# Patient Record
Sex: FEMALE | Race: WHITE | Employment: PART TIME | URBAN - METROPOLITAN AREA
[De-identification: names, ages, dates, MRNs, and addresses within clinical notes are randomized per-mention and may not be internally consistent; named-entity substitution may affect disease eponyms.]

---

## 2019-12-28 ENCOUNTER — APPOINTMENT (OUTPATIENT)
Dept: GENERAL RADIOLOGY | Age: 64
DRG: 197 | End: 2019-12-28
Payer: MEDICARE

## 2019-12-28 ENCOUNTER — HOSPITAL ENCOUNTER (INPATIENT)
Age: 64
LOS: 3 days | Discharge: HOME | DRG: 197 | End: 2020-01-01
Attending: EMERGENCY MEDICINE | Admitting: INTERNAL MEDICINE
Payer: MEDICARE

## 2019-12-28 ENCOUNTER — APPOINTMENT (OUTPATIENT)
Dept: CT IMAGING | Age: 64
DRG: 197 | End: 2019-12-28
Payer: MEDICARE

## 2019-12-28 PROBLEM — J44.9 COPD (CHRONIC OBSTRUCTIVE PULMONARY DISEASE) (HCC): Status: ACTIVE | Noted: 2019-12-28

## 2019-12-28 PROBLEM — J47.9 BRONCHIECTASIS (HCC): Status: ACTIVE | Noted: 2019-12-28

## 2019-12-28 PROBLEM — J84.89 ORGANIZING PNEUMONIA (HCC): Status: ACTIVE | Noted: 2019-12-28

## 2019-12-28 PROBLEM — A31.0 MYCOBACTERIUM AVIUM COMPLEX (HCC): Status: ACTIVE | Noted: 2019-12-28

## 2019-12-28 LAB
ANION GAP SERPL CALCULATED.3IONS-SCNC: 11 MMOL/L (ref 7–16)
APTT: 34.5 SEC (ref 24.5–35.1)
BASOPHILS ABSOLUTE: 0.06 E9/L (ref 0–0.2)
BASOPHILS RELATIVE PERCENT: 0.7 % (ref 0–2)
BUN BLDV-MCNC: 20 MG/DL (ref 8–23)
CALCIUM SERPL-MCNC: 9 MG/DL (ref 8.6–10.2)
CHLORIDE BLD-SCNC: 100 MMOL/L (ref 98–107)
CO2: 27 MMOL/L (ref 22–29)
CREAT SERPL-MCNC: 0.9 MG/DL (ref 0.5–1)
EOSINOPHILS ABSOLUTE: 0.27 E9/L (ref 0.05–0.5)
EOSINOPHILS RELATIVE PERCENT: 3.3 % (ref 0–6)
GFR AFRICAN AMERICAN: >60
GFR NON-AFRICAN AMERICAN: >60 ML/MIN/1.73
GLUCOSE BLD-MCNC: 122 MG/DL (ref 74–99)
HCT VFR BLD CALC: 38.3 % (ref 34–48)
HEMOGLOBIN: 11.9 G/DL (ref 11.5–15.5)
IMMATURE GRANULOCYTES #: 0.02 E9/L
IMMATURE GRANULOCYTES %: 0.2 % (ref 0–5)
INFLUENZA A BY PCR: NOT DETECTED
INFLUENZA B BY PCR: NOT DETECTED
INR BLD: 1.1
LACTIC ACID: 1.1 MMOL/L (ref 0.5–2.2)
LYMPHOCYTES ABSOLUTE: 0.99 E9/L (ref 1.5–4)
LYMPHOCYTES RELATIVE PERCENT: 12.2 % (ref 20–42)
MCH RBC QN AUTO: 29.6 PG (ref 26–35)
MCHC RBC AUTO-ENTMCNC: 31.1 % (ref 32–34.5)
MCV RBC AUTO: 95.3 FL (ref 80–99.9)
MONOCYTES ABSOLUTE: 0.85 E9/L (ref 0.1–0.95)
MONOCYTES RELATIVE PERCENT: 10.5 % (ref 2–12)
NEUTROPHILS ABSOLUTE: 5.94 E9/L (ref 1.8–7.3)
NEUTROPHILS RELATIVE PERCENT: 73.1 % (ref 43–80)
PDW BLD-RTO: 12.8 FL (ref 11.5–15)
PLATELET # BLD: 281 E9/L (ref 130–450)
PMV BLD AUTO: 9.2 FL (ref 7–12)
POTASSIUM REFLEX MAGNESIUM: 3.8 MMOL/L (ref 3.5–5)
PRO-BNP: 465 PG/ML (ref 0–125)
PROTHROMBIN TIME: 12.6 SEC (ref 9.3–12.4)
RBC # BLD: 4.02 E12/L (ref 3.5–5.5)
SEDIMENTATION RATE, ERYTHROCYTE: 66 MM/HR (ref 0–20)
SODIUM BLD-SCNC: 138 MMOL/L (ref 132–146)
TROPONIN: <0.01 NG/ML (ref 0–0.03)
WBC # BLD: 8.1 E9/L (ref 4.5–11.5)

## 2019-12-28 PROCEDURE — 87502 INFLUENZA DNA AMP PROBE: CPT

## 2019-12-28 PROCEDURE — 85025 COMPLETE CBC W/AUTO DIFF WBC: CPT

## 2019-12-28 PROCEDURE — 85730 THROMBOPLASTIN TIME PARTIAL: CPT

## 2019-12-28 PROCEDURE — 83605 ASSAY OF LACTIC ACID: CPT

## 2019-12-28 PROCEDURE — 6360000004 HC RX CONTRAST MEDICATION: Performed by: RADIOLOGY

## 2019-12-28 PROCEDURE — 83880 ASSAY OF NATRIURETIC PEPTIDE: CPT

## 2019-12-28 PROCEDURE — 84484 ASSAY OF TROPONIN QUANT: CPT

## 2019-12-28 PROCEDURE — 71046 X-RAY EXAM CHEST 2 VIEWS: CPT

## 2019-12-28 PROCEDURE — 87040 BLOOD CULTURE FOR BACTERIA: CPT

## 2019-12-28 PROCEDURE — 85610 PROTHROMBIN TIME: CPT

## 2019-12-28 PROCEDURE — 85651 RBC SED RATE NONAUTOMATED: CPT

## 2019-12-28 PROCEDURE — 86140 C-REACTIVE PROTEIN: CPT

## 2019-12-28 PROCEDURE — 6370000000 HC RX 637 (ALT 250 FOR IP): Performed by: EMERGENCY MEDICINE

## 2019-12-28 PROCEDURE — G0378 HOSPITAL OBSERVATION PER HR: HCPCS

## 2019-12-28 PROCEDURE — 84145 PROCALCITONIN (PCT): CPT

## 2019-12-28 PROCEDURE — 36415 COLL VENOUS BLD VENIPUNCTURE: CPT

## 2019-12-28 PROCEDURE — 6360000002 HC RX W HCPCS: Performed by: EMERGENCY MEDICINE

## 2019-12-28 PROCEDURE — 99285 EMERGENCY DEPT VISIT HI MDM: CPT

## 2019-12-28 PROCEDURE — 87186 SC STD MICRODIL/AGAR DIL: CPT

## 2019-12-28 PROCEDURE — 99222 1ST HOSP IP/OBS MODERATE 55: CPT | Performed by: INTERNAL MEDICINE

## 2019-12-28 PROCEDURE — 2580000003 HC RX 258: Performed by: EMERGENCY MEDICINE

## 2019-12-28 PROCEDURE — 80048 BASIC METABOLIC PNL TOTAL CA: CPT

## 2019-12-28 PROCEDURE — 71260 CT THORAX DX C+: CPT

## 2019-12-28 PROCEDURE — 93005 ELECTROCARDIOGRAM TRACING: CPT | Performed by: EMERGENCY MEDICINE

## 2019-12-28 PROCEDURE — 96365 THER/PROPH/DIAG IV INF INIT: CPT

## 2019-12-28 RX ORDER — ALBUTEROL SULFATE 90 UG/1
2 AEROSOL, METERED RESPIRATORY (INHALATION)
COMMUNITY
Start: 2019-05-22 | End: 2020-06-10 | Stop reason: SDUPTHER

## 2019-12-28 RX ORDER — FORMOTEROL FUMARATE 20 UG/2ML
20 SOLUTION RESPIRATORY (INHALATION) EVERY 12 HOURS
Status: DISCONTINUED | OUTPATIENT
Start: 2019-12-28 | End: 2020-01-01 | Stop reason: HOSPADM

## 2019-12-28 RX ORDER — IPRATROPIUM BROMIDE AND ALBUTEROL SULFATE 2.5; .5 MG/3ML; MG/3ML
1 SOLUTION RESPIRATORY (INHALATION)
Status: DISCONTINUED | OUTPATIENT
Start: 2019-12-29 | End: 2020-01-01 | Stop reason: HOSPADM

## 2019-12-28 RX ORDER — SODIUM CHLORIDE 0.9 % (FLUSH) 0.9 %
10 SYRINGE (ML) INJECTION PRN
Status: DISCONTINUED | OUTPATIENT
Start: 2019-12-28 | End: 2020-01-01 | Stop reason: HOSPADM

## 2019-12-28 RX ORDER — SODIUM CHLORIDE 0.9 % (FLUSH) 0.9 %
10 SYRINGE (ML) INJECTION EVERY 12 HOURS SCHEDULED
Status: DISCONTINUED | OUTPATIENT
Start: 2019-12-28 | End: 2020-01-01 | Stop reason: HOSPADM

## 2019-12-28 RX ORDER — VILAZODONE HYDROCHLORIDE 40 MG/1
40 TABLET ORAL DAILY
Status: ON HOLD | COMMUNITY
End: 2020-05-20 | Stop reason: ALTCHOICE

## 2019-12-28 RX ORDER — ONDANSETRON 2 MG/ML
4 INJECTION INTRAMUSCULAR; INTRAVENOUS EVERY 6 HOURS PRN
Status: DISCONTINUED | OUTPATIENT
Start: 2019-12-28 | End: 2020-01-01 | Stop reason: HOSPADM

## 2019-12-28 RX ORDER — METHYLPREDNISOLONE SODIUM SUCCINATE 40 MG/ML
40 INJECTION, POWDER, LYOPHILIZED, FOR SOLUTION INTRAMUSCULAR; INTRAVENOUS DAILY
Status: DISCONTINUED | OUTPATIENT
Start: 2019-12-28 | End: 2019-12-29

## 2019-12-28 RX ORDER — BUDESONIDE 0.5 MG/2ML
500 INHALANT ORAL 2 TIMES DAILY
Status: DISCONTINUED | OUTPATIENT
Start: 2019-12-28 | End: 2020-01-01 | Stop reason: HOSPADM

## 2019-12-28 RX ORDER — ACETAMINOPHEN 325 MG/1
650 TABLET ORAL EVERY 4 HOURS PRN
Status: DISCONTINUED | OUTPATIENT
Start: 2019-12-28 | End: 2020-01-01 | Stop reason: HOSPADM

## 2019-12-28 RX ORDER — IPRATROPIUM BROMIDE AND ALBUTEROL SULFATE 2.5; .5 MG/3ML; MG/3ML
3 SOLUTION RESPIRATORY (INHALATION) ONCE
Status: COMPLETED | OUTPATIENT
Start: 2019-12-28 | End: 2019-12-28

## 2019-12-28 RX ORDER — PIPERACILLIN SODIUM, TAZOBACTAM SODIUM 3; .375 G/15ML; G/15ML
3.38 INJECTION, POWDER, LYOPHILIZED, FOR SOLUTION INTRAVENOUS EVERY 6 HOURS
Status: DISCONTINUED | OUTPATIENT
Start: 2019-12-28 | End: 2019-12-28 | Stop reason: CLARIF

## 2019-12-28 RX ADMIN — CEFTRIAXONE 2 G: 2 INJECTION, POWDER, FOR SOLUTION INTRAMUSCULAR; INTRAVENOUS at 18:49

## 2019-12-28 RX ADMIN — IOPAMIDOL 80 ML: 755 INJECTION, SOLUTION INTRAVENOUS at 20:10

## 2019-12-28 RX ADMIN — IPRATROPIUM BROMIDE AND ALBUTEROL SULFATE 3 AMPULE: .5; 3 SOLUTION RESPIRATORY (INHALATION) at 17:29

## 2019-12-28 ASSESSMENT — ENCOUNTER SYMPTOMS
ABDOMINAL PAIN: 0
COUGH: 1
WHEEZING: 1
VOMITING: 0
BACK PAIN: 0
NAUSEA: 0
BLOOD IN STOOL: 0
CHEST TIGHTNESS: 0
DIARRHEA: 0
COLOR CHANGE: 0
SHORTNESS OF BREATH: 1

## 2019-12-28 ASSESSMENT — PAIN SCALES - GENERAL: PAINLEVEL_OUTOF10: 0

## 2019-12-28 NOTE — ED PROVIDER NOTES
Patient is a 82-year-old female with a history of MAC, bronchiectasis, COPD, frequent pneumonia, multiple lung nodules presenting for reported shortness of breath. She is been having URI symptoms since Georgi noting a productive cough with a clear sputum without hemoptysis, congestion, shortness of breath. Did receive her flu and pneumonia vaccines this year. Recently relocated to the area as she was previously a resident at [facility]. Follows with pulmonology there but has not established local pulmonology at this time. Called her physician, she was started on doxycycline several days previously, continues to have worsening symptoms prompting her evaluation here today. Symptoms reportedly moderate in severity, worse with exertion, improved with rest.  Denies chest pain, nausea, vomiting, diaphoresis, abdominal pain, syncope, back pain, dysuria, constipation, diarrhea. Review of Systems   Constitutional: Positive for fatigue. Negative for chills and fever. Respiratory: Positive for cough, shortness of breath and wheezing. Negative for chest tightness. Cardiovascular: Negative for chest pain, palpitations and leg swelling. Gastrointestinal: Negative for abdominal pain, blood in stool, diarrhea, nausea and vomiting. Genitourinary: Negative for dysuria, flank pain, frequency, menstrual problem, vaginal bleeding and vaginal discharge. Musculoskeletal: Negative for back pain and neck pain. Skin: Negative for color change, rash and wound. Neurological: Negative for dizziness, syncope, weakness and light-headedness. Physical Exam  Vitals signs and nursing note reviewed. Constitutional:       General: She is not in acute distress. Appearance: Normal appearance. She is well-developed. She is not diaphoretic. HENT:      Head: Normocephalic and atraumatic. Eyes:      General: No scleral icterus. Pupils: Pupils are equal, round, and reactive to light.    Neck: Musculoskeletal: Normal range of motion and neck supple. Vascular: No JVD. Cardiovascular:      Rate and Rhythm: Normal rate and regular rhythm. Heart sounds: Normal heart sounds, S1 normal and S2 normal. No murmur. Pulmonary:      Effort: No accessory muscle usage or respiratory distress. Breath sounds: Wheezing and rales (Right lung diffusely) present. No rhonchi. Comments: Minimally increased work of breathing. Abdominal:      General: Bowel sounds are normal. There is no distension. Palpations: Abdomen is soft. Tenderness: There is no tenderness. Musculoskeletal: Normal range of motion. Lymphadenopathy:      Cervical: No cervical adenopathy. Skin:     General: Skin is warm and dry. Coloration: Skin is not pale. Findings: No rash. Neurological:      Mental Status: She is alert and oriented to person, place, and time. Procedures     ED Course as of Dec 28 2124   Sat Dec 28, 2019   2042 Spoke with Dr. Nannette Matamoros, accepted for admission. [RU]      ED Course User Index  [RU] Aaliyah Laureano, DO      --------------------------------------------- PAST HISTORY ---------------------------------------------  Past Medical History:  has no past medical history on file. Past Surgical History:  has no past surgical history on file. Social History:  reports that she has never smoked. She has never used smokeless tobacco. She reports that she does not drink alcohol or use drugs. Family History: family history is not on file. The patients home medications have been reviewed. Allergies: Patient has no known allergies.     -------------------------------------------------- RESULTS -------------------------------------------------    Lab  Results for orders placed or performed during the hospital encounter of 12/28/19   Rapid influenza A/B antigens   Result Value Ref Range    Influenza A by PCR Not Detected Not Detected    Influenza B by PCR Not Detected Not Detected   CBC Auto Differential   Result Value Ref Range    WBC 8.1 4.5 - 11.5 E9/L    RBC 4.02 3.50 - 5.50 E12/L    Hemoglobin 11.9 11.5 - 15.5 g/dL    Hematocrit 38.3 34.0 - 48.0 %    MCV 95.3 80.0 - 99.9 fL    MCH 29.6 26.0 - 35.0 pg    MCHC 31.1 (L) 32.0 - 34.5 %    RDW 12.8 11.5 - 15.0 fL    Platelets 708 469 - 669 E9/L    MPV 9.2 7.0 - 12.0 fL    Neutrophils % 73.1 43.0 - 80.0 %    Immature Granulocytes % 0.2 0.0 - 5.0 %    Lymphocytes % 12.2 (L) 20.0 - 42.0 %    Monocytes % 10.5 2.0 - 12.0 %    Eosinophils % 3.3 0.0 - 6.0 %    Basophils % 0.7 0.0 - 2.0 %    Neutrophils Absolute 5.94 1.80 - 7.30 E9/L    Immature Granulocytes # 0.02 E9/L    Lymphocytes Absolute 0.99 (L) 1.50 - 4.00 E9/L    Monocytes Absolute 0.85 0.10 - 0.95 E9/L    Eosinophils Absolute 0.27 0.05 - 0.50 E9/L    Basophils Absolute 0.06 0.00 - 0.20 F2/O   Basic Metabolic Panel w/ Reflex to MG   Result Value Ref Range    Sodium 138 132 - 146 mmol/L    Potassium reflex Magnesium 3.8 3.5 - 5.0 mmol/L    Chloride 100 98 - 107 mmol/L    CO2 27 22 - 29 mmol/L    Anion Gap 11 7 - 16 mmol/L    Glucose 122 (H) 74 - 99 mg/dL    BUN 20 8 - 23 mg/dL    CREATININE 0.9 0.5 - 1.0 mg/dL    GFR Non-African American >60 >=60 mL/min/1.73    GFR African American >60     Calcium 9.0 8.6 - 10.2 mg/dL   Troponin   Result Value Ref Range    Troponin <0.01 0.00 - 0.03 ng/mL   Brain Natriuretic Peptide   Result Value Ref Range    Pro- (H) 0 - 125 pg/mL   Protime-INR   Result Value Ref Range    Protime 12.6 (H) 9.3 - 12.4 sec    INR 1.1    APTT   Result Value Ref Range    aPTT 34.5 24.5 - 35.1 sec   Lactic Acid, Plasma   Result Value Ref Range    Lactic Acid 1.1 0.5 - 2.2 mmol/L   EKG 12 Lead   Result Value Ref Range    Ventricular Rate 91 BPM    Atrial Rate 91 BPM    P-R Interval 130 ms    QRS Duration 88 ms    Q-T Interval 384 ms    QTc Calculation (Bazett) 472 ms    P Axis 61 degrees    R Axis 32 degrees    T Axis 8 degrees       Radiology  CT CHEST with potential for parapneumonic effusion, CT scan of the chest performed, notable for possible pulmonary abscesses. Afebrile here in the department without leukocytosis. Given initial dose of Rocephin followed by vancomycin. Influenza negative. Due to significant findings on CT scan of the chest in conjunction with abnormal EKG without prior for comparison, decision made to admit for further work-up and evaluation management as she will likely require echocardiogram.      --------------------------------- ADDITIONAL PROVIDER NOTES ---------------------------------  Counseling:  I have spoken with the patient and discussed todays results, in addition to providing specific details for the plan of care and counseling regarding the diagnosis and prognosis. Their questions are answered at this time and they are agreeable with the plan of admission. This patient has remained hemodynamically stable during their ED course. Diagnosis:  1. Pneumonia due to organism    2. Septic embolism (HCC)        Disposition:  Patient's disposition: Admit to telemetry  Patient's condition is stable.        Jesika Espinosa DO  Resident  12/28/19 3430

## 2019-12-29 LAB
ADENOVIRUS BY PCR: NOT DETECTED
ANION GAP SERPL CALCULATED.3IONS-SCNC: 14 MMOL/L (ref 7–16)
BASOPHILS ABSOLUTE: 0.04 E9/L (ref 0–0.2)
BASOPHILS RELATIVE PERCENT: 0.6 % (ref 0–2)
BORDETELLA PARAPERTUSSIS BY PCR: NOT DETECTED
BORDETELLA PERTUSSIS BY PCR: NOT DETECTED
BUN BLDV-MCNC: 14 MG/DL (ref 8–23)
BURR CELLS: ABNORMAL
C-REACTIVE PROTEIN: 13 MG/DL (ref 0–0.4)
CALCIUM SERPL-MCNC: 8.5 MG/DL (ref 8.6–10.2)
CHLAMYDOPHILIA PNEUMONIAE BY PCR: NOT DETECTED
CHLORIDE BLD-SCNC: 100 MMOL/L (ref 98–107)
CO2: 25 MMOL/L (ref 22–29)
CORONAVIRUS 229E BY PCR: NOT DETECTED
CORONAVIRUS HKU1 BY PCR: NOT DETECTED
CORONAVIRUS NL63 BY PCR: NOT DETECTED
CORONAVIRUS OC43 BY PCR: NOT DETECTED
CREAT SERPL-MCNC: 0.9 MG/DL (ref 0.5–1)
EKG ATRIAL RATE: 91 BPM
EKG P AXIS: 61 DEGREES
EKG P-R INTERVAL: 130 MS
EKG Q-T INTERVAL: 384 MS
EKG QRS DURATION: 88 MS
EKG QTC CALCULATION (BAZETT): 472 MS
EKG R AXIS: 32 DEGREES
EKG T AXIS: 8 DEGREES
EKG VENTRICULAR RATE: 91 BPM
EOSINOPHILS ABSOLUTE: 0.03 E9/L (ref 0.05–0.5)
EOSINOPHILS RELATIVE PERCENT: 0.4 % (ref 0–6)
GFR AFRICAN AMERICAN: >60
GFR NON-AFRICAN AMERICAN: >60 ML/MIN/1.73
GLUCOSE BLD-MCNC: 177 MG/DL (ref 74–99)
HCT VFR BLD CALC: 35.1 % (ref 34–48)
HEMOGLOBIN: 10.9 G/DL (ref 11.5–15.5)
HUMAN METAPNEUMOVIRUS BY PCR: NOT DETECTED
HUMAN RHINOVIRUS/ENTEROVIRUS BY PCR: NOT DETECTED
IMMATURE GRANULOCYTES #: 0.04 E9/L
IMMATURE GRANULOCYTES %: 0.6 % (ref 0–5)
INFLUENZA A BY PCR: NOT DETECTED
INFLUENZA B BY PCR: NOT DETECTED
LV EF: 60 %
LVEF MODALITY: NORMAL
LYMPHOCYTES ABSOLUTE: 0.49 E9/L (ref 1.5–4)
LYMPHOCYTES RELATIVE PERCENT: 6.7 % (ref 20–42)
MCH RBC QN AUTO: 29.5 PG (ref 26–35)
MCHC RBC AUTO-ENTMCNC: 31.1 % (ref 32–34.5)
MCV RBC AUTO: 94.9 FL (ref 80–99.9)
MONOCYTES ABSOLUTE: 0.14 E9/L (ref 0.1–0.95)
MONOCYTES RELATIVE PERCENT: 1.9 % (ref 2–12)
MYCOPLASMA PNEUMONIAE BY PCR: NOT DETECTED
NEUTROPHILS ABSOLUTE: 6.53 E9/L (ref 1.8–7.3)
NEUTROPHILS RELATIVE PERCENT: 89.8 % (ref 43–80)
PARAINFLUENZA VIRUS 1 BY PCR: NOT DETECTED
PARAINFLUENZA VIRUS 2 BY PCR: NOT DETECTED
PARAINFLUENZA VIRUS 3 BY PCR: NOT DETECTED
PARAINFLUENZA VIRUS 4 BY PCR: NOT DETECTED
PDW BLD-RTO: 12.9 FL (ref 11.5–15)
PLATELET # BLD: 273 E9/L (ref 130–450)
PMV BLD AUTO: 9.4 FL (ref 7–12)
POIKILOCYTES: ABNORMAL
POTASSIUM REFLEX MAGNESIUM: 4.2 MMOL/L (ref 3.5–5)
PROCALCITONIN: 0.06 NG/ML (ref 0–0.08)
RBC # BLD: 3.7 E12/L (ref 3.5–5.5)
RESPIRATORY SYNCYTIAL VIRUS BY PCR: DETECTED
SODIUM BLD-SCNC: 139 MMOL/L (ref 132–146)
WBC # BLD: 7.3 E9/L (ref 4.5–11.5)

## 2019-12-29 PROCEDURE — 96366 THER/PROPH/DIAG IV INF ADDON: CPT

## 2019-12-29 PROCEDURE — 6360000002 HC RX W HCPCS: Performed by: INTERNAL MEDICINE

## 2019-12-29 PROCEDURE — 94640 AIRWAY INHALATION TREATMENT: CPT

## 2019-12-29 PROCEDURE — 87450 HC DIRECT STREP B ANTIGEN: CPT

## 2019-12-29 PROCEDURE — 86703 HIV-1/HIV-2 1 RESULT ANTBDY: CPT

## 2019-12-29 PROCEDURE — 85025 COMPLETE CBC W/AUTO DIFF WBC: CPT

## 2019-12-29 PROCEDURE — G0378 HOSPITAL OBSERVATION PER HR: HCPCS

## 2019-12-29 PROCEDURE — 87081 CULTURE SCREEN ONLY: CPT

## 2019-12-29 PROCEDURE — 36415 COLL VENOUS BLD VENIPUNCTURE: CPT

## 2019-12-29 PROCEDURE — 96375 TX/PRO/DX INJ NEW DRUG ADDON: CPT

## 2019-12-29 PROCEDURE — 93010 ELECTROCARDIOGRAM REPORT: CPT | Performed by: INTERNAL MEDICINE

## 2019-12-29 PROCEDURE — 6360000002 HC RX W HCPCS: Performed by: STUDENT IN AN ORGANIZED HEALTH CARE EDUCATION/TRAINING PROGRAM

## 2019-12-29 PROCEDURE — 99232 SBSQ HOSP IP/OBS MODERATE 35: CPT | Performed by: INTERNAL MEDICINE

## 2019-12-29 PROCEDURE — 93306 TTE W/DOPPLER COMPLETE: CPT

## 2019-12-29 PROCEDURE — 96367 TX/PROPH/DG ADDL SEQ IV INF: CPT

## 2019-12-29 PROCEDURE — 6370000000 HC RX 637 (ALT 250 FOR IP): Performed by: STUDENT IN AN ORGANIZED HEALTH CARE EDUCATION/TRAINING PROGRAM

## 2019-12-29 PROCEDURE — 80048 BASIC METABOLIC PNL TOTAL CA: CPT

## 2019-12-29 PROCEDURE — 94664 DEMO&/EVAL PT USE INHALER: CPT

## 2019-12-29 PROCEDURE — 1200000000 HC SEMI PRIVATE

## 2019-12-29 PROCEDURE — 2580000003 HC RX 258: Performed by: STUDENT IN AN ORGANIZED HEALTH CARE EDUCATION/TRAINING PROGRAM

## 2019-12-29 PROCEDURE — 6370000000 HC RX 637 (ALT 250 FOR IP): Performed by: INTERNAL MEDICINE

## 2019-12-29 PROCEDURE — APPSS30 APP SPLIT SHARED TIME 16-30 MINUTES: Performed by: PHYSICIAN ASSISTANT

## 2019-12-29 PROCEDURE — 0100U HC RESPIRPTHGN MULT REV TRANS & AMP PRB TECH 21 TRGT: CPT

## 2019-12-29 RX ORDER — GUAIFENESIN/DEXTROMETHORPHAN 100-10MG/5
5 SYRUP ORAL EVERY 4 HOURS PRN
Status: DISCONTINUED | OUTPATIENT
Start: 2019-12-29 | End: 2020-01-01 | Stop reason: HOSPADM

## 2019-12-29 RX ORDER — VILAZODONE HYDROCHLORIDE 40 MG/1
40 TABLET ORAL DAILY
Status: DISCONTINUED | OUTPATIENT
Start: 2019-12-29 | End: 2020-01-01 | Stop reason: HOSPADM

## 2019-12-29 RX ADMIN — VANCOMYCIN HYDROCHLORIDE 1000 MG: 1 INJECTION, POWDER, LYOPHILIZED, FOR SOLUTION INTRAVENOUS at 00:41

## 2019-12-29 RX ADMIN — ENOXAPARIN SODIUM 40 MG: 40 INJECTION SUBCUTANEOUS at 08:41

## 2019-12-29 RX ADMIN — IPRATROPIUM BROMIDE AND ALBUTEROL SULFATE 1 AMPULE: .5; 3 SOLUTION RESPIRATORY (INHALATION) at 07:58

## 2019-12-29 RX ADMIN — ACETAMINOPHEN 650 MG: 325 TABLET ORAL at 02:12

## 2019-12-29 RX ADMIN — FORMOTEROL FUMARATE DIHYDRATE 20 MCG: 20 SOLUTION RESPIRATORY (INHALATION) at 20:18

## 2019-12-29 RX ADMIN — BUDESONIDE 500 MCG: 0.5 SUSPENSION RESPIRATORY (INHALATION) at 20:18

## 2019-12-29 RX ADMIN — SODIUM CHLORIDE, PRESERVATIVE FREE 10 ML: 5 INJECTION INTRAVENOUS at 00:42

## 2019-12-29 RX ADMIN — PIPERACILLIN AND TAZOBACTAM 3.38 G: 3; .375 INJECTION, POWDER, FOR SOLUTION INTRAVENOUS at 02:14

## 2019-12-29 RX ADMIN — PIPERACILLIN AND TAZOBACTAM 3.38 G: 3; .375 INJECTION, POWDER, FOR SOLUTION INTRAVENOUS at 10:11

## 2019-12-29 RX ADMIN — SODIUM CHLORIDE, PRESERVATIVE FREE 10 ML: 5 INJECTION INTRAVENOUS at 21:36

## 2019-12-29 RX ADMIN — IPRATROPIUM BROMIDE AND ALBUTEROL SULFATE 1 AMPULE: .5; 3 SOLUTION RESPIRATORY (INHALATION) at 15:29

## 2019-12-29 RX ADMIN — BUDESONIDE 500 MCG: 0.5 SUSPENSION RESPIRATORY (INHALATION) at 07:58

## 2019-12-29 RX ADMIN — SODIUM CHLORIDE, PRESERVATIVE FREE 10 ML: 5 INJECTION INTRAVENOUS at 08:42

## 2019-12-29 RX ADMIN — IPRATROPIUM BROMIDE AND ALBUTEROL SULFATE 1 AMPULE: .5; 3 SOLUTION RESPIRATORY (INHALATION) at 11:18

## 2019-12-29 RX ADMIN — IPRATROPIUM BROMIDE AND ALBUTEROL SULFATE 1 AMPULE: .5; 3 SOLUTION RESPIRATORY (INHALATION) at 20:18

## 2019-12-29 RX ADMIN — METHYLPREDNISOLONE SODIUM SUCCINATE 40 MG: 40 INJECTION, POWDER, LYOPHILIZED, FOR SOLUTION INTRAMUSCULAR; INTRAVENOUS at 00:42

## 2019-12-29 RX ADMIN — ACETAMINOPHEN 650 MG: 325 TABLET ORAL at 13:56

## 2019-12-29 RX ADMIN — VILAZODONE HYDROCHLORIDE 40 MG: 40 TABLET ORAL at 13:56

## 2019-12-29 RX ADMIN — GUAIFENESIN AND DEXTROMETHORPHAN 5 ML: 100; 10 SYRUP ORAL at 17:37

## 2019-12-29 RX ADMIN — FORMOTEROL FUMARATE DIHYDRATE 20 MCG: 20 SOLUTION RESPIRATORY (INHALATION) at 07:58

## 2019-12-29 RX ADMIN — ACETAMINOPHEN 650 MG: 325 TABLET ORAL at 08:41

## 2019-12-29 ASSESSMENT — PAIN DESCRIPTION - ONSET
ONSET: ON-GOING

## 2019-12-29 ASSESSMENT — PAIN DESCRIPTION - DESCRIPTORS
DESCRIPTORS: POUNDING
DESCRIPTORS: POUNDING
DESCRIPTORS: HEADACHE

## 2019-12-29 ASSESSMENT — PAIN DESCRIPTION - PROGRESSION
CLINICAL_PROGRESSION: NOT CHANGED
CLINICAL_PROGRESSION: GRADUALLY IMPROVING
CLINICAL_PROGRESSION: NOT CHANGED
CLINICAL_PROGRESSION: NOT CHANGED

## 2019-12-29 ASSESSMENT — PAIN DESCRIPTION - FREQUENCY
FREQUENCY: CONTINUOUS

## 2019-12-29 ASSESSMENT — PAIN DESCRIPTION - PAIN TYPE
TYPE: ACUTE PAIN

## 2019-12-29 ASSESSMENT — PAIN DESCRIPTION - LOCATION
LOCATION: HEAD

## 2019-12-29 ASSESSMENT — PAIN SCALES - GENERAL
PAINLEVEL_OUTOF10: 8
PAINLEVEL_OUTOF10: 4
PAINLEVEL_OUTOF10: 2

## 2019-12-29 ASSESSMENT — PAIN - FUNCTIONAL ASSESSMENT
PAIN_FUNCTIONAL_ASSESSMENT: ACTIVITIES ARE NOT PREVENTED

## 2019-12-29 NOTE — PROGRESS NOTES
Labs     12/28/19  1715 12/29/19  0400    139   K 3.8 4.2    100   CO2 27 25   BUN 20 14   CREATININE 0.9 0.9   GLUCOSE 122* 177*   CALCIUM 9.0 8.5*       Recent Labs     12/28/19  1715 12/29/19  0400   WBC 8.1 7.3   RBC 4.02 3.70   HGB 11.9 10.9*   HCT 38.3 35.1   MCV 95.3 94.9   MCH 29.6 29.5   MCHC 31.1* 31.1*   RDW 12.8 12.9    273   MPV 9.2 9.4        Radiology:   CT CHEST W CONTRAST   Final Result   Organizing pneumonia in the right middle lobe and lingular regions   with crowded ectatic airway suggesting bronchiectasis in the right   middle lobe. In the right lower lobe, peripheral nodular appearing densities with   some central cavitation suggest pulmonary abscesses, such as with   septic emboli. There is no evidence of pleural effusion or cardiac decompensation. ALERT:  THIS IS AN ABNORMAL REPORT-bilateral organizing pneumonia with   pulmonary abscesses on the right suggesting septic emboli. XR CHEST STANDARD (2 VW)   Final Result   : Patchy infiltrate in the regions of the right midlobe and   in the right lower lobe. Can represent developing acute pneumonia. Please correlate clinically. Assessment:    Principal Problem:    Organizing pneumonia ([address]  Active Problems:    Mycobacterium avium complex ([address]    Bronchiectasis ([address]    COPD (chronic obstructive pulmonary disease) ([address]    Pneumonia due to organism  Resolved Problems:    * No resolved hospital problems. *      Plan:  1. Pneumonia with possible abscess with possible septic emboli seen on CT chest: Patient does have a history of MAC. Was started on vancomycin and Zosyn. ID has been consulted regarding antibiotics- plan to stop antibiotics and proceed with bronchoscopy likely. Pulmonary has also been consulted, appreciate recommendations. Patient is currently on room air, continues to have intermittent shortness of breath and nonproductive cough. Procalcitonin negative.   2. History of MAC:

## 2019-12-29 NOTE — PROGRESS NOTES
(chronic obstructive pulmonary disease) (HCC)     Mycobacterium avium complex ([facility]      Past Surgical History:    History reviewed. No pertinent surgical history. Current Medications:   Scheduled Meds:   vilazodone HCl  40 mg Oral Daily    ipratropium-albuterol  1 ampule Inhalation Q4H WA    sodium chloride flush  10 mL Intravenous 2 times per day    enoxaparin  40 mg Subcutaneous Daily    budesonide  500 mcg Nebulization BID    formoterol  20 mcg Nebulization Q12H    piperacillin-tazobactam  3.375 g Intravenous Q8H     Continuous Infusions:  PRN Meds:sodium chloride flush, sodium chloride flush, magnesium hydroxide, ondansetron, acetaminophen    Allergies:  Patient has no known allergies.     Social History:   Social History     Socioeconomic History    Marital status:      Spouse name: None    Number of children: None    Years of education: None    Highest education level: None   Occupational History    None   Social Needs    Financial resource strain: None    Food insecurity:     Worry: None     Inability: None    Transportation needs:     Medical: None     Non-medical: None   Tobacco Use    Smoking status: Former Smoker     Packs/day: 1.00     Years: 20.00     Pack years: 20.00     Types: Cigarettes     Start date: 1976     Last attempt to quit: 1997     Years since quittin.0    Smokeless tobacco: Never Used   Substance and Sexual Activity    Alcohol use: No    Drug use: No    Sexual activity: None   Lifestyle    Physical activity:     Days per week: None     Minutes per session: None    Stress: None   Relationships    Social connections:     Talks on phone: None     Gets together: None     Attends Sikhism service: None     Active member of club or organization: None     Attends meetings of clubs or organizations: None     Relationship status: None    Intimate partner violence:     Fear of current or ex partner: None     Emotionally abused: None     Physically abused: None     Forced sexual activity: None   Other Topics Concern    None   Social History Narrative    None      Pets: No  Travel: No    Family History:       Problem Relation Age of Onset    No Known Problems Mother     No Known Problems Father    . Otherwise non-pertinent to the chief complaint. REVIEW OF SYSTEMS:    Constitutional: No fevers but does report some occasional night sweats  Neurologic: Negative   Psychiatric: Negative  Rheumatologic: Negative   Endocrine: Negative  Hematologic: Negative  Immunologic: Negative. Vaccinations up-to-date  ENT: Negative  Respiratory: As in the HPI  Cardiovascular: Negative  GI: Negative  : Negative  Musculoskeletal: Negative  Skin: No rashes. PHYSICAL EXAM:    Vitals:   BP (!) 103/53   Pulse 77   Temp 97.5 °F (36.4 °C) (Oral)   Resp 16   Ht 5' 2\" (1.575 m)   Wt 107 lb 6.4 oz (48.7 kg)   SpO2 93%   BMI 19.64 kg/m²   Constitutional: The patient is awake, alert, and oriented. Visitor present. She has a thin complexion. Skin: Warm and dry. No rashes were noted. HEENT: Eyes show round, and reactive pupils. No jaundice. Moist mucous membranes, no ulcerations, no thrush. Neck: Supple to movements. No lymphadenopathy. Chest: No use of accessory muscles to breathe. Symmetrical expansion. Scattered crackles, especially on the right base posteriorly. Cardiovascular: S1 and S2 are rhythmic and regular. No murmurs appreciated. Abdomen: Positive bowel sounds to auscultation. Benign to palpation. No masses felt. No hepatosplenomegaly. Extremities: No clubbing, no cyanosis, no edema.   Lines: peripheral      CBC+dif:  Recent Labs     12/28/19  1715 12/29/19  0400   WBC 8.1 7.3   HGB 11.9 10.9*   HCT 38.3 35.1   MCV 95.3 94.9    273   NEUTROABS 5.94 6.53     Lab Results   Component Value Date    CRP 13.0 (H) 12/28/2019      No results found for: CRPHS  Lab Results   Component Value Date    SEDRATE 66 (H) 12/28/2019     No results found for: ALT, AST, GGT, ALKPHOS, BILITOT  Lab Results   Component Value Date     12/29/2019    K 4.2 12/29/2019     12/29/2019    CO2 25 12/29/2019    BUN 14 12/29/2019    CREATININE 0.9 12/29/2019    GFRAA >60 12/29/2019    LABGLOM >60 12/29/2019    GLUCOSE 177 12/29/2019    CALCIUM 8.5 12/29/2019       Lab Results   Component Value Date    PROTIME 12.6 12/28/2019    INR 1.1 12/28/2019       No results found for: TSH    No results found for: NITRITE, COLORU, PHUR, LABCAST, WBCUA, RBCUA, MUCUS, TRICHOMONAS, YEAST, BACTERIA, CLARITYU, SPECGRAV, LEUKOCYTESUR, UROBILINOGEN, BILIRUBINUR, BLOODU, GLUCOSEU, AMORPHOUS    No results found for: HCO3, BE, O2SAT, PH, THGB, PCO2, PO2, TCO2  Radiology:  CT of the chest reviewed. Nothing to compare with    Microbiology:  Pending  No results for input(s): BC in the last 72 hours. No results for input(s): ORG in the last 72 hours. No results for input(s): Berdie Sandra in the last 72 hours. No results for input(s): STREPNEUMAGU in the last 72 hours. No results for input(s): LP1UAG in the last 72 hours. No results for input(s): ASO in the last 72 hours. No results for input(s): CULTRESP in the last 72 hours. Recent Labs     12/28/19  1735   PROCAL 0.06       Assessment:  · Organizing pneumonia. No evidence of consolidation to suggest bacterial pneumonia. Cannot rule out the possibility of a viral infection  · History of MAC, treated with Rifampin, Ethambutol and Clarithromycin for 2 years dating back to 2016. She may have had a recurrence  · Bronchiectasis  · Small lung cavitary lesions associated to the above    Plan:    · I think is best to stop antibiotics and go ahead and proceed with the bronchoscopy  · Urine antigens  · Respiratory panel  · Check cultures, baseline ESR, CRP  · Nares screen for MRSA  · Will follow with you    Thank you for having us see this patient in consultation.   The case was discussed with Dr. Johan Morris  2:06 PM  12/29/2019

## 2019-12-29 NOTE — H&P
97.5 °F (36.4 °C) (Axillary)   Resp 18   Ht 5' 2\" (1.575 m)   Wt 104 lb (47.2 kg)   SpO2 95%   BMI 19.02 kg/m²     General Appearance: alert and oriented to person, place and time and in no acute distress  Skin: warm and dry  Head: normocephalic and atraumatic  Eyes: pupils equal, round, and reactive to light, extraocular eye movements intact, conjunctivae normal  Neck: neck supple and non tender without mass   Pulmonary/Chest: clear to auscultation bilaterally- no wheezes, rales or rhonchi, normal air movement, no respiratory distress  Cardiovascular: normal rate, normal S1 and S2 and no carotid bruits  Abdomen: soft, non-tender, non-distended, normal bowel sounds, no masses or organomegaly  Extremities: no cyanosis, no clubbing and no edema  Neurologic: no cranial nerve deficit and speech normal    LABS:  Recent Labs     12/28/19  1715      K 3.8      CO2 27   BUN 20   CREATININE 0.9   GLUCOSE 122*   CALCIUM 9.0       Recent Labs     12/28/19  1715   WBC 8.1   RBC 4.02   HGB 11.9   HCT 38.3   MCV 95.3   MCH 29.6   MCHC 31.1*   RDW 12.8      MPV 9.2       No results for input(s): POCGLU in the last 72 hours. Radiology:   CT CHEST W CONTRAST   Final Result   Organizing pneumonia in the right middle lobe and lingular regions   with crowded ectatic airway suggesting bronchiectasis in the right   middle lobe. In the right lower lobe, peripheral nodular appearing densities with   some central cavitation suggest pulmonary abscesses, such as with   septic emboli. There is no evidence of pleural effusion or cardiac decompensation. ALERT:  THIS IS AN ABNORMAL REPORT-bilateral organizing pneumonia with   pulmonary abscesses on the right suggesting septic emboli. XR CHEST STANDARD (2 VW)   Final Result   : Patchy infiltrate in the regions of the right midlobe and   in the right lower lobe. Can represent developing acute pneumonia. Please correlate clinically.         EKG: Normal sinus rhythm    ASSESSMENT:      Principal Problem:    Organizing pneumonia ([facility]  Active Problems:    Mycobacterium avium complex ([facility]    Bronchiectasis ([facility]    COPD (chronic obstructive pulmonary disease) ([facility]  Resolved Problems:    * No resolved hospital problems. *    PLAN:  1.  Organizing pneumonia with possible abscess with possible septic emboli seen on CT chest  -Vanco and Zosyn, ID consulted input appreciated, history of MDR Pseudomonas with review of culture from 2018 susceptible to Zosyn, sputum culture pending  -Pulmonology consulted input appreciated  -Strep pneumo, Legionella antigen pending, procalcitonin pending, CRP and sed rate pending  -DuoNebs and Pulmicort, incentive spirometry    Code Status: Full Code  DVT prophylaxis: heparin 5000 TID     Electronically signed by Joseluis Elizalde MD on 12/28/2019 at 8:42 PM

## 2019-12-29 NOTE — PROGRESS NOTES
Spoke with [facility] pharmacy in [facility] on [address] to confirm patients dose of Viibryd. Dose confirmed. Dr. Jayjay Miller notified, new orders received.

## 2019-12-30 ENCOUNTER — ANESTHESIA (OUTPATIENT)
Dept: ENDOSCOPY | Age: 64
DRG: 197 | End: 2019-12-30
Payer: MEDICARE

## 2019-12-30 ENCOUNTER — ANESTHESIA EVENT (OUTPATIENT)
Dept: ENDOSCOPY | Age: 64
DRG: 197 | End: 2019-12-30
Payer: MEDICARE

## 2019-12-30 VITALS — SYSTOLIC BLOOD PRESSURE: 159 MMHG | OXYGEN SATURATION: 95 % | DIASTOLIC BLOOD PRESSURE: 70 MMHG

## 2019-12-30 LAB
ANION GAP SERPL CALCULATED.3IONS-SCNC: 9 MMOL/L (ref 7–16)
BASOPHILS ABSOLUTE: 0.06 E9/L (ref 0–0.2)
BASOPHILS RELATIVE PERCENT: 0.8 % (ref 0–2)
BUN BLDV-MCNC: 17 MG/DL (ref 8–23)
C-REACTIVE PROTEIN: 6.1 MG/DL (ref 0–0.4)
CALCIUM SERPL-MCNC: 8.6 MG/DL (ref 8.6–10.2)
CHLORIDE BLD-SCNC: 103 MMOL/L (ref 98–107)
CO2: 26 MMOL/L (ref 22–29)
CREAT SERPL-MCNC: 1.1 MG/DL (ref 0.5–1)
EOSINOPHILS ABSOLUTE: 0.18 E9/L (ref 0.05–0.5)
EOSINOPHILS RELATIVE PERCENT: 2.5 % (ref 0–6)
GFR AFRICAN AMERICAN: >60
GFR NON-AFRICAN AMERICAN: 50 ML/MIN/1.73
GLUCOSE BLD-MCNC: 96 MG/DL (ref 74–99)
HCT VFR BLD CALC: 34.3 % (ref 34–48)
HEMOGLOBIN: 10.6 G/DL (ref 11.5–15.5)
HIV-1 AND HIV-2 ANTIBODIES: NORMAL
IMMATURE GRANULOCYTES #: 0.01 E9/L
IMMATURE GRANULOCYTES %: 0.1 % (ref 0–5)
L. PNEUMOPHILA SEROGP 1 UR AG: NORMAL
LYMPHOCYTES ABSOLUTE: 1.38 E9/L (ref 1.5–4)
LYMPHOCYTES RELATIVE PERCENT: 19 % (ref 20–42)
MCH RBC QN AUTO: 29.3 PG (ref 26–35)
MCHC RBC AUTO-ENTMCNC: 30.9 % (ref 32–34.5)
MCV RBC AUTO: 94.8 FL (ref 80–99.9)
MONOCYTES ABSOLUTE: 0.64 E9/L (ref 0.1–0.95)
MONOCYTES RELATIVE PERCENT: 8.8 % (ref 2–12)
NEUTROPHILS ABSOLUTE: 5 E9/L (ref 1.8–7.3)
NEUTROPHILS RELATIVE PERCENT: 68.8 % (ref 43–80)
PDW BLD-RTO: 13.1 FL (ref 11.5–15)
PLATELET # BLD: 304 E9/L (ref 130–450)
PMV BLD AUTO: 9.5 FL (ref 7–12)
POTASSIUM REFLEX MAGNESIUM: 4.2 MMOL/L (ref 3.5–5)
RBC # BLD: 3.62 E12/L (ref 3.5–5.5)
SEDIMENTATION RATE, ERYTHROCYTE: 59 MM/HR (ref 0–20)
SODIUM BLD-SCNC: 138 MMOL/L (ref 132–146)
STREP PNEUMONIAE ANTIGEN, URINE: NORMAL
WBC # BLD: 7.3 E9/L (ref 4.5–11.5)

## 2019-12-30 PROCEDURE — 2580000003 HC RX 258: Performed by: INTERNAL MEDICINE

## 2019-12-30 PROCEDURE — 6370000000 HC RX 637 (ALT 250 FOR IP): Performed by: INTERNAL MEDICINE

## 2019-12-30 PROCEDURE — 82784 ASSAY IGA/IGD/IGG/IGM EACH: CPT

## 2019-12-30 PROCEDURE — 0B9D8ZX DRAINAGE OF RIGHT MIDDLE LUNG LOBE, VIA NATURAL OR ARTIFICIAL OPENING ENDOSCOPIC, DIAGNOSTIC: ICD-10-PCS | Performed by: INTERNAL MEDICINE

## 2019-12-30 PROCEDURE — 86140 C-REACTIVE PROTEIN: CPT

## 2019-12-30 PROCEDURE — 87116 MYCOBACTERIA CULTURE: CPT

## 2019-12-30 PROCEDURE — 87281 PNEUMOCYSTIS CARINII AG IF: CPT

## 2019-12-30 PROCEDURE — 87102 FUNGUS ISOLATION CULTURE: CPT

## 2019-12-30 PROCEDURE — 99231 SBSQ HOSP IP/OBS SF/LOW 25: CPT | Performed by: INTERNAL MEDICINE

## 2019-12-30 PROCEDURE — 89051 BODY FLUID CELL COUNT: CPT

## 2019-12-30 PROCEDURE — 87205 SMEAR GRAM STAIN: CPT

## 2019-12-30 PROCEDURE — 2709999900 HC NON-CHARGEABLE SUPPLY: Performed by: INTERNAL MEDICINE

## 2019-12-30 PROCEDURE — 80048 BASIC METABOLIC PNL TOTAL CA: CPT

## 2019-12-30 PROCEDURE — 2580000003 HC RX 258: Performed by: NURSE ANESTHETIST, CERTIFIED REGISTERED

## 2019-12-30 PROCEDURE — 7100000010 HC PHASE II RECOVERY - FIRST 15 MIN: Performed by: INTERNAL MEDICINE

## 2019-12-30 PROCEDURE — 85651 RBC SED RATE NONAUTOMATED: CPT

## 2019-12-30 PROCEDURE — 0B9G8ZX DRAINAGE OF LEFT UPPER LUNG LOBE, VIA NATURAL OR ARTIFICIAL OPENING ENDOSCOPIC, DIAGNOSTIC: ICD-10-PCS | Performed by: INTERNAL MEDICINE

## 2019-12-30 PROCEDURE — 87206 SMEAR FLUORESCENT/ACID STAI: CPT

## 2019-12-30 PROCEDURE — 85025 COMPLETE CBC W/AUTO DIFF WBC: CPT

## 2019-12-30 PROCEDURE — 36415 COLL VENOUS BLD VENIPUNCTURE: CPT

## 2019-12-30 PROCEDURE — 3609010800 HC BRONCHOSCOPY ALVEOLAR LAVAGE: Performed by: INTERNAL MEDICINE

## 2019-12-30 PROCEDURE — 87070 CULTURE OTHR SPECIMN AEROBIC: CPT

## 2019-12-30 PROCEDURE — 94640 AIRWAY INHALATION TREATMENT: CPT

## 2019-12-30 PROCEDURE — 2500000003 HC RX 250 WO HCPCS: Performed by: INTERNAL MEDICINE

## 2019-12-30 PROCEDURE — 7100000011 HC PHASE II RECOVERY - ADDTL 15 MIN: Performed by: INTERNAL MEDICINE

## 2019-12-30 PROCEDURE — 88112 CYTOPATH CELL ENHANCE TECH: CPT

## 2019-12-30 PROCEDURE — 1200000000 HC SEMI PRIVATE

## 2019-12-30 PROCEDURE — 6360000002 HC RX W HCPCS: Performed by: NURSE ANESTHETIST, CERTIFIED REGISTERED

## 2019-12-30 PROCEDURE — 3700000000 HC ANESTHESIA ATTENDED CARE: Performed by: INTERNAL MEDICINE

## 2019-12-30 PROCEDURE — 87015 SPECIMEN INFECT AGNT CONCNTJ: CPT

## 2019-12-30 PROCEDURE — 3700000001 HC ADD 15 MINUTES (ANESTHESIA): Performed by: INTERNAL MEDICINE

## 2019-12-30 RX ORDER — LIDOCAINE HYDROCHLORIDE 20 MG/ML
INJECTION, SOLUTION EPIDURAL; INFILTRATION; INTRACAUDAL; PERINEURAL PRN
Status: DISCONTINUED | OUTPATIENT
Start: 2019-12-30 | End: 2019-12-30 | Stop reason: ALTCHOICE

## 2019-12-30 RX ORDER — SODIUM CHLORIDE 9 MG/ML
INJECTION, SOLUTION INTRAVENOUS CONTINUOUS PRN
Status: DISCONTINUED | OUTPATIENT
Start: 2019-12-30 | End: 2019-12-30 | Stop reason: SDUPTHER

## 2019-12-30 RX ORDER — LIDOCAINE HYDROCHLORIDE 20 MG/ML
SOLUTION OROPHARYNGEAL PRN
Status: DISCONTINUED | OUTPATIENT
Start: 2019-12-30 | End: 2019-12-30 | Stop reason: ALTCHOICE

## 2019-12-30 RX ORDER — PROPOFOL 10 MG/ML
INJECTION, EMULSION INTRAVENOUS PRN
Status: DISCONTINUED | OUTPATIENT
Start: 2019-12-30 | End: 2019-12-30 | Stop reason: SDUPTHER

## 2019-12-30 RX ORDER — SODIUM CHLORIDE 0.9 % (FLUSH) 0.9 %
10 SYRINGE (ML) INJECTION EVERY 12 HOURS SCHEDULED
Status: DISCONTINUED | OUTPATIENT
Start: 2019-12-30 | End: 2019-12-30 | Stop reason: HOSPADM

## 2019-12-30 RX ORDER — SODIUM CHLORIDE 0.9 % (FLUSH) 0.9 %
10 SYRINGE (ML) INJECTION PRN
Status: DISCONTINUED | OUTPATIENT
Start: 2019-12-30 | End: 2019-12-30 | Stop reason: HOSPADM

## 2019-12-30 RX ADMIN — VILAZODONE HYDROCHLORIDE 40 MG: 40 TABLET ORAL at 13:10

## 2019-12-30 RX ADMIN — PROPOFOL 150 MG: 10 INJECTION, EMULSION INTRAVENOUS at 09:49

## 2019-12-30 RX ADMIN — ACETAMINOPHEN 650 MG: 325 TABLET ORAL at 13:01

## 2019-12-30 RX ADMIN — SODIUM CHLORIDE: 9 INJECTION, SOLUTION INTRAVENOUS at 09:46

## 2019-12-30 RX ADMIN — SODIUM CHLORIDE, PRESERVATIVE FREE 10 ML: 5 INJECTION INTRAVENOUS at 21:44

## 2019-12-30 RX ADMIN — IPRATROPIUM BROMIDE AND ALBUTEROL SULFATE 1 AMPULE: .5; 3 SOLUTION RESPIRATORY (INHALATION) at 16:17

## 2019-12-30 ASSESSMENT — PAIN DESCRIPTION - PROGRESSION: CLINICAL_PROGRESSION: NOT CHANGED

## 2019-12-30 ASSESSMENT — PAIN SCALES - GENERAL
PAINLEVEL_OUTOF10: 9
PAINLEVEL_OUTOF10: 0
PAINLEVEL_OUTOF10: 6

## 2019-12-30 ASSESSMENT — PAIN DESCRIPTION - DESCRIPTORS: DESCRIPTORS: ACHING;DISCOMFORT

## 2019-12-30 ASSESSMENT — PAIN DESCRIPTION - FREQUENCY: FREQUENCY: CONTINUOUS

## 2019-12-30 ASSESSMENT — PAIN DESCRIPTION - ONSET: ONSET: ON-GOING

## 2019-12-30 ASSESSMENT — PAIN DESCRIPTION - PAIN TYPE: TYPE: SURGICAL PAIN

## 2019-12-30 ASSESSMENT — PAIN DESCRIPTION - LOCATION: LOCATION: THROAT;CHEST

## 2019-12-30 ASSESSMENT — PAIN - FUNCTIONAL ASSESSMENT: PAIN_FUNCTIONAL_ASSESSMENT: ACTIVITIES ARE NOT PREVENTED

## 2019-12-30 NOTE — CARE COORDINATION
Met at bedside with pt/daughter Lizzy Aguiar; introduced myself as an RN case manager and explained my role. Discussed current course of treatment/plan of care. Pt expressed understanding. states she just moved to [place] from [facility] day of admission. Pt lives alone independently ; no hx HHC/DOROTHY. Has nebulizer through [facility], but is switching to [facility]. Request RX for nebulizer at discharge.(daughter works for [employer]). Plan is for return home at discharge with nebulizers. Will follow . Lucila Marin.

## 2019-12-30 NOTE — ANESTHESIA PRE PROCEDURE
Department of Anesthesiology  Preprocedure Note       Name:  Ramone Gibson   Age:  60 y.o.  :  1955                                          MRN:  [id number]         Date:  2019      Surgeon: Grisel Weir):  Angus Byrd MD    Procedure: BRONCHOSCOPY DIAGNOSTIC OR CELL [address] ONLY (N/A )    Medications prior to admission:   Prior to Admission medications    Medication Sig Start Date End Date Taking?  Authorizing Provider   vilazodone HCl (VILAZODONE HCL) 40 MG TABS Take 40 mg by mouth daily   Yes Historical Provider, MD   albuterol sulfate  (90 Base) MCG/ACT inhaler Inhale 2 puffs into the lungs 19  Yes Historical Provider, MD       Current medications:    Current Facility-Administered Medications   Medication Dose Route Frequency Provider Last Rate Last Dose    vilazodone HCl (VIIBRYD) TABS 40 mg  40 mg Oral Daily Anthony Servin MD   40 mg at 19 1356    guaiFENesin-dextromethorphan (ROBITUSSIN DM) 100-10 MG/5ML syrup 5 mL  5 mL Oral Q4H PRN Anthony Servin MD   5 mL at 19 1737    sodium chloride flush 0.9 % injection 10 mL  10 mL Intravenous PRN Gale Joshi MD        ipratropium-albuterol (DUONEB) nebulizer solution 1 ampule  1 ampule Inhalation Q4H WA Gale Joshi MD   1 ampule at 19    sodium chloride flush 0.9 % injection 10 mL  10 mL Intravenous 2 times per day Gale Joshi MD   10 mL at 19 2136    sodium chloride flush 0.9 % injection 10 mL  10 mL Intravenous PRN Gale Joshi MD        magnesium hydroxide (MILK OF MAGNESIA) 400 MG/5ML suspension 30 mL  30 mL Oral Daily PRN Gale Joshi MD        ondansetron (ZOFRAN) injection 4 mg  4 mg Intravenous Q6H PRN Gale Joshi MD        enoxaparin (LOVENOX) injection 40 mg  40 mg Subcutaneous Daily Gale Joshi MD   40 mg at 19 0841    acetaminophen (TYLENOL) tablet 650 mg  650 mg Oral Q4H PRN Gale Joshi MD   650 mg at 19 1356    budesonide (PULMICORT) nebulizer suspension 500 mcg  500 mcg Nebulization BID Luis Fernando Michele MD   500 mcg at 19    formoterol (PERFOROMIST) nebulizer solution 20 mcg  20 mcg Nebulization Q12H Luis Fernando Michele MD   20 mcg at 19 2018       Allergies:  No Known Allergies    Problem List:    Patient Active Problem List   Diagnosis Code    Organizing pneumonia ([facility] J84.89    Mycobacterium avium complex ([facility] A31.0    Bronchiectasis ([facility] J47.9    COPD (chronic obstructive pulmonary disease) ([facility] J44.9    Pneumonia due to organism J18.9       Past Medical History:        Diagnosis Date    Bronchiectasis ([facility]     COPD (chronic obstructive pulmonary disease) ([facility]     Mycobacterium avium complex ([facility]        Past Surgical History:  History reviewed. No pertinent surgical history. Social History:    Social History     Tobacco Use    Smoking status: Former Smoker     Packs/day: 1.00     Years: 20.00     Pack years: 20.00     Types: Cigarettes     Start date: 1976     Last attempt to quit: 1997     Years since quittin.0    Smokeless tobacco: Never Used   Substance Use Topics    Alcohol use: No                                Counseling given: Not Answered      Vital Signs (Current):   Vitals:    19 0727 19 2019 19 2130 19 0742   BP: (!) 103/53  (!) 123/59 (!) 111/56   Pulse: 77  95 86   Resp: 16  16 16   Temp: 97.5 °F (36.4 °C)  97.8 °F (36.6 °C) 98.4 °F (36.9 °C)   TempSrc: Oral  Oral Oral   SpO2: 93% 96% 96% 93%   Weight:       Height:                                                  BP Readings from Last 3 Encounters:   19 (!) 111/56   17 129/75       NPO Status:                                                                                 BMI:   Wt Readings from Last 3 Encounters:   19 107 lb 6.4 oz (48.7 kg)     Body mass index is 19.64 kg/m².     CBC:   Lab Results   Component Value Date    WBC 7.3 2019    RBC 3.62 2019

## 2019-12-30 NOTE — PROGRESS NOTES
Nursing Transfer Note    Data:  Summary of patients progress: S/P bronchoscopy  Reason for transfer: inpatient    Action:  Explained reason for transfer to Patient. Report given to: Dakotah Resendiz, using RN Handoff Navigator.   Mode of transportation: cart     Response:  RN Recommendations: see orders/notes/MAR

## 2019-12-30 NOTE — PROGRESS NOTES
MPV 9.2 9.4 9.5       CBC with Differential:    Lab Results   Component Value Date    WBC 7.3 12/30/2019    RBC 3.62 12/30/2019    HGB 10.6 12/30/2019    HCT 34.3 12/30/2019     12/30/2019    MCV 94.8 12/30/2019    MCH 29.3 12/30/2019    MCHC 30.9 12/30/2019    RDW 13.1 12/30/2019    LYMPHOPCT 19.0 12/30/2019    MONOPCT 8.8 12/30/2019    BASOPCT 0.8 12/30/2019    MONOSABS 0.64 12/30/2019    LYMPHSABS 1.38 12/30/2019    EOSABS 0.18 12/30/2019    BASOSABS 0.06 12/30/2019     BMP:    Lab Results   Component Value Date     12/30/2019    K 4.2 12/30/2019     12/30/2019    CO2 26 12/30/2019    BUN 17 12/30/2019    CREATININE 1.1 12/30/2019    CALCIUM 8.6 12/30/2019    GFRAA >60 12/30/2019    LABGLOM 50 12/30/2019    GLUCOSE 96 12/30/2019        Radiology:   CT CHEST W CONTRAST   Final Result   Organizing pneumonia in the right middle lobe and lingular regions   with crowded ectatic airway suggesting bronchiectasis in the right   middle lobe. In the right lower lobe, peripheral nodular appearing densities with   some central cavitation suggest pulmonary abscesses, such as with   septic emboli. There is no evidence of pleural effusion or cardiac decompensation. ALERT:  THIS IS AN ABNORMAL REPORT-bilateral organizing pneumonia with   pulmonary abscesses on the right suggesting septic emboli. XR CHEST STANDARD (2 VW)   Final Result   : Patchy infiltrate in the regions of the right midlobe and   in the right lower lobe. Can represent developing acute pneumonia. Please correlate clinically. Assessment:    Principal Problem:    Organizing pneumonia ([address]  Active Problems:    Mycobacterium avium complex ([address]    Bronchiectasis ([address]    COPD (chronic obstructive pulmonary disease) ([address]    Pneumonia due to organism  Resolved Problems:    * No resolved hospital problems. *      Plan:  1. RSV infection with possible pneumonia. ID note reviewed and appreciate input.  Monitor off

## 2019-12-30 NOTE — PROGRESS NOTES
Received call from Endoscopy, Bronch is scheduled for 10am. Transport will  pt at 0930. Notified bedside RN, [employer]. Endo is aware pt is in isolation.

## 2019-12-31 LAB
ANION GAP SERPL CALCULATED.3IONS-SCNC: 7 MMOL/L (ref 7–16)
APPEARANCE FLUID: NORMAL
APPEARANCE FLUID: NORMAL
BASOPHILS ABSOLUTE: 0.06 E9/L (ref 0–0.2)
BASOPHILS RELATIVE PERCENT: 0.8 % (ref 0–2)
BUN BLDV-MCNC: 17 MG/DL (ref 8–23)
CALCIUM SERPL-MCNC: 8.6 MG/DL (ref 8.6–10.2)
CELL COUNT FLUID TYPE: NORMAL
CELL COUNT FLUID TYPE: NORMAL
CHLORIDE BLD-SCNC: 103 MMOL/L (ref 98–107)
CO2: 27 MMOL/L (ref 22–29)
COLOR FLUID: COLORLESS
COLOR FLUID: NORMAL
CREAT SERPL-MCNC: 1.1 MG/DL (ref 0.5–1)
EOSINOPHIL FLUID: 2 %
EOSINOPHILS ABSOLUTE: 0.34 E9/L (ref 0.05–0.5)
EOSINOPHILS RELATIVE PERCENT: 4.8 % (ref 0–6)
GFR AFRICAN AMERICAN: >60
GFR NON-AFRICAN AMERICAN: 50 ML/MIN/1.73
GLUCOSE BLD-MCNC: 97 MG/DL (ref 74–99)
GRAM STAIN ORDERABLE: NORMAL
GRAM STAIN ORDERABLE: NORMAL
HCT VFR BLD CALC: 34.5 % (ref 34–48)
HEMOGLOBIN: 10.5 G/DL (ref 11.5–15.5)
IMMATURE GRANULOCYTES #: 0.03 E9/L
IMMATURE GRANULOCYTES %: 0.4 % (ref 0–5)
LYMPHOCYTES ABSOLUTE: 1.35 E9/L (ref 1.5–4)
LYMPHOCYTES RELATIVE PERCENT: 19.1 % (ref 20–42)
LYMPHOCYTES, BODY FLUID: 15 %
LYMPHOCYTES, BODY FLUID: 6 %
MCH RBC QN AUTO: 29.2 PG (ref 26–35)
MCHC RBC AUTO-ENTMCNC: 30.4 % (ref 32–34.5)
MCV RBC AUTO: 95.8 FL (ref 80–99.9)
MONOCYTE, FLUID: 7 %
MONOCYTE, FLUID: 8 %
MONOCYTES ABSOLUTE: 0.55 E9/L (ref 0.1–0.95)
MONOCYTES RELATIVE PERCENT: 7.8 % (ref 2–12)
MRSA CULTURE ONLY: NORMAL
NEUTROPHIL, FLUID: 75 %
NEUTROPHIL, FLUID: 87 %
NEUTROPHILS ABSOLUTE: 4.73 E9/L (ref 1.8–7.3)
NEUTROPHILS RELATIVE PERCENT: 67.1 % (ref 43–80)
NUCLEATED CELLS FLUID: 1900 /UL
NUCLEATED CELLS FLUID: 450 /UL
PDW BLD-RTO: 13.1 FL (ref 11.5–15)
PLATELET # BLD: 287 E9/L (ref 130–450)
PMV BLD AUTO: 8.8 FL (ref 7–12)
POTASSIUM REFLEX MAGNESIUM: 4.3 MMOL/L (ref 3.5–5)
RBC # BLD: 3.6 E12/L (ref 3.5–5.5)
RBC FLUID: 3100 /UL
RBC FLUID: <2000 /UL
SODIUM BLD-SCNC: 137 MMOL/L (ref 132–146)
WBC # BLD: 7.1 E9/L (ref 4.5–11.5)

## 2019-12-31 PROCEDURE — 6370000000 HC RX 637 (ALT 250 FOR IP): Performed by: INTERNAL MEDICINE

## 2019-12-31 PROCEDURE — 99233 SBSQ HOSP IP/OBS HIGH 50: CPT | Performed by: INTERNAL MEDICINE

## 2019-12-31 PROCEDURE — 85025 COMPLETE CBC W/AUTO DIFF WBC: CPT

## 2019-12-31 PROCEDURE — 2580000003 HC RX 258: Performed by: INTERNAL MEDICINE

## 2019-12-31 PROCEDURE — 6360000002 HC RX W HCPCS: Performed by: INTERNAL MEDICINE

## 2019-12-31 PROCEDURE — 1200000000 HC SEMI PRIVATE

## 2019-12-31 PROCEDURE — 80048 BASIC METABOLIC PNL TOTAL CA: CPT

## 2019-12-31 PROCEDURE — 36415 COLL VENOUS BLD VENIPUNCTURE: CPT

## 2019-12-31 PROCEDURE — 99231 SBSQ HOSP IP/OBS SF/LOW 25: CPT | Performed by: INTERNAL MEDICINE

## 2019-12-31 PROCEDURE — 94640 AIRWAY INHALATION TREATMENT: CPT

## 2019-12-31 RX ORDER — IPRATROPIUM BROMIDE AND ALBUTEROL SULFATE 2.5; .5 MG/3ML; MG/3ML
3 SOLUTION RESPIRATORY (INHALATION)
Qty: 360 ML | Refills: 0 | Status: SHIPPED | OUTPATIENT
Start: 2019-12-31 | End: 2020-01-01

## 2019-12-31 RX ORDER — SODIUM CHLORIDE FOR INHALATION 3 %
4 VIAL, NEBULIZER (ML) INHALATION EVERY 12 HOURS
Status: COMPLETED | OUTPATIENT
Start: 2019-12-31 | End: 2020-01-01

## 2019-12-31 RX ORDER — GUAIFENESIN 400 MG/1
400 TABLET ORAL EVERY 8 HOURS
Status: DISCONTINUED | OUTPATIENT
Start: 2019-12-31 | End: 2020-01-01 | Stop reason: HOSPADM

## 2019-12-31 RX ADMIN — FORMOTEROL FUMARATE DIHYDRATE 20 MCG: 20 SOLUTION RESPIRATORY (INHALATION) at 07:58

## 2019-12-31 RX ADMIN — SODIUM CHLORIDE SOLN NEBU 3% 4 ML: 3 NEBU SOLN at 22:45

## 2019-12-31 RX ADMIN — GUAIFENESIN 400 MG: 400 TABLET ORAL at 22:39

## 2019-12-31 RX ADMIN — IPRATROPIUM BROMIDE AND ALBUTEROL SULFATE 1 AMPULE: .5; 3 SOLUTION RESPIRATORY (INHALATION) at 07:57

## 2019-12-31 RX ADMIN — IPRATROPIUM BROMIDE AND ALBUTEROL SULFATE 1 AMPULE: .5; 3 SOLUTION RESPIRATORY (INHALATION) at 15:42

## 2019-12-31 RX ADMIN — IPRATROPIUM BROMIDE AND ALBUTEROL SULFATE 1 AMPULE: .5; 3 SOLUTION RESPIRATORY (INHALATION) at 11:28

## 2019-12-31 RX ADMIN — FORMOTEROL FUMARATE DIHYDRATE 20 MCG: 20 SOLUTION RESPIRATORY (INHALATION) at 19:44

## 2019-12-31 RX ADMIN — BUDESONIDE 500 MCG: 0.5 SUSPENSION RESPIRATORY (INHALATION) at 19:43

## 2019-12-31 RX ADMIN — SODIUM CHLORIDE, PRESERVATIVE FREE 10 ML: 5 INJECTION INTRAVENOUS at 08:31

## 2019-12-31 RX ADMIN — IPRATROPIUM BROMIDE AND ALBUTEROL SULFATE 1 AMPULE: .5; 3 SOLUTION RESPIRATORY (INHALATION) at 19:43

## 2019-12-31 RX ADMIN — BUDESONIDE 500 MCG: 0.5 SUSPENSION RESPIRATORY (INHALATION) at 07:58

## 2019-12-31 RX ADMIN — SODIUM CHLORIDE, PRESERVATIVE FREE 10 ML: 5 INJECTION INTRAVENOUS at 22:39

## 2019-12-31 RX ADMIN — VILAZODONE HYDROCHLORIDE 40 MG: 40 TABLET ORAL at 08:31

## 2019-12-31 ASSESSMENT — PAIN SCALES - GENERAL
PAINLEVEL_OUTOF10: 0
PAINLEVEL_OUTOF10: 0

## 2019-12-31 NOTE — PROGRESS NOTES
input. Monitor off abx. Pt s/p bronch  2. Bronchiectasis pulmonary following. S/p bronch. Nebs  3. Hx of MAC pulmonary following  4.  Hyperglycemia monitor        Electronically signed by Danny Case DO on 12/31/2019 at 5:31 PM

## 2019-12-31 NOTE — PROGRESS NOTES
Dr. Rodriguez Ring in to see pt & request she not be discharged yet. Pt is still having SOB & wheezing. Dr. Diamond Walker notified. New order to cancel discharge.

## 2019-12-31 NOTE — PROGRESS NOTES
[address] [place] [address] [facility]  Progress Note    SUBJECTIVE:  Chief Complaint   Patient presents with    Shortness of Breath     Has been sick since Georgi, excessive coughing, history of COPD; patient states she is immunocompromised      Feeling better. Less cough, pale yellow sputum. C/o right ear pain. No n/v/d. No fevers    Review of systems:  As stated above in the chief complaint, otherwise negative. Medications:  Scheduled Meds:   vilazodone HCl  40 mg Oral Daily    ipratropium-albuterol  1 ampule Inhalation Q4H WA    sodium chloride flush  10 mL Intravenous 2 times per day    enoxaparin  40 mg Subcutaneous Daily    budesonide  500 mcg Nebulization BID    formoterol  20 mcg Nebulization Q12H     Continuous Infusions:  PRN Meds:guaiFENesin-dextromethorphan, sodium chloride flush, sodium chloride flush, magnesium hydroxide, ondansetron, acetaminophen    OBJECTIVE:  BP (!) 102/53   Pulse 73   Temp 96.9 °F (36.1 °C) (Axillary)   Resp 18   Ht 5' 2\" (1.575 m)   Wt 106 lb 7 oz (48.3 kg)   SpO2 93%   BMI 19.47 kg/m²   Temp  Av.5 °F (36.4 °C)  Min: 96.9 °F (36.1 °C)  Max: 98 °F (36.7 °C)  Constitutional: The patient is awake, alert, and oriented. Sitting up in bed in NAD  Skin: Warm and dry. No rashes were noted. HEENT: Round and reactive pupils. Moist mucous membranes. No ulcerations or thrush. Neck: Supple to movements. Chest: No use of accessory muscles to breathe. Symmetrical expansion. Diminished right base  Cardiovascular: S1 and S2 are rhythmic and regular. No murmurs appreciated. Abdomen: Positive bowel sounds to auscultation. nondistended  Extremities: No clubbing, no cyanosis, no edema.   Lines: peripheral    Laboratory and Tests Review:  Lab Results   Component Value Date    WBC 7.1 2019    WBC 7.3 2019    WBC 7.3 2019    HGB 10.5 (L) 2019    HCT 34.5 2019    MCV 95.8 2019     2019     Lab Results Component Value Date    NEUTROABS 4.73 12/31/2019    NEUTROABS 5.00 12/30/2019    NEUTROABS 6.53 12/29/2019     No results found for: CRPHS  No results found for: ALT, AST, GGT, ALKPHOS, BILITOT  Lab Results   Component Value Date     12/31/2019    K 4.3 12/31/2019     12/31/2019    CO2 27 12/31/2019    BUN 17 12/31/2019    CREATININE 1.1 12/31/2019    CREATININE 1.1 12/30/2019    CREATININE 0.9 12/29/2019    GFRAA >60 12/31/2019    LABGLOM 50 12/31/2019    GLUCOSE 97 12/31/2019    CALCIUM 8.6 12/31/2019     Lab Results   Component Value Date    CRP 6.1 (H) 12/30/2019    CRP 13.0 (H) 12/28/2019     Lab Results   Component Value Date    SEDRATE 59 (H) 12/30/2019    SEDRATE 66 (H) 12/28/2019     Radiology:      Microbiology:   Blood cx no growth  Resp viral panel + RSV  Urine legionella/strep pneumo neg    Assessment:  · Organizing pneumonia. No evidence of consolidation to suggest bacterial pneumonia. +RSV  · History of MAC, treated with Rifampin, Ethambutol and Clarithromycin for 2 years dating back to 2016. She may have had a recurrence  · Bronchiectasis  · Small lung cavitary lesions associated to the above    Plan:    · Droplet isolation  · F/u cx   · F/u BAL cx, AFB. · Can discharge from ID perspective. F/u in 4 wks as outpt to check bronch results for AFB  · Will follow with you    Yolanda Mccracken  12:26 PM  12/31/2019     Patient seen and examined. I had a face to face encounter with the patient. Agree with exam.  Assessment and plan as outlined above and directed by me. Addition and corrections were done as deemed appropriate. My exam and plan include: The patient is feeling about the same. She is ready to go home and began to convalesce from her hours to be there. I gave her a business card and asked her to call the office and make a follow-up appointment in about 4 weeks. We should have some results of AFB cultures from her BAL by then.   She was also instructed to bring a CD of her

## 2020-01-01 VITALS
BODY MASS INDEX: 19.54 KG/M2 | HEART RATE: 95 BPM | WEIGHT: 106.2 LBS | OXYGEN SATURATION: 96 % | TEMPERATURE: 97.8 F | RESPIRATION RATE: 18 BRPM | HEIGHT: 62 IN | DIASTOLIC BLOOD PRESSURE: 52 MMHG | SYSTOLIC BLOOD PRESSURE: 104 MMHG

## 2020-01-01 LAB
ANION GAP SERPL CALCULATED.3IONS-SCNC: 12 MMOL/L (ref 7–16)
BASOPHILS ABSOLUTE: 0.07 E9/L (ref 0–0.2)
BASOPHILS RELATIVE PERCENT: 1.1 % (ref 0–2)
BUN BLDV-MCNC: 20 MG/DL (ref 8–23)
CALCIUM SERPL-MCNC: 8.8 MG/DL (ref 8.6–10.2)
CHLORIDE BLD-SCNC: 102 MMOL/L (ref 98–107)
CO2: 26 MMOL/L (ref 22–29)
CREAT SERPL-MCNC: 1.1 MG/DL (ref 0.5–1)
CULTURE, RESPIRATORY: NORMAL
EOSINOPHILS ABSOLUTE: 0.36 E9/L (ref 0.05–0.5)
EOSINOPHILS RELATIVE PERCENT: 5.5 % (ref 0–6)
GFR AFRICAN AMERICAN: >60
GFR NON-AFRICAN AMERICAN: 50 ML/MIN/1.73
GLUCOSE BLD-MCNC: 98 MG/DL (ref 74–99)
HCT VFR BLD CALC: 36.2 % (ref 34–48)
HEMOGLOBIN: 11.1 G/DL (ref 11.5–15.5)
IGA: 138 MG/DL (ref 70–400)
IGG: 745 MG/DL (ref 700–1600)
IGM: 386 MG/DL (ref 40–230)
IMMATURE GRANULOCYTES #: 0.03 E9/L
IMMATURE GRANULOCYTES %: 0.5 % (ref 0–5)
LYMPHOCYTES ABSOLUTE: 1.36 E9/L (ref 1.5–4)
LYMPHOCYTES RELATIVE PERCENT: 20.7 % (ref 20–42)
MCH RBC QN AUTO: 29.4 PG (ref 26–35)
MCHC RBC AUTO-ENTMCNC: 30.7 % (ref 32–34.5)
MCV RBC AUTO: 96 FL (ref 80–99.9)
MONOCYTES ABSOLUTE: 0.58 E9/L (ref 0.1–0.95)
MONOCYTES RELATIVE PERCENT: 8.8 % (ref 2–12)
NEUTROPHILS ABSOLUTE: 4.18 E9/L (ref 1.8–7.3)
NEUTROPHILS RELATIVE PERCENT: 63.4 % (ref 43–80)
PDW BLD-RTO: 13.2 FL (ref 11.5–15)
PLATELET # BLD: 314 E9/L (ref 130–450)
PMV BLD AUTO: 8.9 FL (ref 7–12)
POTASSIUM REFLEX MAGNESIUM: 4.4 MMOL/L (ref 3.5–5)
RBC # BLD: 3.77 E12/L (ref 3.5–5.5)
SMEAR, RESPIRATORY: NORMAL
SODIUM BLD-SCNC: 140 MMOL/L (ref 132–146)
WBC # BLD: 6.6 E9/L (ref 4.5–11.5)

## 2020-01-01 PROCEDURE — 85025 COMPLETE CBC W/AUTO DIFF WBC: CPT

## 2020-01-01 PROCEDURE — 94640 AIRWAY INHALATION TREATMENT: CPT

## 2020-01-01 PROCEDURE — 2580000003 HC RX 258: Performed by: INTERNAL MEDICINE

## 2020-01-01 PROCEDURE — 99239 HOSP IP/OBS DSCHRG MGMT >30: CPT | Performed by: INTERNAL MEDICINE

## 2020-01-01 PROCEDURE — 80048 BASIC METABOLIC PNL TOTAL CA: CPT

## 2020-01-01 PROCEDURE — 6370000000 HC RX 637 (ALT 250 FOR IP): Performed by: INTERNAL MEDICINE

## 2020-01-01 PROCEDURE — 6360000002 HC RX W HCPCS: Performed by: INTERNAL MEDICINE

## 2020-01-01 PROCEDURE — 36415 COLL VENOUS BLD VENIPUNCTURE: CPT

## 2020-01-01 RX ORDER — IPRATROPIUM BROMIDE AND ALBUTEROL SULFATE 2.5; .5 MG/3ML; MG/3ML
3 SOLUTION RESPIRATORY (INHALATION)
Qty: 360 ML | Refills: 0 | Status: SHIPPED | OUTPATIENT
Start: 2020-01-01

## 2020-01-01 RX ORDER — GUAIFENESIN/DEXTROMETHORPHAN 100-10MG/5
5 SYRUP ORAL EVERY 4 HOURS PRN
Qty: 120 ML | Refills: 0 | Status: SHIPPED | OUTPATIENT
Start: 2020-01-01 | End: 2020-01-11

## 2020-01-01 RX ORDER — GUAIFENESIN/DEXTROMETHORPHAN 100-10MG/5
5 SYRUP ORAL EVERY 4 HOURS PRN
Qty: 120 ML | Refills: 0 | Status: SHIPPED | OUTPATIENT
Start: 2020-01-01 | End: 2020-01-01

## 2020-01-01 RX ADMIN — BUDESONIDE 500 MCG: 0.5 SUSPENSION RESPIRATORY (INHALATION) at 09:09

## 2020-01-01 RX ADMIN — SODIUM CHLORIDE, PRESERVATIVE FREE 10 ML: 5 INJECTION INTRAVENOUS at 08:57

## 2020-01-01 RX ADMIN — FORMOTEROL FUMARATE DIHYDRATE 20 MCG: 20 SOLUTION RESPIRATORY (INHALATION) at 09:09

## 2020-01-01 RX ADMIN — VILAZODONE HYDROCHLORIDE 40 MG: 40 TABLET ORAL at 08:57

## 2020-01-01 RX ADMIN — IPRATROPIUM BROMIDE AND ALBUTEROL SULFATE 1 AMPULE: .5; 3 SOLUTION RESPIRATORY (INHALATION) at 09:09

## 2020-01-01 RX ADMIN — SODIUM CHLORIDE SOLN NEBU 3% 4 ML: 3 NEBU SOLN at 09:09

## 2020-01-01 ASSESSMENT — PAIN SCALES - GENERAL: PAINLEVEL_OUTOF10: 0

## 2020-01-01 NOTE — DISCHARGE SUMMARY
[address] [place] [address]       Hospitalist Physician Discharge Summary       Pre-service for Primary Care Physician  On day of discharge please call [phone] to establish with a primary care physician. Please inform them you need a Follow Up Appointment as well due to recent hospitalization. Thanks so much !!!        David Sharif MD  [address] [place] [address]    Schedule an appointment as soon as possible for a visit in 4 weeks      David Sharif MD  [address]  [phone]    Call in 1 week        Activity level: as diogo    Diet: DIET GENERAL;    Dispo:home     Condition at discharge: fair          Patient ID:  Princess Vance  [id number]  60 y.o.  1955    Admit date: 12/28/2019    Discharge date and time:  1/1/2020  9:00 AM    Admission Diagnoses: Principal Problem:    Organizing pneumonia [facility])  Active Problems:    Mycobacterium avium complex ([address]    Bronchiectasis ([address]    COPD (chronic obstructive pulmonary disease) ([address]    Pneumonia due to organism    RSV infection    Hyperglycemia  Resolved Problems:    * No resolved hospital problems. *      Discharge Diagnoses: Principal Problem:    Organizing pneumonia ([address]  Active Problems:    Mycobacterium avium complex ([address]    Bronchiectasis ([address]    COPD (chronic obstructive pulmonary disease) ([address]    Pneumonia due to organism    RSV infection    Hyperglycemia  Resolved Problems:    * No resolved hospital problems. *    rsv infection  brochiectassi  Hx of MAC  hyperglycemia      Consults:  IP CONSULT TO INFECTIOUS DISEASES  IP CONSULT TO PULMONOLOGY    Procedures: echo  Summary   Left ventricular size is grossly normal.   Mild left ventricular concentric hypertrophy noted. No regional wall motion abnormalities seen.    Ejection fraction is visually estimated at 60%    Bronchoscopy Post-op Diagnosis: pneumonia r/o [facility]    Hospital Course: Patient was admitted with

## 2020-01-02 LAB
BLOOD CULTURE, ROUTINE: NORMAL
CULTURE, BLOOD 2: NORMAL
CULTURE, RESPIRATORY: ABNORMAL
CULTURE, RESPIRATORY: ABNORMAL
ORGANISM: ABNORMAL
PNEUMOCYSTIS DFA: NORMAL
PNEUMOCYSTIS DFA: NORMAL
SMEAR, RESPIRATORY: ABNORMAL

## 2020-02-03 LAB
FUNGUS (MYCOLOGY) CULTURE: NORMAL
FUNGUS STAIN: NORMAL

## 2020-02-17 ENCOUNTER — OFFICE VISIT (OUTPATIENT)
Dept: PULMONOLOGY | Age: 65
End: 2020-02-17
Payer: MEDICARE

## 2020-02-17 VITALS
HEART RATE: 69 BPM | BODY MASS INDEX: 19.94 KG/M2 | SYSTOLIC BLOOD PRESSURE: 101 MMHG | RESPIRATION RATE: 14 BRPM | WEIGHT: 109 LBS | DIASTOLIC BLOOD PRESSURE: 53 MMHG | OXYGEN SATURATION: 96 %

## 2020-02-17 PROCEDURE — G8484 FLU IMMUNIZE NO ADMIN: HCPCS | Performed by: INTERNAL MEDICINE

## 2020-02-17 PROCEDURE — 99214 OFFICE O/P EST MOD 30 MIN: CPT | Performed by: INTERNAL MEDICINE

## 2020-02-17 PROCEDURE — G8420 CALC BMI NORM PARAMETERS: HCPCS | Performed by: INTERNAL MEDICINE

## 2020-02-17 PROCEDURE — 3017F COLORECTAL CA SCREEN DOC REV: CPT | Performed by: INTERNAL MEDICINE

## 2020-02-17 PROCEDURE — 99213 OFFICE O/P EST LOW 20 MIN: CPT | Performed by: INTERNAL MEDICINE

## 2020-02-17 PROCEDURE — 1036F TOBACCO NON-USER: CPT | Performed by: INTERNAL MEDICINE

## 2020-02-17 PROCEDURE — G8427 DOCREV CUR MEDS BY ELIG CLIN: HCPCS | Performed by: INTERNAL MEDICINE

## 2020-02-17 RX ORDER — BENZONATATE 100 MG/1
100 CAPSULE ORAL
Status: ON HOLD | COMMUNITY
Start: 2019-05-22 | End: 2020-05-20 | Stop reason: ALTCHOICE

## 2020-02-17 RX ORDER — NAPROXEN SODIUM 220 MG
440 TABLET ORAL PRN
Status: ON HOLD | COMMUNITY
End: 2020-05-30 | Stop reason: HOSPADM

## 2020-02-17 NOTE — PROGRESS NOTES
Hospital follow up in office today; coughing up yellow/dark mucous. Dr. Asia Burton discussed by phone pt's case with Dr. Nicol Bynum. Plan for sputum cultures x 3. Pt given specimen containers and instructions and lab reqs put in bags for pt to take when able to cough up mucous specimen. Plan for follow up in office in 3-4 mos; appt given.

## 2020-02-17 NOTE — PROGRESS NOTES
Pulmonary [facility]                             Pulmonary Consult/Progress Note :      CHIEF COMPLAINT:  Sob and cough    HISTORY OF PRESENT ILLNESS:      Known H/O MAC who  Presents with increased cough and sob for past 2-3 days. Was diagnosed with MAC about 5 yrs ago and treated at [facility] for about 18 months,   Smoke for about 15-20 years(20 PPY )  Quit smoking 20 years ago   Underwent Bronch   CT multiple cavitary lesions and bronchiectasis   Now on RA   Still with wheezing and cough       Past Medical History:   Diagnosis Date    Bronchiectasis ([address]     [facility]     Mycobacterium avium complex ([facility]      Past Surgical History:   Procedure Laterality Date    BRONCHOSCOPY N/A 12/30/2019    BRONCHOSCOPY ALVEOLAR LAVAGE performed by Batsheva Berry MD at [facility] ENDOSCOPY     Family history :reviewed Non contributory    Allergy None    Meds Reviewed MAR     Today Visit    Patient was diagnosed with MAC 2013 and she had abx for 18 month and she felt and then slow continue to have and she still has Sputum yellow and brown   She has SOB as well    PHYSICAL EXAM:      Vitals:    BP (!) 101/53   Pulse 69   Resp 14   Wt 109 lb (49.4 kg)   SpO2 96%   BMI 19.94 kg/m²     EYES:  Lids and lashes normal, pupils equal, round and reactive to light, extra ocular muscles intact, sclera clear, conjunctiva normal  ENT:  Normocephalic, without obvious abnormality, atraumatic, sinuses nontender on palpation, external ears without lesions, oral pharynx with moist mucus membranes, tonsils without erythema or exudates, gums normal and good dentition.   NECK:  Supple, symmetrical, trachea midline, no adenopathy, thyroid symmetric, not enlarged and no tenderness, skin normal  LUNGS:  Bilateral ronchi with cough  CARDIOVASCULAR:  Normal apical impulse, regular rate and rhythm, normal S1 and S2, no S3 or S4, and no murmur noted  ABDOMEN:  No scars, normal bowel sounds, soft, non-distended, non-tender, no masses palpated, no hepatosplenomegally  MUSCULOSKELETAL:  There is no redness, warmth, or swelling of the joints. Full range of motion noted. Motor strength is 5 out of 5 all extremities bilaterally. Tone is normal.  NEUROLOGIC:  Awake, alert, oriented to name, place and time. Cranial nerves II-XII are grossly intact. DATA:    CBC:   No results for input(s): WBC, HGB, HCT, MCV, PLT in the last 72 hours. BMP:  No results for input(s): NA, K, CL, CO2, PHOS, BUN, CREATININE in the last 72 hours. Invalid input(s): CA ALB:3,BILIDIR:3,BILITOT:3,ALKPHOS:3)@    PT/INR:   No results for input(s): PROTIME, INR in the last 72 hours. ABG:   No results for input(s): PH, PO2, PCO2, HCO3, BE, O2SAT, METHB, O2HB, COHB, O2CON, HHB, THB in the last 72 hours. l nodular/interstitial opacities bilateral    IMPRESSION/RECOMMENDATIONS:      Bronchiectasis  Hx of MAC, s/p treatment, (?) recurrent      Plan     1. Discuss case with ID ,will obtain samples of AFB ,revew culture until 2/17 am ,not identification of AFB yet but later on that day AFB culture resulted and still MAC  Review CT from admission    Will call ID again and see if she candidate for treatment again which I think she should and consider new nebs agent  She was encouraged to give new 3 samples ,I doubt she needs it now  5.  On RA, watch oxygenation on ambulation   7- Hypertonic saline 3 %   8- mucinex   May need chest vest as well ,will check with insurance    [address]

## 2020-02-20 ENCOUNTER — TELEPHONE (OUTPATIENT)
Dept: PULMONOLOGY | Age: 65
End: 2020-02-20

## 2020-02-20 NOTE — TELEPHONE ENCOUNTER
Call to Dr. Strong Worley office per Dr. Toshia Peterson's orders to inform reported labs form 2/17/2020 in [facility] [address]. Spoke with Mayra at [facility] office that culture was + for M. Avium Complex and Dr. Romi Chung saw pt in our office 2/17/2020 and pt was symptomatic with productive cough; pt took specimen cups/lab orders with her to take to lab when new sample(s) were available. Michelle Carey states \"this is an old finding\" for [employer]. Advised Dr. Romi Chung wanted to make Dr. Jose Luis Palacios aware of labs reported in Epic on 2/17/2020. Michelle Carey will update physician. Pt scheduled to return to their office 02/27/2020.

## 2020-02-21 ENCOUNTER — TELEPHONE (OUTPATIENT)
Dept: PULMONOLOGY | Age: 65
End: 2020-02-21

## 2020-02-21 NOTE — TELEPHONE ENCOUNTER
Dr. Gloria Drew spoke with Dr. Constanza Joya re: culture results. Dr. Constanza Joya to follow up with pt and manage antibiotics.

## 2020-02-28 ENCOUNTER — HOSPITAL ENCOUNTER (OUTPATIENT)
Age: 65
Discharge: HOME | End: 2020-02-28
Payer: MEDICARE

## 2020-02-28 PROBLEM — A31.9 MYCOBACTERIOSIS: Status: ACTIVE | Noted: 2020-02-28

## 2020-02-28 LAB
ALBUMIN SERPL-MCNC: 4.4 G/DL (ref 3.5–5.2)
ALP BLD-CCNC: 72 U/L (ref 35–104)
ALT SERPL-CCNC: 12 U/L (ref 0–32)
ANION GAP SERPL CALCULATED.3IONS-SCNC: 9 MMOL/L (ref 7–16)
AST SERPL-CCNC: 23 U/L (ref 0–31)
BASOPHILS ABSOLUTE: 0.08 E9/L (ref 0–0.2)
BASOPHILS RELATIVE PERCENT: 1.4 % (ref 0–2)
BILIRUB SERPL-MCNC: <0.2 MG/DL (ref 0–1.2)
BUN BLDV-MCNC: 24 MG/DL (ref 8–23)
C-REACTIVE PROTEIN: 0.6 MG/DL (ref 0–0.4)
CALCIUM SERPL-MCNC: 9.8 MG/DL (ref 8.6–10.2)
CHLORIDE BLD-SCNC: 102 MMOL/L (ref 98–107)
CO2: 30 MMOL/L (ref 22–29)
CREAT SERPL-MCNC: 1 MG/DL (ref 0.5–1)
EOSINOPHILS ABSOLUTE: 0.29 E9/L (ref 0.05–0.5)
EOSINOPHILS RELATIVE PERCENT: 4.9 % (ref 0–6)
GFR AFRICAN AMERICAN: >60
GFR NON-AFRICAN AMERICAN: 56 ML/MIN/1.73
GLUCOSE BLD-MCNC: 87 MG/DL (ref 74–99)
HCT VFR BLD CALC: 40 % (ref 34–48)
HEMOGLOBIN: 12.3 G/DL (ref 11.5–15.5)
IMMATURE GRANULOCYTES #: 0.02 E9/L
IMMATURE GRANULOCYTES %: 0.3 % (ref 0–5)
LYMPHOCYTES ABSOLUTE: 1.41 E9/L (ref 1.5–4)
LYMPHOCYTES RELATIVE PERCENT: 23.9 % (ref 20–42)
MCH RBC QN AUTO: 28.2 PG (ref 26–35)
MCHC RBC AUTO-ENTMCNC: 30.8 % (ref 32–34.5)
MCV RBC AUTO: 91.7 FL (ref 80–99.9)
MONOCYTES ABSOLUTE: 0.54 E9/L (ref 0.1–0.95)
MONOCYTES RELATIVE PERCENT: 9.2 % (ref 2–12)
NEUTROPHILS ABSOLUTE: 3.56 E9/L (ref 1.8–7.3)
NEUTROPHILS RELATIVE PERCENT: 60.3 % (ref 43–80)
PDW BLD-RTO: 13.3 FL (ref 11.5–15)
PLATELET # BLD: 291 E9/L (ref 130–450)
PMV BLD AUTO: 9 FL (ref 7–12)
POTASSIUM SERPL-SCNC: 4.9 MMOL/L (ref 3.5–5)
RBC # BLD: 4.36 E12/L (ref 3.5–5.5)
SEDIMENTATION RATE, ERYTHROCYTE: 41 MM/HR (ref 0–20)
SODIUM BLD-SCNC: 141 MMOL/L (ref 132–146)
TOTAL PROTEIN: 8 G/DL (ref 6.4–8.3)
WBC # BLD: 5.9 E9/L (ref 4.5–11.5)

## 2020-02-28 PROCEDURE — 36415 COLL VENOUS BLD VENIPUNCTURE: CPT

## 2020-02-28 PROCEDURE — 86140 C-REACTIVE PROTEIN: CPT

## 2020-02-28 PROCEDURE — 85651 RBC SED RATE NONAUTOMATED: CPT

## 2020-02-28 PROCEDURE — 85025 COMPLETE CBC W/AUTO DIFF WBC: CPT

## 2020-02-28 PROCEDURE — 80053 COMPREHEN METABOLIC PANEL: CPT

## 2020-02-28 RX ORDER — SODIUM CHLORIDE 0.9 % (FLUSH) 0.9 %
10 SYRINGE (ML) INJECTION PRN
Status: CANCELLED
Start: 2020-02-28

## 2020-02-28 RX ORDER — HEPARIN SODIUM (PORCINE) LOCK FLUSH IV SOLN 100 UNIT/ML 100 UNIT/ML
300 SOLUTION INTRAVENOUS PRN
Status: CANCELLED
Start: 2020-02-28

## 2020-03-03 ENCOUNTER — HOSPITAL ENCOUNTER (OUTPATIENT)
Dept: INFUSION THERAPY | Age: 65
Setting detail: INFUSION SERIES
Discharge: HOME | End: 2020-03-03
Payer: MEDICARE

## 2020-03-19 LAB
AFB CULTURE (MYCOBACTERIA): ABNORMAL
AFB CULTURE (MYCOBACTERIA): ABNORMAL
AFB SMEAR: ABNORMAL
AFB SMEAR: ABNORMAL
ORGANISM: ABNORMAL
ORGANISM: ABNORMAL

## 2020-03-20 LAB
Lab: NORMAL
REPORT: NORMAL
THIS TEST SENT TO: NORMAL

## 2020-04-30 ENCOUNTER — HOSPITAL ENCOUNTER (OUTPATIENT)
Age: 65
Discharge: HOME | End: 2020-04-30
Payer: MEDICARE

## 2020-04-30 LAB
ALBUMIN SERPL-MCNC: 4.1 G/DL (ref 3.5–5.2)
ALP BLD-CCNC: 64 U/L (ref 35–104)
ALT SERPL-CCNC: 10 U/L (ref 0–32)
ANION GAP SERPL CALCULATED.3IONS-SCNC: 9 MMOL/L (ref 7–16)
AST SERPL-CCNC: 19 U/L (ref 0–31)
BASOPHILS ABSOLUTE: 0.05 E9/L (ref 0–0.2)
BASOPHILS RELATIVE PERCENT: 1 % (ref 0–2)
BILIRUB SERPL-MCNC: <0.2 MG/DL (ref 0–1.2)
BUN BLDV-MCNC: 20 MG/DL (ref 8–23)
C-REACTIVE PROTEIN: 0.4 MG/DL (ref 0–0.4)
CALCIUM SERPL-MCNC: 9.7 MG/DL (ref 8.6–10.2)
CHLORIDE BLD-SCNC: 102 MMOL/L (ref 98–107)
CO2: 30 MMOL/L (ref 22–29)
CREAT SERPL-MCNC: 1.1 MG/DL (ref 0.5–1)
EOSINOPHILS ABSOLUTE: 0.34 E9/L (ref 0.05–0.5)
EOSINOPHILS RELATIVE PERCENT: 7 % (ref 0–6)
GFR AFRICAN AMERICAN: >60
GFR NON-AFRICAN AMERICAN: 50 ML/MIN/1.73
GLUCOSE BLD-MCNC: 84 MG/DL (ref 74–99)
HCT VFR BLD CALC: 38.9 % (ref 34–48)
HEMOGLOBIN: 12.1 G/DL (ref 11.5–15.5)
IMMATURE GRANULOCYTES #: 0.02 E9/L
IMMATURE GRANULOCYTES %: 0.4 % (ref 0–5)
LYMPHOCYTES ABSOLUTE: 1.1 E9/L (ref 1.5–4)
LYMPHOCYTES RELATIVE PERCENT: 22.7 % (ref 20–42)
MCH RBC QN AUTO: 28.7 PG (ref 26–35)
MCHC RBC AUTO-ENTMCNC: 31.1 % (ref 32–34.5)
MCV RBC AUTO: 92.4 FL (ref 80–99.9)
MONOCYTES ABSOLUTE: 0.55 E9/L (ref 0.1–0.95)
MONOCYTES RELATIVE PERCENT: 11.3 % (ref 2–12)
NEUTROPHILS ABSOLUTE: 2.79 E9/L (ref 1.8–7.3)
NEUTROPHILS RELATIVE PERCENT: 57.6 % (ref 43–80)
PDW BLD-RTO: 14.6 FL (ref 11.5–15)
PLATELET # BLD: 271 E9/L (ref 130–450)
PMV BLD AUTO: 8.4 FL (ref 7–12)
POTASSIUM SERPL-SCNC: 5.3 MMOL/L (ref 3.5–5)
RBC # BLD: 4.21 E12/L (ref 3.5–5.5)
SEDIMENTATION RATE, ERYTHROCYTE: 55 MM/HR (ref 0–20)
SODIUM BLD-SCNC: 141 MMOL/L (ref 132–146)
TOTAL PROTEIN: 7.2 G/DL (ref 6.4–8.3)
WBC # BLD: 4.9 E9/L (ref 4.5–11.5)

## 2020-04-30 PROCEDURE — 85651 RBC SED RATE NONAUTOMATED: CPT

## 2020-04-30 PROCEDURE — 36415 COLL VENOUS BLD VENIPUNCTURE: CPT

## 2020-04-30 PROCEDURE — 80053 COMPREHEN METABOLIC PANEL: CPT

## 2020-04-30 PROCEDURE — 86140 C-REACTIVE PROTEIN: CPT

## 2020-04-30 PROCEDURE — 85025 COMPLETE CBC W/AUTO DIFF WBC: CPT

## 2020-05-20 ENCOUNTER — HOSPITAL ENCOUNTER (INPATIENT)
Age: 65
LOS: 10 days | Discharge: HOME | DRG: 177 | End: 2020-05-30
Attending: EMERGENCY MEDICINE | Admitting: INTERNAL MEDICINE
Payer: MEDICARE

## 2020-05-20 ENCOUNTER — APPOINTMENT (OUTPATIENT)
Dept: CT IMAGING | Age: 65
DRG: 177 | End: 2020-05-20
Payer: MEDICARE

## 2020-05-20 PROBLEM — R11.2 INTRACTABLE NAUSEA AND VOMITING: Status: ACTIVE | Noted: 2020-05-20

## 2020-05-20 PROBLEM — N17.9 ACUTE RENAL FAILURE (ARF) (HCC): Status: ACTIVE | Noted: 2020-05-20

## 2020-05-20 LAB
ALBUMIN SERPL-MCNC: 3.3 G/DL (ref 3.5–5.2)
ALP BLD-CCNC: 75 U/L (ref 35–104)
ALT SERPL-CCNC: 33 U/L (ref 0–32)
ANION GAP SERPL CALCULATED.3IONS-SCNC: 17 MMOL/L (ref 7–16)
AST SERPL-CCNC: 26 U/L (ref 0–31)
BASOPHILS ABSOLUTE: 0.1 E9/L (ref 0–0.2)
BASOPHILS RELATIVE PERCENT: 0.6 % (ref 0–2)
BILIRUB SERPL-MCNC: 0.2 MG/DL (ref 0–1.2)
BUN BLDV-MCNC: 132 MG/DL (ref 8–23)
CALCIUM SERPL-MCNC: 8.4 MG/DL (ref 8.6–10.2)
CHLORIDE BLD-SCNC: 94 MMOL/L (ref 98–107)
CHLORIDE URINE RANDOM: 57 MMOL/L
CO2: 18 MMOL/L (ref 22–29)
CREAT SERPL-MCNC: 12.8 MG/DL (ref 0.5–1)
CREATININE URINE: 46 MG/DL (ref 29–226)
EOSINOPHILS ABSOLUTE: 0.52 E9/L (ref 0.05–0.5)
EOSINOPHILS RELATIVE PERCENT: 3 % (ref 0–6)
GFR AFRICAN AMERICAN: 4
GFR NON-AFRICAN AMERICAN: 3 ML/MIN/1.73
GLUCOSE BLD-MCNC: 87 MG/DL (ref 74–99)
HCT VFR BLD CALC: 38.5 % (ref 34–48)
HEMOGLOBIN: 12.4 G/DL (ref 11.5–15.5)
IMMATURE GRANULOCYTES #: 0.83 E9/L
IMMATURE GRANULOCYTES %: 4.7 % (ref 0–5)
LACTIC ACID, SEPSIS: 0.8 MMOL/L (ref 0.5–1.9)
LIPASE: 97 U/L (ref 13–60)
LYMPHOCYTES ABSOLUTE: 1.93 E9/L (ref 1.5–4)
LYMPHOCYTES RELATIVE PERCENT: 11 % (ref 20–42)
MCH RBC QN AUTO: 28.1 PG (ref 26–35)
MCHC RBC AUTO-ENTMCNC: 32.2 % (ref 32–34.5)
MCV RBC AUTO: 87.1 FL (ref 80–99.9)
MONOCYTES ABSOLUTE: 1.14 E9/L (ref 0.1–0.95)
MONOCYTES RELATIVE PERCENT: 6.5 % (ref 2–12)
NEUTROPHILS ABSOLUTE: 13.1 E9/L (ref 1.8–7.3)
NEUTROPHILS RELATIVE PERCENT: 74.2 % (ref 43–80)
PDW BLD-RTO: 14.5 FL (ref 11.5–15)
PLATELET # BLD: 137 E9/L (ref 130–450)
PMV BLD AUTO: 10.8 FL (ref 7–12)
POTASSIUM SERPL-SCNC: 5.8 MMOL/L (ref 3.5–5)
PROTEIN PROTEIN: 28 MG/DL (ref 0–12)
PROTEIN/CREAT RATIO: 0.6
PROTEIN/CREAT RATIO: 0.6 (ref 0–0.2)
RBC # BLD: 4.42 E12/L (ref 3.5–5.5)
SODIUM BLD-SCNC: 129 MMOL/L (ref 132–146)
SODIUM URINE: 70 MMOL/L
TOTAL CK: 22 U/L (ref 20–180)
TOTAL PROTEIN: 6.6 G/DL (ref 6.4–8.3)
WBC # BLD: 17.6 E9/L (ref 4.5–11.5)

## 2020-05-20 PROCEDURE — 0100U HC RESPIRPTHGN MULT REV TRANS & AMP PRB TECH 21 TRGT: CPT

## 2020-05-20 PROCEDURE — 2060000000 HC ICU INTERMEDIATE R&B

## 2020-05-20 PROCEDURE — 84300 ASSAY OF URINE SODIUM: CPT

## 2020-05-20 PROCEDURE — 74176 CT ABD & PELVIS W/O CONTRAST: CPT

## 2020-05-20 PROCEDURE — 2580000003 HC RX 258: Performed by: EMERGENCY MEDICINE

## 2020-05-20 PROCEDURE — 51702 INSERT TEMP BLADDER CATH: CPT

## 2020-05-20 PROCEDURE — 83605 ASSAY OF LACTIC ACID: CPT

## 2020-05-20 PROCEDURE — 6360000002 HC RX W HCPCS: Performed by: NURSE PRACTITIONER

## 2020-05-20 PROCEDURE — 84156 ASSAY OF PROTEIN URINE: CPT

## 2020-05-20 PROCEDURE — 96374 THER/PROPH/DIAG INJ IV PUSH: CPT

## 2020-05-20 PROCEDURE — 6360000002 HC RX W HCPCS: Performed by: EMERGENCY MEDICINE

## 2020-05-20 PROCEDURE — 82550 ASSAY OF CK (CPK): CPT

## 2020-05-20 PROCEDURE — APPSS45 APP SPLIT SHARED TIME 31-45 MINUTES: Performed by: NURSE PRACTITIONER

## 2020-05-20 PROCEDURE — 99285 EMERGENCY DEPT VISIT HI MDM: CPT

## 2020-05-20 PROCEDURE — 83690 ASSAY OF LIPASE: CPT

## 2020-05-20 PROCEDURE — 6370000000 HC RX 637 (ALT 250 FOR IP): Performed by: NURSE PRACTITIONER

## 2020-05-20 PROCEDURE — 82570 ASSAY OF URINE CREATININE: CPT

## 2020-05-20 PROCEDURE — 82436 ASSAY OF URINE CHLORIDE: CPT

## 2020-05-20 PROCEDURE — 80053 COMPREHEN METABOLIC PANEL: CPT

## 2020-05-20 PROCEDURE — 99223 1ST HOSP IP/OBS HIGH 75: CPT | Performed by: INTERNAL MEDICINE

## 2020-05-20 PROCEDURE — 83935 ASSAY OF URINE OSMOLALITY: CPT

## 2020-05-20 PROCEDURE — 93005 ELECTROCARDIOGRAM TRACING: CPT | Performed by: EMERGENCY MEDICINE

## 2020-05-20 PROCEDURE — 2580000003 HC RX 258: Performed by: NURSE PRACTITIONER

## 2020-05-20 PROCEDURE — 85025 COMPLETE CBC W/AUTO DIFF WBC: CPT

## 2020-05-20 PROCEDURE — 96375 TX/PRO/DX INJ NEW DRUG ADDON: CPT

## 2020-05-20 PROCEDURE — C9113 INJ PANTOPRAZOLE SODIUM, VIA: HCPCS | Performed by: EMERGENCY MEDICINE

## 2020-05-20 RX ORDER — SODIUM CHLORIDE 9 MG/ML
INJECTION, SOLUTION INTRAVENOUS CONTINUOUS
Status: DISCONTINUED | OUTPATIENT
Start: 2020-05-20 | End: 2020-05-22

## 2020-05-20 RX ORDER — ONDANSETRON 2 MG/ML
4 INJECTION INTRAMUSCULAR; INTRAVENOUS EVERY 6 HOURS PRN
Status: DISCONTINUED | OUTPATIENT
Start: 2020-05-20 | End: 2020-05-30 | Stop reason: HOSPADM

## 2020-05-20 RX ORDER — PANTOPRAZOLE SODIUM 40 MG/10ML
40 INJECTION, POWDER, LYOPHILIZED, FOR SOLUTION INTRAVENOUS ONCE
Status: COMPLETED | OUTPATIENT
Start: 2020-05-20 | End: 2020-05-20

## 2020-05-20 RX ORDER — ACETAMINOPHEN 650 MG/1
650 SUPPOSITORY RECTAL EVERY 6 HOURS PRN
Status: DISCONTINUED | OUTPATIENT
Start: 2020-05-20 | End: 2020-05-30 | Stop reason: HOSPADM

## 2020-05-20 RX ORDER — ONDANSETRON 2 MG/ML
4 INJECTION INTRAMUSCULAR; INTRAVENOUS ONCE
Status: COMPLETED | OUTPATIENT
Start: 2020-05-20 | End: 2020-05-20

## 2020-05-20 RX ORDER — PROMETHAZINE HYDROCHLORIDE 25 MG/1
12.5 TABLET ORAL EVERY 6 HOURS PRN
Status: DISCONTINUED | OUTPATIENT
Start: 2020-05-20 | End: 2020-05-30 | Stop reason: HOSPADM

## 2020-05-20 RX ORDER — VILAZODONE HYDROCHLORIDE 40 MG/1
40 TABLET ORAL DAILY
COMMUNITY

## 2020-05-20 RX ORDER — SODIUM CHLORIDE 0.9 % (FLUSH) 0.9 %
10 SYRINGE (ML) INJECTION PRN
Status: DISCONTINUED | OUTPATIENT
Start: 2020-05-20 | End: 2020-05-30 | Stop reason: HOSPADM

## 2020-05-20 RX ORDER — SODIUM CHLORIDE 0.9 % (FLUSH) 0.9 %
10 SYRINGE (ML) INJECTION EVERY 12 HOURS SCHEDULED
Status: DISCONTINUED | OUTPATIENT
Start: 2020-05-20 | End: 2020-05-30 | Stop reason: HOSPADM

## 2020-05-20 RX ORDER — 0.9 % SODIUM CHLORIDE 0.9 %
1000 INTRAVENOUS SOLUTION INTRAVENOUS ONCE
Status: COMPLETED | OUTPATIENT
Start: 2020-05-20 | End: 2020-05-20

## 2020-05-20 RX ORDER — POLYETHYLENE GLYCOL 3350 17 G/17G
17 POWDER, FOR SOLUTION ORAL DAILY PRN
Status: DISCONTINUED | OUTPATIENT
Start: 2020-05-20 | End: 2020-05-30 | Stop reason: HOSPADM

## 2020-05-20 RX ORDER — SODIUM CHLORIDE 9 MG/ML
1000 INJECTION, SOLUTION INTRAVENOUS CONTINUOUS
Status: DISCONTINUED | OUTPATIENT
Start: 2020-05-20 | End: 2020-05-20

## 2020-05-20 RX ORDER — ACETAMINOPHEN 325 MG/1
650 TABLET ORAL EVERY 6 HOURS PRN
Status: DISCONTINUED | OUTPATIENT
Start: 2020-05-20 | End: 2020-05-30 | Stop reason: HOSPADM

## 2020-05-20 RX ORDER — HEPARIN SODIUM 10000 [USP'U]/ML
5000 INJECTION, SOLUTION INTRAVENOUS; SUBCUTANEOUS EVERY 8 HOURS SCHEDULED
Status: DISCONTINUED | OUTPATIENT
Start: 2020-05-20 | End: 2020-05-30 | Stop reason: HOSPADM

## 2020-05-20 RX ORDER — PANTOPRAZOLE SODIUM 40 MG/10ML
40 INJECTION, POWDER, LYOPHILIZED, FOR SOLUTION INTRAVENOUS DAILY
Status: DISCONTINUED | OUTPATIENT
Start: 2020-05-21 | End: 2020-05-30 | Stop reason: HOSPADM

## 2020-05-20 RX ORDER — CALCIUM GLUCONATE 94 MG/ML
1 INJECTION, SOLUTION INTRAVENOUS ONCE
Status: COMPLETED | OUTPATIENT
Start: 2020-05-20 | End: 2020-05-20

## 2020-05-20 RX ORDER — FENTANYL CITRATE 50 UG/ML
50 INJECTION, SOLUTION INTRAMUSCULAR; INTRAVENOUS ONCE
Status: COMPLETED | OUTPATIENT
Start: 2020-05-20 | End: 2020-05-20

## 2020-05-20 RX ADMIN — SODIUM CHLORIDE: 9 INJECTION, SOLUTION INTRAVENOUS at 20:47

## 2020-05-20 RX ADMIN — ACETAMINOPHEN 650 MG: 325 TABLET ORAL at 20:51

## 2020-05-20 RX ADMIN — SODIUM CHLORIDE 1000 ML: 9 INJECTION, SOLUTION INTRAVENOUS at 15:20

## 2020-05-20 RX ADMIN — ONDANSETRON 4 MG: 2 INJECTION INTRAMUSCULAR; INTRAVENOUS at 22:06

## 2020-05-20 RX ADMIN — CALCIUM GLUCONATE 1 G: 98 INJECTION, SOLUTION INTRAVENOUS at 17:26

## 2020-05-20 RX ADMIN — HEPARIN SODIUM 5000 UNITS: 10000 INJECTION, SOLUTION INTRAVENOUS; SUBCUTANEOUS at 22:06

## 2020-05-20 RX ADMIN — FENTANYL CITRATE 50 MCG: 50 INJECTION, SOLUTION INTRAMUSCULAR; INTRAVENOUS at 17:27

## 2020-05-20 RX ADMIN — ONDANSETRON 4 MG: 2 INJECTION INTRAMUSCULAR; INTRAVENOUS at 15:20

## 2020-05-20 RX ADMIN — SODIUM CHLORIDE 1000 ML: 9 INJECTION, SOLUTION INTRAVENOUS at 17:26

## 2020-05-20 RX ADMIN — PANTOPRAZOLE SODIUM 40 MG: 40 INJECTION, POWDER, FOR SOLUTION INTRAVENOUS at 17:27

## 2020-05-20 ASSESSMENT — PAIN SCALES - GENERAL
PAINLEVEL_OUTOF10: 9
PAINLEVEL_OUTOF10: 5
PAINLEVEL_OUTOF10: 5

## 2020-05-20 ASSESSMENT — ENCOUNTER SYMPTOMS
DIARRHEA: 0
SINUS PRESSURE: 0
NAUSEA: 1
ABDOMINAL DISTENTION: 0
BACK PAIN: 0
SORE THROAT: 0
WHEEZING: 0
EYE REDNESS: 0
ABDOMINAL PAIN: 1
EYE PAIN: 0
VOMITING: 1
EYE DISCHARGE: 0
COUGH: 0
SHORTNESS OF BREATH: 0

## 2020-05-20 ASSESSMENT — PAIN DESCRIPTION - LOCATION: LOCATION: HEAD

## 2020-05-20 NOTE — ED NOTES
Unable to insert 16fr randall at this time, unable to pass through urethra. Informed Dr. Ale Camacho at this time and she instructed to do the smallest randall that will fit.       Alan Doss RN  05/20/20 1925

## 2020-05-20 NOTE — ED PROVIDER NOTES
Normal heart sounds. No murmur. Pulmonary:      Effort: Pulmonary effort is normal. No respiratory distress. Breath sounds: Normal breath sounds. No wheezing or rales. Abdominal:      General: Bowel sounds are normal. There is no distension. Palpations: Abdomen is soft. Tenderness: There is no abdominal tenderness. There is no guarding or rebound. Skin:     General: Skin is warm and dry. Neurological:      Mental Status: She is alert and oriented to person, place, and time. Cranial Nerves: No cranial nerve deficit. Coordination: Coordination normal.          Procedures     MDM       --------------------------------------------- PAST HISTORY ---------------------------------------------  Past Medical History:  has a past medical history of Bronchiectasis ([address] COPD (chronic obstructive pulmonary disease) ([address] and Mycobacterium avium complex ([address] Past Surgical History:  has a past surgical history that includes bronchoscopy (N/A, 12/30/2019). Social History:  reports that she quit smoking about 22 years ago. Her smoking use included cigarettes. She started smoking about 43 years ago. She has a 20.00 pack-year smoking history. She has never used smokeless tobacco. She reports that she does not drink alcohol or use drugs. Family History: family history includes No Known Problems in her father and mother. The patients home medications have been reviewed. Allergies: Patient has no known allergies.     -------------------------------------------------- RESULTS -------------------------------------------------  Labs:  Results for orders placed or performed during the hospital encounter of 05/20/20   Respiratory Panel, Molecular   Result Value Ref Range    Adenovirus by PCR Not Detected Not Detected    Bordetella parapertussis by PCR Not Detected Not Detected    Bordetella pertussis by PCR Not Detected Not Detected    Chlamydophilia pneumoniae by PCR Not Detected Not ---------------------------------    IMPRESSION  1. Acute renal failure, unspecified acute renal failure type ([facility]        DISPOSITION  Disposition: Admit to telemetry  Patient condition is stable    Current Discharge Medication List          NOTE: This report was transcribed using voice recognition software.  Every effort was made to ensure accuracy; however, inadvertent computerized transcription errors may be present           Esvin Lloyd DO  Resident  05/21/20 6065

## 2020-05-20 NOTE — ED NOTES
10fr randall inserted at this time without difficulty, slight urine return noted.       Brigida Key RN  05/20/20 1926

## 2020-05-21 ENCOUNTER — APPOINTMENT (OUTPATIENT)
Dept: GENERAL RADIOLOGY | Age: 65
DRG: 177 | End: 2020-05-21
Payer: MEDICARE

## 2020-05-21 LAB
ADENOVIRUS BY PCR: NOT DETECTED
ALBUMIN SERPL-MCNC: 3.1 G/DL (ref 3.5–5.2)
ALP BLD-CCNC: 88 U/L (ref 35–104)
ALT SERPL-CCNC: 27 U/L (ref 0–32)
ANION GAP SERPL CALCULATED.3IONS-SCNC: 16 MMOL/L (ref 7–16)
ANION GAP SERPL CALCULATED.3IONS-SCNC: 16 MMOL/L (ref 7–16)
ANION GAP SERPL CALCULATED.3IONS-SCNC: 17 MMOL/L (ref 7–16)
AST SERPL-CCNC: 20 U/L (ref 0–31)
BILIRUB SERPL-MCNC: 0.3 MG/DL (ref 0–1.2)
BORDETELLA PARAPERTUSSIS BY PCR: NOT DETECTED
BORDETELLA PERTUSSIS BY PCR: NOT DETECTED
BUN BLDV-MCNC: 119 MG/DL (ref 8–23)
BUN BLDV-MCNC: 122 MG/DL (ref 8–23)
BUN BLDV-MCNC: 131 MG/DL (ref 8–23)
CALCIUM SERPL-MCNC: 8.2 MG/DL (ref 8.6–10.2)
CALCIUM SERPL-MCNC: 8.3 MG/DL (ref 8.6–10.2)
CALCIUM SERPL-MCNC: 8.7 MG/DL (ref 8.6–10.2)
CHLAMYDOPHILIA PNEUMONIAE BY PCR: NOT DETECTED
CHLORIDE BLD-SCNC: 100 MMOL/L (ref 98–107)
CHLORIDE BLD-SCNC: 101 MMOL/L (ref 98–107)
CHLORIDE BLD-SCNC: 98 MMOL/L (ref 98–107)
CO2: 16 MMOL/L (ref 22–29)
CO2: 16 MMOL/L (ref 22–29)
CO2: 18 MMOL/L (ref 22–29)
CORONAVIRUS 229E BY PCR: NOT DETECTED
CORONAVIRUS HKU1 BY PCR: NOT DETECTED
CORONAVIRUS NL63 BY PCR: NOT DETECTED
CORONAVIRUS OC43 BY PCR: NOT DETECTED
CREAT SERPL-MCNC: 12.4 MG/DL (ref 0.5–1)
CREAT SERPL-MCNC: 12.6 MG/DL (ref 0.5–1)
CREAT SERPL-MCNC: 12.9 MG/DL (ref 0.5–1)
EKG ATRIAL RATE: 71 BPM
EKG ATRIAL RATE: 80 BPM
EKG P AXIS: 64 DEGREES
EKG P AXIS: 64 DEGREES
EKG P-R INTERVAL: 146 MS
EKG P-R INTERVAL: 148 MS
EKG Q-T INTERVAL: 398 MS
EKG Q-T INTERVAL: 406 MS
EKG QRS DURATION: 134 MS
EKG QRS DURATION: 134 MS
EKG QTC CALCULATION (BAZETT): 441 MS
EKG QTC CALCULATION (BAZETT): 459 MS
EKG R AXIS: 22 DEGREES
EKG R AXIS: 24 DEGREES
EKG T AXIS: 116 DEGREES
EKG T AXIS: 135 DEGREES
EKG VENTRICULAR RATE: 71 BPM
EKG VENTRICULAR RATE: 80 BPM
EOSINOPHIL, URINE: 0 % (ref 0–1)
FERRITIN: 200 NG/ML
GFR AFRICAN AMERICAN: 4
GFR NON-AFRICAN AMERICAN: 3 ML/MIN/1.73
GLUCOSE BLD-MCNC: 109 MG/DL (ref 74–99)
GLUCOSE BLD-MCNC: 146 MG/DL (ref 74–99)
GLUCOSE BLD-MCNC: 86 MG/DL (ref 74–99)
HCT VFR BLD CALC: 39.1 % (ref 34–48)
HEMOGLOBIN: 12.5 G/DL (ref 11.5–15.5)
HUMAN METAPNEUMOVIRUS BY PCR: NOT DETECTED
HUMAN RHINOVIRUS/ENTEROVIRUS BY PCR: NOT DETECTED
INFLUENZA A BY PCR: NOT DETECTED
INFLUENZA B BY PCR: NOT DETECTED
IRON SATURATION: 15 % (ref 15–50)
IRON: 37 MCG/DL (ref 37–145)
MCH RBC QN AUTO: 28.2 PG (ref 26–35)
MCHC RBC AUTO-ENTMCNC: 32 % (ref 32–34.5)
MCV RBC AUTO: 88.3 FL (ref 80–99.9)
METER GLUCOSE: 136 MG/DL (ref 74–99)
METER GLUCOSE: 202 MG/DL (ref 74–99)
MYCOPLASMA PNEUMONIAE BY PCR: NOT DETECTED
OSMOLALITY URINE: 280 MOSM/KG (ref 300–900)
PARAINFLUENZA VIRUS 1 BY PCR: NOT DETECTED
PARAINFLUENZA VIRUS 2 BY PCR: NOT DETECTED
PARAINFLUENZA VIRUS 3 BY PCR: NOT DETECTED
PARAINFLUENZA VIRUS 4 BY PCR: NOT DETECTED
PDW BLD-RTO: 14.6 FL (ref 11.5–15)
PLATELET # BLD: 155 E9/L (ref 130–450)
PMV BLD AUTO: 10.3 FL (ref 7–12)
POTASSIUM SERPL-SCNC: 5.2 MMOL/L (ref 3.5–5)
POTASSIUM SERPL-SCNC: 5.8 MMOL/L (ref 3.5–5)
POTASSIUM SERPL-SCNC: 6.1 MMOL/L (ref 3.5–5)
RBC # BLD: 4.43 E12/L (ref 3.5–5.5)
RESPIRATORY SYNCYTIAL VIRUS BY PCR: NOT DETECTED
SODIUM BLD-SCNC: 131 MMOL/L (ref 132–146)
SODIUM BLD-SCNC: 133 MMOL/L (ref 132–146)
SODIUM BLD-SCNC: 134 MMOL/L (ref 132–146)
TOTAL IRON BINDING CAPACITY: 245 MCG/DL (ref 250–450)
TOTAL PROTEIN: 5.8 G/DL (ref 6.4–8.3)
WBC # BLD: 14.6 E9/L (ref 4.5–11.5)

## 2020-05-21 PROCEDURE — 83540 ASSAY OF IRON: CPT

## 2020-05-21 PROCEDURE — 83550 IRON BINDING TEST: CPT

## 2020-05-21 PROCEDURE — C9113 INJ PANTOPRAZOLE SODIUM, VIA: HCPCS | Performed by: NURSE PRACTITIONER

## 2020-05-21 PROCEDURE — 76937 US GUIDE VASCULAR ACCESS: CPT

## 2020-05-21 PROCEDURE — 2500000003 HC RX 250 WO HCPCS: Performed by: INTERNAL MEDICINE

## 2020-05-21 PROCEDURE — 6370000000 HC RX 637 (ALT 250 FOR IP): Performed by: NURSE PRACTITIONER

## 2020-05-21 PROCEDURE — 36415 COLL VENOUS BLD VENIPUNCTURE: CPT

## 2020-05-21 PROCEDURE — 99223 1ST HOSP IP/OBS HIGH 75: CPT | Performed by: INTERNAL MEDICINE

## 2020-05-21 PROCEDURE — 87205 SMEAR GRAM STAIN: CPT

## 2020-05-21 PROCEDURE — 6370000000 HC RX 637 (ALT 250 FOR IP): Performed by: INTERNAL MEDICINE

## 2020-05-21 PROCEDURE — 2580000003 HC RX 258: Performed by: INTERNAL MEDICINE

## 2020-05-21 PROCEDURE — 02HV33Z INSERTION OF INFUSION DEVICE INTO SUPERIOR VENA CAVA, PERCUTANEOUS APPROACH: ICD-10-PCS | Performed by: INTERNAL MEDICINE

## 2020-05-21 PROCEDURE — 2060000000 HC ICU INTERMEDIATE R&B

## 2020-05-21 PROCEDURE — 93010 ELECTROCARDIOGRAM REPORT: CPT | Performed by: INTERNAL MEDICINE

## 2020-05-21 PROCEDURE — C1751 CATH, INF, PER/CENT/MIDLINE: HCPCS

## 2020-05-21 PROCEDURE — 6360000002 HC RX W HCPCS: Performed by: INTERNAL MEDICINE

## 2020-05-21 PROCEDURE — 6360000002 HC RX W HCPCS: Performed by: NURSE PRACTITIONER

## 2020-05-21 PROCEDURE — 80048 BASIC METABOLIC PNL TOTAL CA: CPT

## 2020-05-21 PROCEDURE — 82962 GLUCOSE BLOOD TEST: CPT

## 2020-05-21 PROCEDURE — 36569 INSJ PICC 5 YR+ W/O IMAGING: CPT

## 2020-05-21 PROCEDURE — 93005 ELECTROCARDIOGRAM TRACING: CPT | Performed by: INTERNAL MEDICINE

## 2020-05-21 PROCEDURE — 71045 X-RAY EXAM CHEST 1 VIEW: CPT

## 2020-05-21 PROCEDURE — 82728 ASSAY OF FERRITIN: CPT

## 2020-05-21 PROCEDURE — 2580000003 HC RX 258: Performed by: NURSE PRACTITIONER

## 2020-05-21 PROCEDURE — 99232 SBSQ HOSP IP/OBS MODERATE 35: CPT | Performed by: INTERNAL MEDICINE

## 2020-05-21 PROCEDURE — 85027 COMPLETE CBC AUTOMATED: CPT

## 2020-05-21 PROCEDURE — 80053 COMPREHEN METABOLIC PANEL: CPT

## 2020-05-21 RX ORDER — CALCIUM GLUCONATE 94 MG/ML
1 INJECTION, SOLUTION INTRAVENOUS ONCE
Status: COMPLETED | OUTPATIENT
Start: 2020-05-21 | End: 2020-05-21

## 2020-05-21 RX ORDER — CALCIUM GLUCONATE 94 MG/ML
1 INJECTION, SOLUTION INTRAVENOUS ONCE
Status: DISCONTINUED | OUTPATIENT
Start: 2020-05-21 | End: 2020-05-21 | Stop reason: SDUPTHER

## 2020-05-21 RX ORDER — LIDOCAINE HYDROCHLORIDE 10 MG/ML
5 INJECTION, SOLUTION EPIDURAL; INFILTRATION; INTRACAUDAL; PERINEURAL ONCE
Status: COMPLETED | OUTPATIENT
Start: 2020-05-21 | End: 2020-05-21

## 2020-05-21 RX ORDER — HEPARIN SODIUM (PORCINE) LOCK FLUSH IV SOLN 100 UNIT/ML 100 UNIT/ML
3 SOLUTION INTRAVENOUS EVERY 12 HOURS SCHEDULED
Status: DISCONTINUED | OUTPATIENT
Start: 2020-05-21 | End: 2020-05-30 | Stop reason: HOSPADM

## 2020-05-21 RX ORDER — SODIUM CHLORIDE 0.9 % (FLUSH) 0.9 %
10 SYRINGE (ML) INJECTION PRN
Status: DISCONTINUED | OUTPATIENT
Start: 2020-05-21 | End: 2020-05-30 | Stop reason: HOSPADM

## 2020-05-21 RX ORDER — DEXTROSE MONOHYDRATE 25 G/50ML
25 INJECTION, SOLUTION INTRAVENOUS ONCE
Status: COMPLETED | OUTPATIENT
Start: 2020-05-21 | End: 2020-05-21

## 2020-05-21 RX ORDER — SODIUM POLYSTYRENE SULFONATE 15 G/60ML
15 SUSPENSION ORAL; RECTAL ONCE
Status: COMPLETED | OUTPATIENT
Start: 2020-05-22 | End: 2020-05-22

## 2020-05-21 RX ORDER — HEPARIN SODIUM (PORCINE) LOCK FLUSH IV SOLN 100 UNIT/ML 100 UNIT/ML
3 SOLUTION INTRAVENOUS PRN
Status: DISCONTINUED | OUTPATIENT
Start: 2020-05-21 | End: 2020-05-30 | Stop reason: HOSPADM

## 2020-05-21 RX ORDER — SODIUM POLYSTYRENE SULFONATE 15 G/60ML
15 SUSPENSION ORAL; RECTAL ONCE
Status: COMPLETED | OUTPATIENT
Start: 2020-05-21 | End: 2020-05-21

## 2020-05-21 RX ADMIN — TRIMETHOBENZAMIDE HYDROCHLORIDE 200 MG: 100 INJECTION INTRAMUSCULAR at 09:39

## 2020-05-21 RX ADMIN — HEPARIN SODIUM 5000 UNITS: 10000 INJECTION, SOLUTION INTRAVENOUS; SUBCUTANEOUS at 06:04

## 2020-05-21 RX ADMIN — LIDOCAINE HYDROCHLORIDE 1.5 ML: 10 INJECTION, SOLUTION EPIDURAL; INFILTRATION; INTRACAUDAL; PERINEURAL at 16:45

## 2020-05-21 RX ADMIN — ONDANSETRON 4 MG: 2 INJECTION INTRAMUSCULAR; INTRAVENOUS at 04:18

## 2020-05-21 RX ADMIN — Medication 10 ML: at 21:47

## 2020-05-21 RX ADMIN — INSULIN HUMAN 10 UNITS: 100 INJECTION, SOLUTION PARENTERAL at 08:03

## 2020-05-21 RX ADMIN — HEPARIN SODIUM 5000 UNITS: 10000 INJECTION, SOLUTION INTRAVENOUS; SUBCUTANEOUS at 13:16

## 2020-05-21 RX ADMIN — DEXTROSE MONOHYDRATE 25 G: 25 INJECTION, SOLUTION INTRAVENOUS at 21:52

## 2020-05-21 RX ADMIN — ACETAMINOPHEN 650 MG: 325 TABLET ORAL at 06:06

## 2020-05-21 RX ADMIN — SODIUM CHLORIDE: 9 INJECTION, SOLUTION INTRAVENOUS at 22:49

## 2020-05-21 RX ADMIN — SODIUM POLYSTYRENE SULFONATE 15 G: 15 SUSPENSION ORAL; RECTAL at 21:28

## 2020-05-21 RX ADMIN — SODIUM BICARBONATE 50 MEQ: 84 INJECTION, SOLUTION INTRAVENOUS at 21:29

## 2020-05-21 RX ADMIN — SODIUM BICARBONATE 50 MEQ: 84 INJECTION, SOLUTION INTRAVENOUS at 08:03

## 2020-05-21 RX ADMIN — Medication 10 ML: at 08:04

## 2020-05-21 RX ADMIN — SODIUM CHLORIDE, PRESERVATIVE FREE 300 UNITS: 5 INJECTION INTRAVENOUS at 21:29

## 2020-05-21 RX ADMIN — CALCIUM GLUCONATE 1 G: 98 INJECTION, SOLUTION INTRAVENOUS at 21:28

## 2020-05-21 RX ADMIN — DEXTROSE MONOHYDRATE 25 G: 25 INJECTION, SOLUTION INTRAVENOUS at 08:03

## 2020-05-21 RX ADMIN — CALCIUM GLUCONATE 1 G: 98 INJECTION, SOLUTION INTRAVENOUS at 08:03

## 2020-05-21 RX ADMIN — INSULIN HUMAN 10 UNITS: 100 INJECTION, SOLUTION PARENTERAL at 21:29

## 2020-05-21 RX ADMIN — ACETAMINOPHEN 650 MG: 325 TABLET ORAL at 12:59

## 2020-05-21 RX ADMIN — PANTOPRAZOLE SODIUM 40 MG: 40 INJECTION, POWDER, FOR SOLUTION INTRAVENOUS at 08:03

## 2020-05-21 RX ADMIN — HEPARIN SODIUM 5000 UNITS: 10000 INJECTION, SOLUTION INTRAVENOUS; SUBCUTANEOUS at 21:26

## 2020-05-21 RX ADMIN — ACETAMINOPHEN 650 MG: 325 TABLET ORAL at 23:06

## 2020-05-21 ASSESSMENT — PAIN SCALES - GENERAL
PAINLEVEL_OUTOF10: 3
PAINLEVEL_OUTOF10: 5
PAINLEVEL_OUTOF10: 2
PAINLEVEL_OUTOF10: 0
PAINLEVEL_OUTOF10: 9
PAINLEVEL_OUTOF10: 9

## 2020-05-21 NOTE — CONSULTS
Pulmonary [facility]                             Pulmonary Consult/Progress Note :          Patient: Constantino Valencia  MRN: [id number]  : 1955      Date of Admission: .2020  2:32 PM    Consulting Physician:Liyah GONZALEZ        Reason for Consultation:history of MAC ,  CC :   HPI:   Constantino Valencia is a 60y.o. year old known h/o resistant MAC admitted with MATTEO        The patient was treated for MAC by a pulmonologist in [place] in 2016 with Clarithromycin, Ethambutol and Rifampin for approximately 2 years. She has had recurrent episodes of respiratory tract infections and has been tested for immune deficiencies but not found to have cellular or humoral immune deficiency. She was admitted to the hospital in 2019 and had a bronchoscopy. she was positive again for MAC ,also She had tested positive for RSV ,Her MAC susceptible to Amikacin, Clarithromycin, Ethambutol, Moxifloxacin, Rifabutin, rifampin, intermediate to Linezolid.   IV Amikacin was prescribed but she declined due to cost. So changed to  inhaled amikacin (Arycase) in 2020,which she did not start.      She later started on Ethambutol and Rifa ,she later stopped and she came to ER on 2020 as she was complaining of feeling sick  and nausea and vomiting and she was in renal failure       PAST MEDICAL HISTORY:   Past Medical History:   Diagnosis Date    Bronchiectasis ([address]     COPD (chronic obstructive pulmonary disease) [facility]     Mycobacterium avium complex ([facility]        PAST SURGICAL HISTORY:   Past Surgical History:   Procedure Laterality Date    BRONCHOSCOPY N/A 2019    BRONCHOSCOPY ALVEOLAR LAVAGE performed by Chas Miranda MD at [facility] ENDOSCOPY       FAMILY HISTORY:   Family History   Problem Relation Age of Onset    No Known Problems Mother     No Known Problems Father        SOCIAL HISTORY:   Social History     Socioeconomic History    Marital status:  lymphoadenopathy,no nasal stuffiness     Hematological and Lymphatic ROS:   No ecchymosis ,no tendency to bleed  Respiratory ROS:   SOB ,cough   Cardiovascular ROS:   No CP,No Palpitation   Gastrointestinal ROS:   No Gi bleed,no nausea or vomiting      - Musculoskeletal ROS:      - no joint swelling ,no joint pain   Neurological ROS:     -no weakness or numbness    Dermatological ROS:   No skin rash ,no urticaria     PHYSICAL EXAMINATION:     VITAL SIGNS:  BP (!) 118/54   Pulse 66   Temp 98.1 °F (36.7 °C) (Temporal)   Resp 18   Ht 5' 2\" (1.575 m)   Wt 118 lb (53.5 kg)   SpO2 95%   BMI 21.58 kg/m²   Wt Readings from Last 3 Encounters:   05/21/20 118 lb (53.5 kg)   02/17/20 109 lb (49.4 kg)   01/01/20 106 lb 3.2 oz (48.2 kg)     Temp Readings from Last 3 Encounters:   05/21/20 98.1 °F (36.7 °C) (Temporal)   04/30/20 97.6 °F (36.4 °C) (Oral)   03/19/20 97.5 °F (36.4 °C)     TMAX:  BP Readings from Last 3 Encounters:   05/21/20 (!) 118/54   02/27/20 104/60   02/17/20 (!) 101/53     Pulse Readings from Last 3 Encounters:   05/21/20 66   02/27/20 84   02/17/20 69           INTAKE/OUTPUTS:  I/O last 3 completed shifts: In: 2749.9 [I.V.:1749.9;  IV Piggyback:1000]  Out: 600 [Urine:600]    Intake/Output Summary (Last 24 hours) at 5/21/2020 1530  Last data filed at 5/21/2020 1303  Gross per 24 hour   Intake 2749.87 ml   Output 600 ml   Net 2149.87 ml       General Appearance: alert and oriented to person, place and time, well-developed and   well-nourished, in no acute distress   Eyes: pupils equal, round, and reactive to light, extraocular eye movements intact, conjunctivae normal and sclera anicteric   Neck: neck supple and non tender without mass, no thyromegaly, no thyroid nodules and no cervical adenopathy   Pulmonary/Chest:Rhonchi   Cardiovascular: normal rate, regular rhythm, normal S1 and S2, no murmurs, rubs, clicks or gallops, distal pulses intact, no carotid bruits, no murmurs, no gallops, no carotid bruits

## 2020-05-21 NOTE — PROGRESS NOTES
[address] [place], [place] [address]ist   Progress Note    Admitting Date and Time: 5/20/2020  2:32 PM  Admit Dx: Acute renal failure (ARF) ([address] [N17.9]  Acute renal failure (ARF) (HCC) [N17.9]    Subjective:    Patient seen and examined at the bedside. She is in moderate distress due to nausea. Still feels fatigued. 24 hr events:   - Vitals stable. -  cc o/n. - K remains elevated; nephrology aware. Bicarb, insulin, d50 ordered. ROS: denies fever, chills, cp, sob, n/v, HA unless stated above.  sodium chloride flush  10 mL Intravenous 2 times per day    heparin (porcine)  5,000 Units Subcutaneous 3 times per day    pantoprazole  40 mg Intravenous Daily     trimethobenzamide, 200 mg, Q6H PRN  sodium chloride flush, 10 mL, PRN  acetaminophen, 650 mg, Q6H PRN    Or  acetaminophen, 650 mg, Q6H PRN  polyethylene glycol, 17 g, Daily PRN  promethazine, 12.5 mg, Q6H PRN    Or  ondansetron, 4 mg, Q6H PRN         Objective:    BP (!) 118/54   Pulse 66   Temp 98.1 °F (36.7 °C) (Temporal)   Resp 18   Ht 5' 2\" (1.575 m)   Wt 118 lb (53.5 kg)   SpO2 95%   BMI 21.58 kg/m²   General Appearance: alert and oriented to person, place and time and in mild to moderate distress due to nausea and fatigue. Weak.    Skin: warm and dry  Head: normocephalic and atraumatic  Eyes: pupils equal, round, and reactive to light, extraocular eye movements intact, conjunctivae normal  Neck: neck supple and non tender without mass   Pulmonary/Chest: clear to auscultation bilaterally- no wheezes, rales or rhonchi, normal air movement, no respiratory distress  Cardiovascular: normal rate, normal S1 and S2 and no carotid bruits  Abdomen: soft, non-tender, non-distended, normal bowel sounds, no masses or organomegaly  Extremities: no cyanosis, no clubbing and no edema  Neurologic: no cranial nerve deficit and speech normal      Recent Labs     05/20/20  1515 05/21/20  0440   * 131*   K 5.8* 6.1*   CL 94* 98   CO2 18* 16* presented to the hospital on 5/20 with nausea, fatigue. She got admitted for further management due to ELIZABETH. Plan:    1. ELIZABETH with hyperkalemia 2/2 medications? . Does takes naproxen as needed at home also on abx for MAC per ID. Creatinine on presentation 12.8 and . No hx of HD. Nephrology following. Monitor kidney function closely. Hold nephrotoxic meds. S/p D50, insulin, bicarb. Monitor BG closely.      2. N/V/diarrhea/ epigastric pain likely 2/2 Elizabeth and mild retroperitoneal edema. Continue supportive measures. Resume ivf for now.      3. H/o recurrent MAC; Consult ID/ pulmonology. She was on outpatient regimen of inhaled amikacin/ ethambutol/ rifampin- hold for now.      4. Leukocytosis: monitor     5. Remote h/o tobacco         Code Status: FULL  DVT prophylaxis: heparin sub q     NOTE: This report was transcribed using voice recognition software. Every effort was made to ensure accuracy; however, inadvertent computerized transcription errors may be present.      Electronically signed by Guanakito Sullivan MD on 5/21/2020 at 11:05 AM

## 2020-05-21 NOTE — CONSULTS
[facility] MD Milton Prakash MD Davee Scull, MD Bea Farrow, ANTOINETTE Davis, DORA  Consultation  Patient's Name: Franky Delgado  1:54 PM  5/21/2020    Nephrologist: Milton Henderson MD    Reason for Consult: MATTEO  Requesting Physician:  No primary care provider on file. Chief Complaint: \"Sick\" (nausea vomiting diarrhea)    History Obtained From: Patient    History of Present Ilness:    Ms. 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 She tells me she \"always had trouble with rifampin\" when used previously. She received her first dose of rifampin on Thursday and immediately developed adverse symptoms. Symptoms included low back pain localizing to her upper buttocks bilaterally though ultimately spreading throughout her torso, consisting of severe myalgias and arthralgias. She felt chills. She felt immediate nausea, multiple episodes of emesis principally consisting the contents of her stomach without bile or hematemesis. Over the week she developed watery diarrhea without melena or hematochezia. Notes that she had no appetite and in fact \"ate nothing\" for 4 days and for \"at least 2 days\" had nothing to drink. She was lightheaded. She had a headache. She has severe fatigue and generalized weakness and sense of debilitation. Denies fever or chill, cough wheeze or sputum production beyond her usual pulmonary symptoms. No chest pain or palpitations. She had abdominal pain which she attributes to the retching. She does very little urine output, though denies hematuria or dysuria. She developed pruritus and a sense of feeling \"hot underneath my skin. \"  She took naproxen at least several times over the past several days.     She has no peripheral swelling or glycol (GLYCOLAX) packet 17 g, Daily PRN  promethazine (PHENERGAN) tablet 12.5 mg, Q6H PRN    Or  ondansetron (ZOFRAN) injection 4 mg, Q6H PRN  heparin (porcine) injection 5,000 Units, 3 times per day  pantoprazole (PROTONIX) injection 40 mg, Daily  0.9 % sodium chloride infusion, Continuous        Review of Systems:   Pertinent items are noted in HPI. Physical exam:   BP (!) 119/55   Pulse 72   Temp 98.9 °F (37.2 °C) (Axillary)   Resp 18   Ht 5' 2\" (1.575 m)   Wt 118 lb (53.5 kg)   SpO2 95%   BMI 21.58 kg/m²   Thin chronically ill-appearing age-appropriate white woman in no apparent distress  NC/AT EOMI sclera conjunctiva clear and anicteric mucous membranes are dry  Neck soft supple trachea midline no JVD no bruit  Coarse breath sounds predominantly on the right though heard bilaterally, no distinct crackles or wheeze  Regular rhythm normal S1 and S2 mildly tachycardic.   3/6 systolic ejection murmur left upper right sternal border  Abdomen soft nontender nondistended normal active bowel sounds no mass no hepatosplenomegaly  Distal extremities are without edema  Pulses 1-2+ x4  Integument is without rash or lesion  Moves all 4 extremities  Cranial nerves II to XII grossly intact  Awake alert appropriate, interactive      Data:   Labs:  CBC with Differential:    Lab Results   Component Value Date    WBC 14.6 05/21/2020    RBC 4.43 05/21/2020    HGB 12.5 05/21/2020    HCT 39.1 05/21/2020     05/21/2020    MCV 88.3 05/21/2020    MCH 28.2 05/21/2020    MCHC 32.0 05/21/2020    RDW 14.6 05/21/2020    LYMPHOPCT 11.0 05/20/2020    MONOPCT 6.5 05/20/2020    BASOPCT 0.6 05/20/2020    MONOSABS 1.14 05/20/2020    LYMPHSABS 1.93 05/20/2020    EOSABS 0.52 05/20/2020    BASOSABS 0.10 05/20/2020     CMP:    Lab Results   Component Value Date     05/21/2020    K 5.2 05/21/2020    K 4.4 01/01/2020     05/21/2020    CO2 18 05/21/2020     05/21/2020    CREATININE 12.6 05/21/2020    GFRAA 4

## 2020-05-21 NOTE — CONSULTS
2016 in [place]. She was treated for approximately 2 years and says she could not tolerate medications afterwards. These were restarted around 5/14/2020 and the patient developed severe weakness. She discontinued the antimycobacterial agents. She was tolerating Arikayce  · Acute kidney injury. This is unlikely secondary to Rifampin or Ethambutol. She reportedly was taking Naprosyn  · Leukocytosis associated to the above    Plan:    · Stop antimycobacterial agents  · Discontinue airborne isolation since this is not tuberculosis  · Check cultures, baseline ESR, CRP  · Nephrology following  · Will follow with you    Thank you for having us see this patient in consultation. I will be discussing this case with the treating physicians.     Odalys Clarity  1:17 PM  5/21/2020

## 2020-05-21 NOTE — PROCEDURES
PICC   Catheter insertion date 5/21/2020     Product Number:  [id number]   Lot No: [id number]   Gauge: 5 fr   Lumen: dual   Rt Brachial    Vein Diameter : 0.54 cm   Upper arm circumference (10CM ABOVE AC): 26 cm   Catheter Length : 35 cm(15 cm cut from a 50 cm line)   Internal Length: 34 cm   External Catheter Length: 1 cm   Ultrasound Used:yes  [facility]PS [facility] [place] confirms PICC tip is placed in the lower 1/3 of the SVC or at the Cavoatrial junction. Floor nurse notified PICC is okay to use.    : Christal Longo RN

## 2020-05-22 LAB
ALBUMIN SERPL-MCNC: 2.6 G/DL (ref 3.5–5.2)
ALP BLD-CCNC: 57 U/L (ref 35–104)
ALT SERPL-CCNC: 18 U/L (ref 0–32)
ANION GAP SERPL CALCULATED.3IONS-SCNC: 13 MMOL/L (ref 7–16)
ANION GAP SERPL CALCULATED.3IONS-SCNC: 14 MMOL/L (ref 7–16)
ANION GAP SERPL CALCULATED.3IONS-SCNC: 15 MMOL/L (ref 7–16)
AST SERPL-CCNC: 17 U/L (ref 0–31)
BILIRUB SERPL-MCNC: <0.2 MG/DL (ref 0–1.2)
BUN BLDV-MCNC: 112 MG/DL (ref 8–23)
BUN BLDV-MCNC: 118 MG/DL (ref 8–23)
BUN BLDV-MCNC: 53 MG/DL (ref 8–23)
C-REACTIVE PROTEIN: 3.2 MG/DL (ref 0–0.4)
CALCIUM SERPL-MCNC: 8.1 MG/DL (ref 8.6–10.2)
CALCIUM SERPL-MCNC: 8.2 MG/DL (ref 8.6–10.2)
CALCIUM SERPL-MCNC: 8.3 MG/DL (ref 8.6–10.2)
CHLORIDE BLD-SCNC: 101 MMOL/L (ref 98–107)
CHLORIDE BLD-SCNC: 105 MMOL/L (ref 98–107)
CHLORIDE BLD-SCNC: 105 MMOL/L (ref 98–107)
CO2: 19 MMOL/L (ref 22–29)
CO2: 20 MMOL/L (ref 22–29)
CO2: 23 MMOL/L (ref 22–29)
CREAT SERPL-MCNC: 12 MG/DL (ref 0.5–1)
CREAT SERPL-MCNC: 12.5 MG/DL (ref 0.5–1)
CREAT SERPL-MCNC: 6.6 MG/DL (ref 0.5–1)
GFR AFRICAN AMERICAN: 4
GFR AFRICAN AMERICAN: 4
GFR AFRICAN AMERICAN: 8
GFR NON-AFRICAN AMERICAN: 3 ML/MIN/1.73
GFR NON-AFRICAN AMERICAN: 3 ML/MIN/1.73
GFR NON-AFRICAN AMERICAN: 6 ML/MIN/1.73
GLUCOSE BLD-MCNC: 113 MG/DL (ref 74–99)
GLUCOSE BLD-MCNC: 129 MG/DL (ref 74–99)
GLUCOSE BLD-MCNC: 181 MG/DL (ref 74–99)
HCT VFR BLD CALC: 31.7 % (ref 34–48)
HEMOGLOBIN: 10.4 G/DL (ref 11.5–15.5)
MAGNESIUM: 2.2 MG/DL (ref 1.6–2.6)
MCH RBC QN AUTO: 28.8 PG (ref 26–35)
MCHC RBC AUTO-ENTMCNC: 32.8 % (ref 32–34.5)
MCV RBC AUTO: 87.8 FL (ref 80–99.9)
METER GLUCOSE: 237 MG/DL (ref 74–99)
PDW BLD-RTO: 14.6 FL (ref 11.5–15)
PHOSPHORUS: 6.7 MG/DL (ref 2.5–4.5)
PLATELET # BLD: 179 E9/L (ref 130–450)
PMV BLD AUTO: 10.5 FL (ref 7–12)
POTASSIUM SERPL-SCNC: 3.8 MMOL/L (ref 3.5–5)
POTASSIUM SERPL-SCNC: 5 MMOL/L (ref 3.5–5)
POTASSIUM SERPL-SCNC: 5.6 MMOL/L (ref 3.5–5)
RBC # BLD: 3.61 E12/L (ref 3.5–5.5)
SEDIMENTATION RATE, ERYTHROCYTE: 45 MM/HR (ref 0–20)
SODIUM BLD-SCNC: 137 MMOL/L (ref 132–146)
SODIUM BLD-SCNC: 139 MMOL/L (ref 132–146)
SODIUM BLD-SCNC: 139 MMOL/L (ref 132–146)
TOTAL PROTEIN: 5.2 G/DL (ref 6.4–8.3)
WBC # BLD: 13.1 E9/L (ref 4.5–11.5)

## 2020-05-22 PROCEDURE — 6360000002 HC RX W HCPCS: Performed by: NURSE PRACTITIONER

## 2020-05-22 PROCEDURE — 2500000003 HC RX 250 WO HCPCS: Performed by: INTERNAL MEDICINE

## 2020-05-22 PROCEDURE — 84100 ASSAY OF PHOSPHORUS: CPT

## 2020-05-22 PROCEDURE — 83735 ASSAY OF MAGNESIUM: CPT

## 2020-05-22 PROCEDURE — 85651 RBC SED RATE NONAUTOMATED: CPT

## 2020-05-22 PROCEDURE — 99232 SBSQ HOSP IP/OBS MODERATE 35: CPT | Performed by: INTERNAL MEDICINE

## 2020-05-22 PROCEDURE — 6370000000 HC RX 637 (ALT 250 FOR IP): Performed by: INTERNAL MEDICINE

## 2020-05-22 PROCEDURE — 2060000000 HC ICU INTERMEDIATE R&B

## 2020-05-22 PROCEDURE — 80053 COMPREHEN METABOLIC PANEL: CPT

## 2020-05-22 PROCEDURE — C9113 INJ PANTOPRAZOLE SODIUM, VIA: HCPCS | Performed by: NURSE PRACTITIONER

## 2020-05-22 PROCEDURE — 36415 COLL VENOUS BLD VENIPUNCTURE: CPT

## 2020-05-22 PROCEDURE — 86706 HEP B SURFACE ANTIBODY: CPT

## 2020-05-22 PROCEDURE — 6370000000 HC RX 637 (ALT 250 FOR IP): Performed by: NURSE PRACTITIONER

## 2020-05-22 PROCEDURE — 85027 COMPLETE CBC AUTOMATED: CPT

## 2020-05-22 PROCEDURE — 82962 GLUCOSE BLOOD TEST: CPT

## 2020-05-22 PROCEDURE — 2580000003 HC RX 258: Performed by: NURSE PRACTITIONER

## 2020-05-22 PROCEDURE — 80048 BASIC METABOLIC PNL TOTAL CA: CPT

## 2020-05-22 PROCEDURE — 80074 ACUTE HEPATITIS PANEL: CPT

## 2020-05-22 PROCEDURE — 86140 C-REACTIVE PROTEIN: CPT

## 2020-05-22 PROCEDURE — 90935 HEMODIALYSIS ONE EVALUATION: CPT | Performed by: INTERNAL MEDICINE

## 2020-05-22 PROCEDURE — 6360000002 HC RX W HCPCS: Performed by: INTERNAL MEDICINE

## 2020-05-22 PROCEDURE — APPSS30 APP SPLIT SHARED TIME 16-30 MINUTES: Performed by: NURSE PRACTITIONER

## 2020-05-22 PROCEDURE — 2580000003 HC RX 258: Performed by: INTERNAL MEDICINE

## 2020-05-22 PROCEDURE — 5A1D70Z PERFORMANCE OF URINARY FILTRATION, INTERMITTENT, LESS THAN 6 HOURS PER DAY: ICD-10-PCS | Performed by: INTERNAL MEDICINE

## 2020-05-22 RX ORDER — DEXTROSE MONOHYDRATE 25 G/50ML
25 INJECTION, SOLUTION INTRAVENOUS ONCE
Status: COMPLETED | OUTPATIENT
Start: 2020-05-22 | End: 2020-05-22

## 2020-05-22 RX ORDER — DEXTROSE MONOHYDRATE 25 G/50ML
25 INJECTION, SOLUTION INTRAVENOUS PRN
Status: DISCONTINUED | OUTPATIENT
Start: 2020-05-22 | End: 2020-05-22

## 2020-05-22 RX ORDER — HYDROCODONE BITARTRATE AND ACETAMINOPHEN 5; 325 MG/1; MG/1
1 TABLET ORAL ONCE
Status: COMPLETED | OUTPATIENT
Start: 2020-05-22 | End: 2020-05-22

## 2020-05-22 RX ADMIN — ONDANSETRON 4 MG: 2 INJECTION INTRAMUSCULAR; INTRAVENOUS at 22:12

## 2020-05-22 RX ADMIN — ACETAMINOPHEN 650 MG: 325 TABLET ORAL at 17:11

## 2020-05-22 RX ADMIN — SODIUM CHLORIDE, PRESERVATIVE FREE 300 UNITS: 5 INJECTION INTRAVENOUS at 08:43

## 2020-05-22 RX ADMIN — DEXTROSE MONOHYDRATE 25 G: 25 INJECTION, SOLUTION INTRAVENOUS at 01:25

## 2020-05-22 RX ADMIN — ONDANSETRON 4 MG: 2 INJECTION INTRAMUSCULAR; INTRAVENOUS at 13:28

## 2020-05-22 RX ADMIN — PANTOPRAZOLE SODIUM 40 MG: 40 INJECTION, POWDER, FOR SOLUTION INTRAVENOUS at 08:40

## 2020-05-22 RX ADMIN — SODIUM POLYSTYRENE SULFONATE 15 G: 15 SUSPENSION ORAL; RECTAL at 01:25

## 2020-05-22 RX ADMIN — SODIUM CHLORIDE, PRESERVATIVE FREE 300 UNITS: 5 INJECTION INTRAVENOUS at 22:12

## 2020-05-22 RX ADMIN — HEPARIN SODIUM 5000 UNITS: 10000 INJECTION, SOLUTION INTRAVENOUS; SUBCUTANEOUS at 13:31

## 2020-05-22 RX ADMIN — HEPARIN SODIUM 5000 UNITS: 10000 INJECTION, SOLUTION INTRAVENOUS; SUBCUTANEOUS at 05:44

## 2020-05-22 RX ADMIN — ACETAMINOPHEN 650 MG: 325 TABLET ORAL at 05:42

## 2020-05-22 RX ADMIN — Medication 10 ML: at 08:40

## 2020-05-22 RX ADMIN — Medication 10 ML: at 22:13

## 2020-05-22 RX ADMIN — SODIUM BICARBONATE 50 MEQ: 84 INJECTION, SOLUTION INTRAVENOUS at 01:25

## 2020-05-22 RX ADMIN — SODIUM CHLORIDE: 9 INJECTION, SOLUTION INTRAVENOUS at 18:59

## 2020-05-22 RX ADMIN — HYDROCODONE BITARTRATE AND ACETAMINOPHEN 1 TABLET: 5; 325 TABLET ORAL at 22:12

## 2020-05-22 RX ADMIN — HEPARIN SODIUM 5000 UNITS: 10000 INJECTION, SOLUTION INTRAVENOUS; SUBCUTANEOUS at 22:14

## 2020-05-22 RX ADMIN — INSULIN HUMAN 6 UNITS: 100 INJECTION, SOLUTION PARENTERAL at 01:24

## 2020-05-22 RX ADMIN — CALCIUM GLUCONATE 1 G: 98 INJECTION, SOLUTION INTRAVENOUS at 01:24

## 2020-05-22 ASSESSMENT — PAIN SCALES - GENERAL
PAINLEVEL_OUTOF10: 3
PAINLEVEL_OUTOF10: 10
PAINLEVEL_OUTOF10: 0
PAINLEVEL_OUTOF10: 10
PAINLEVEL_OUTOF10: 0

## 2020-05-22 ASSESSMENT — PAIN DESCRIPTION - LOCATION
LOCATION: ABDOMEN
LOCATION: HEAD

## 2020-05-22 ASSESSMENT — PAIN DESCRIPTION - PAIN TYPE: TYPE: ACUTE PAIN

## 2020-05-22 NOTE — PROCEDURES
Chun Warren is a 60 y.o. female patient. 1. Acute renal failure, unspecified acute renal failure type [facility])      Past Medical History:   Diagnosis Date    Bronchiectasis ([address]     COPD (chronic obstructive pulmonary disease) ([facility])     Mycobacterium avium complex ([facility])      Blood pressure (!) 115/59, pulse 77, temperature 98.9 °F (37.2 °C), temperature source Oral, resp. rate 20, height 5' 2\" (1.575 m), weight 131 lb 9.6 oz (59.7 kg), SpO2 93 %. Central Line  Date/Time: 5/22/2020 12:26 PM  Performed by: Lucas Hennessy DO  Authorized by: Audrey Porter MD   Consent: Written consent obtained. Risks and benefits: risks, benefits and alternatives were discussed  Consent given by: patient  Patient identity confirmed: verbally with patient  Time out: Immediately prior to procedure a \"time out\" was called to verify the correct patient, procedure, equipment, support staff and site/side marked as required.   Indications: vascular access  Anesthesia: local infiltration    Anesthesia:  Local Anesthetic: lidocaine 1% with epinephrine  Preparation: skin prepped with 2% chlorhexidine  Skin prep agent dried: skin prep agent completely dried prior to procedure  Sterile barriers: all five maximum sterile barriers used - cap, mask, sterile gown, sterile gloves, and large sterile sheet  Hand hygiene: hand hygiene performed prior to central venous catheter insertion  Location details: right femoral  Patient position: flat  Catheter type: double lumen  Catheter size: 14 Fr  Pre-procedure: landmarks identified  Number of attempts: 1  Successful placement: yes  Post-procedure: line sutured  Assessment: blood return through all ports and free fluid flow  Patient tolerance: Patient tolerated the procedure well with no immediate complications          Lucas Hennessy DO  5/22/2020

## 2020-05-22 NOTE — PROGRESS NOTES
[address] [place] [address] [facility]  NIKKIIDA  Progress Note    SUBJECTIVE:  Chief Complaint   Patient presents with    Nausea    Emesis     since     Fatigue    Headache     The patient is still feeling generalized malaise, dyspnea. Nausea and vomiting no longer present. She had a dialysis catheter placed. Review of systems:  As stated above in the chief complaint, otherwise negative. Medications:  Scheduled Meds:   heparin flush  3 mL Intravenous 2 times per day    sodium chloride flush  10 mL Intravenous 2 times per day    heparin (porcine)  5,000 Units Subcutaneous 3 times per day    pantoprazole  40 mg Intravenous Daily     Continuous Infusions:   sodium chloride 150 mL/hr at 20 1949     PRN Meds:trimethobenzamide, sodium chloride flush, heparin flush, sodium chloride flush, acetaminophen **OR** acetaminophen, polyethylene glycol, promethazine **OR** ondansetron    OBJECTIVE:  BP (!) 115/59   Pulse 77   Temp 98.9 °F (37.2 °C) (Oral)   Resp 20   Ht 5' 2\" (1.575 m)   Wt 131 lb 9.6 oz (59.7 kg)   SpO2 93%   BMI 24.07 kg/m²   Temp  Av.8 °F (37.1 °C)  Min: 98.6 °F (37 °C)  Max: 98.9 °F (37.2 °C)  Constitutional: The patient is lying in bed. She looks uncomfortable but awake and alert. Skin: Warm and dry. No rashes were noted. HEENT: Round and reactive pupils. Moist mucous membranes. No ulcerations or thrush. Neck: Supple to movements. Chest: Good breath sounds. No crackles. Cardiovascular: Heart sounds rhythmic and regular. Abdomen: Positive bowel sounds to auscultation. Benign to palpation. Extremities: Bilateral minimal lower extremity edema. Lines: Right femoral temporary HD catheter 2020.     Laboratory and Tests Review:  Lab Results   Component Value Date    WBC 13.1 (H) 2020    WBC 14.6 (H) 2020    WBC 17.6 (H) 2020    HGB 10.4 (L) 2020    HCT 31.7 (L) 2020    MCV 87.8 2020     2020     Lab Results Component Value Date    NEUTROABS 13.10 (H) 05/20/2020    NEUTROABS 2.79 04/30/2020    NEUTROABS 3.56 02/28/2020     No results found for: [facility]  Lab Results   Component Value Date    ALT 18 05/22/2020    AST 17 05/22/2020    ALKPHOS 57 05/22/2020    BILITOT <0.2 05/22/2020     Lab Results   Component Value Date     05/22/2020    K 5.0 05/22/2020    K 4.4 01/01/2020     05/22/2020    CO2 20 05/22/2020     05/22/2020    CREATININE 12.0 05/22/2020    CREATININE 12.5 05/21/2020    CREATININE 12.4 05/21/2020    GFRAA 4 05/22/2020    LABGLOM 3 05/22/2020    GLUCOSE 129 05/22/2020    PROT 5.2 05/22/2020    LABALBU 2.6 05/22/2020    CALCIUM 8.2 05/22/2020    BILITOT <0.2 05/22/2020    ALKPHOS 57 05/22/2020    AST 17 05/22/2020    ALT 18 05/22/2020     Lab Results   Component Value Date    CRP 3.2 (H) 05/22/2020    CRP 0.4 04/30/2020    CRP 0.6 (H) 02/28/2020     Lab Results   Component Value Date    SEDRATE 45 (H) 05/22/2020    SEDRATE 55 (H) 04/30/2020    SEDRATE 41 (H) 02/28/2020     Radiology:  Chest x-ray reviewed    Microbiology:       ASSESSMENT:  · Recurrent MAC infection. Previously treated in 2016 with Rifampin, Clarithromycin, Ethambutol. Patient stopped treatment because she could not tolerate medications. She thought Clarithromycin had made her sick. She now thinks it was most likely Rifampin. She started Arikayce earlier this month. She also began taking Rifampin and Ethambutol 1 week prior to the admission. He became extremely weak and stopped eating and drinking. She was having aches and was taking naproxen.     · Acute kidney injury, no need for dialysis  · Leukocytosis secondary to MATTEO    PLAN:  · Continue supportive treatment  · The patient is no longer interested on MAC treatment  · Check final cultures  · Hemodialysis    Case discussed with Dr. Milton Linares  12:17 PM  5/22/2020

## 2020-05-22 NOTE — FLOWSHEET NOTE
05/22/20 1639   Vital Signs   /70   Temp 97.9 °F (36.6 °C)   Pulse 70   Resp 16   Weight 130 lb 8.2 oz (59.2 kg)   Weight Method Bed scale   Percent Weight Change -0.67   Pain Assessment   Pain Assessment 0-10   Pain Level 0   Post-Hemodialysis Assessment   Post-Treatment Procedures Blood returned;Catheter capped, clamped and heparinized x 2 ports   Machine Disinfection Process Acid/Vinegar Clean;Heat Disinfect; Exterior Machine Disinfection   Rinseback Volume (ml) 300 ml   Total Liters Processed (l/min) 34.7 l/min   Dialyzer Clearance Lightly streaked   Duration of Treatment (minutes) 150 minutes   Hemodialysis Output (ml) 300 ml   NET Removed (ml) 0 ml   Tolerated Treatment Good   Patient Response to Treatment Tolerated well; Cath care per [facility] policy; closed per fill volume with heparin, clamped, and capped; post report to Shai Arreaga RN   Bilateral Breath Sounds Clear   Edema Generalized

## 2020-05-22 NOTE — CARE COORDINATION
CASE MANAGEMENT. .... Chart reviewed. Renal continues to follow for acute renal failure. Following labs. Adjusting meds accordingly. PICC line placed yesterday. /hr. Monitoring urine output. Attempting to hold off on HD. ID and Pulm also following. Patient off antibiotics. Tolerating room air. Will cont to follow and assist with needs.

## 2020-05-22 NOTE — PROGRESS NOTES
nontender, nondistended, NABS  Ext: no edema, feet warm  Skin: dry, no rash  Neuro: awake, alert, interactive. (+) Asterixis      DATA:    Recent Labs     05/20/20  1515 05/21/20  0440 05/22/20  0550   WBC 17.6* 14.6* 13.1*   HGB 12.4 12.5 10.4*   HCT 38.5 39.1 31.7*   MCV 87.1 88.3 87.8    155 179     Recent Labs     05/20/20  1515 05/21/20  0440  05/21/20  1645 05/21/20  2345 05/22/20  0550   * 131*   < > 133 139 139   K 5.8* 6.1*   < > 5.8* 5.6* 5.0   CL 94* 98   < > 101 105 105   CO2 18* 16*   < > 16* 19* 20*   MG  --   --   --   --   --  2.2   PHOS  --   --   --   --   --  6.7*   * 131*   < > 119* 118* 112*   CREATININE 12.8* 12.9*   < > 12.4* 12.5* 12.0*   ALT 33* 27  --   --   --  18   AST 26 20  --   --   --  17   BILITOT 0.2 0.3  --   --   --  <0.2   ALKPHOS 75 88  --   --   --  57    < > = values in this interval not displayed. Lab Results   Component Value Date    LABPROT 0.6 (H) 05/20/2020    LABPROT 0.6 05/20/2020         Assessment  1. Acute kidney injury, severe, oliguric, secondary to severe prerenal azotemia, possibly evolved into acute tubular necrosis due to nausea, vomiting, anorexia with very poor intake, watery diarrhea, with possible superimposed rifampin-induced AIN versus direct tubular toxicity. Status post IV normal saline bolus and IV fluid resuscitation, BUN and creatinine have improved slightly. Hyperkalemic, though mildly. Metabolic acidosis, wide anion gap, also mild at this point. 2. Hyperkalemia secondary acute kidney injury, decreased effective circulating volume, improving with treatment  3. Metabolic acidosis, wide anion gap, secondary to acute kidney injury, exacerbated by diarrhea  4. MAC treated with rifampin, ethambutol, amikacin     Slight improvement in BUN and creatinine, though much of this may simply be delusional due to increased intravascular volume from the IV fluid administered. Frankly uremic.   Potassium normalized with insulin/D50, Kayexalate. .  No rub on physical exam; mild asterixis    Recommendations  1. Continue IV normal saline at 150 cc/h  2. We will ask vascular surgery to place temporary dialysis catheter  3. Hemodialysis to follow, principally for the severe uremia, ongoing symptoms, no ultrafiltration planned  4. Continue supportive care  5. Follow laboratory studies, UO  6. Continue to hold rifampin     Long discussion RE: Dialysis. My hope is that she will require just a limited number of treatments to control symptoms, though difficult to predict long-term prognosis at this point.       Electronically signed by Erica Molina MD on 5/22/2020 at 10:54 AM

## 2020-05-23 ENCOUNTER — APPOINTMENT (OUTPATIENT)
Dept: GENERAL RADIOLOGY | Age: 65
DRG: 177 | End: 2020-05-23
Payer: MEDICARE

## 2020-05-23 LAB
ANION GAP SERPL CALCULATED.3IONS-SCNC: 11 MMOL/L (ref 7–16)
BUN BLDV-MCNC: 56 MG/DL (ref 8–23)
CALCIUM SERPL-MCNC: 8.1 MG/DL (ref 8.6–10.2)
CHLORIDE BLD-SCNC: 105 MMOL/L (ref 98–107)
CO2: 25 MMOL/L (ref 22–29)
CREAT SERPL-MCNC: 7.4 MG/DL (ref 0.5–1)
GFR AFRICAN AMERICAN: 7
GFR NON-AFRICAN AMERICAN: 6 ML/MIN/1.73
GLUCOSE BLD-MCNC: 108 MG/DL (ref 74–99)
HCT VFR BLD CALC: 33.1 % (ref 34–48)
HEMOGLOBIN: 10.7 G/DL (ref 11.5–15.5)
MAGNESIUM: 1.9 MG/DL (ref 1.6–2.6)
MCH RBC QN AUTO: 28.1 PG (ref 26–35)
MCHC RBC AUTO-ENTMCNC: 32.3 % (ref 32–34.5)
MCV RBC AUTO: 86.9 FL (ref 80–99.9)
PDW BLD-RTO: 14.9 FL (ref 11.5–15)
PHOSPHORUS: 5 MG/DL (ref 2.5–4.5)
PLATELET # BLD: 207 E9/L (ref 130–450)
PMV BLD AUTO: 10.2 FL (ref 7–12)
POTASSIUM SERPL-SCNC: 4.1 MMOL/L (ref 3.5–5)
RBC # BLD: 3.81 E12/L (ref 3.5–5.5)
SODIUM BLD-SCNC: 141 MMOL/L (ref 132–146)
WBC # BLD: 13.2 E9/L (ref 4.5–11.5)

## 2020-05-23 PROCEDURE — 36415 COLL VENOUS BLD VENIPUNCTURE: CPT

## 2020-05-23 PROCEDURE — APPSS30 APP SPLIT SHARED TIME 16-30 MINUTES: Performed by: NURSE PRACTITIONER

## 2020-05-23 PROCEDURE — 2060000000 HC ICU INTERMEDIATE R&B

## 2020-05-23 PROCEDURE — 6360000002 HC RX W HCPCS: Performed by: INTERNAL MEDICINE

## 2020-05-23 PROCEDURE — 99232 SBSQ HOSP IP/OBS MODERATE 35: CPT | Performed by: INTERNAL MEDICINE

## 2020-05-23 PROCEDURE — 85027 COMPLETE CBC AUTOMATED: CPT

## 2020-05-23 PROCEDURE — 2580000003 HC RX 258: Performed by: NURSE PRACTITIONER

## 2020-05-23 PROCEDURE — 84100 ASSAY OF PHOSPHORUS: CPT

## 2020-05-23 PROCEDURE — 6360000002 HC RX W HCPCS: Performed by: NURSE PRACTITIONER

## 2020-05-23 PROCEDURE — 90935 HEMODIALYSIS ONE EVALUATION: CPT

## 2020-05-23 PROCEDURE — C9113 INJ PANTOPRAZOLE SODIUM, VIA: HCPCS | Performed by: NURSE PRACTITIONER

## 2020-05-23 PROCEDURE — 80048 BASIC METABOLIC PNL TOTAL CA: CPT

## 2020-05-23 PROCEDURE — 83735 ASSAY OF MAGNESIUM: CPT

## 2020-05-23 PROCEDURE — 97161 PT EVAL LOW COMPLEX 20 MIN: CPT

## 2020-05-23 PROCEDURE — 99233 SBSQ HOSP IP/OBS HIGH 50: CPT | Performed by: FAMILY MEDICINE

## 2020-05-23 PROCEDURE — 71045 X-RAY EXAM CHEST 1 VIEW: CPT

## 2020-05-23 PROCEDURE — 6370000000 HC RX 637 (ALT 250 FOR IP): Performed by: NURSE PRACTITIONER

## 2020-05-23 RX ADMIN — Medication 10 ML: at 13:31

## 2020-05-23 RX ADMIN — Medication 10 ML: at 20:10

## 2020-05-23 RX ADMIN — SODIUM CHLORIDE, PRESERVATIVE FREE 300 UNITS: 5 INJECTION INTRAVENOUS at 13:31

## 2020-05-23 RX ADMIN — ONDANSETRON 4 MG: 2 INJECTION INTRAMUSCULAR; INTRAVENOUS at 13:24

## 2020-05-23 RX ADMIN — HEPARIN SODIUM 5000 UNITS: 10000 INJECTION, SOLUTION INTRAVENOUS; SUBCUTANEOUS at 22:50

## 2020-05-23 RX ADMIN — SODIUM CHLORIDE, PRESERVATIVE FREE 300 UNITS: 5 INJECTION INTRAVENOUS at 20:10

## 2020-05-23 RX ADMIN — ACETAMINOPHEN 650 MG: 325 TABLET ORAL at 04:04

## 2020-05-23 RX ADMIN — HEPARIN SODIUM 5000 UNITS: 10000 INJECTION, SOLUTION INTRAVENOUS; SUBCUTANEOUS at 13:24

## 2020-05-23 RX ADMIN — ONDANSETRON 4 MG: 2 INJECTION INTRAMUSCULAR; INTRAVENOUS at 04:04

## 2020-05-23 RX ADMIN — HEPARIN SODIUM 5000 UNITS: 10000 INJECTION, SOLUTION INTRAVENOUS; SUBCUTANEOUS at 05:21

## 2020-05-23 RX ADMIN — ONDANSETRON 4 MG: 2 INJECTION INTRAMUSCULAR; INTRAVENOUS at 20:10

## 2020-05-23 RX ADMIN — PANTOPRAZOLE SODIUM 40 MG: 40 INJECTION, POWDER, FOR SOLUTION INTRAVENOUS at 13:24

## 2020-05-23 ASSESSMENT — PAIN SCALES - GENERAL
PAINLEVEL_OUTOF10: 5
PAINLEVEL_OUTOF10: 0
PAINLEVEL_OUTOF10: 10
PAINLEVEL_OUTOF10: 0

## 2020-05-23 NOTE — PROGRESS NOTES
morning. Avoid nephrotoxic agents. Continue to follow closely. Nephrology input appreciated. 2.N/V/D- improved today. With emesis this morning. CT abdomen mesenteric vein and to a lesser extent retroperitoneal edema etiology unclear. May be 2/2 renal failure. Mild bilateral perinephric stranding, which medical renal disease no urolithiasis or hydronephrosis. Clustered nodules in the lung bases right greater than left consistent with Mycobacterium avium complex infection. Continue antiemetics/PPI. Consider GI consultation. 3.Leukocytosis-Continues to improved this morning. Likely reactive. Patient remains afebrile WBC 17.6>14.6> 13.1>13.2. Respiratory panel negative. 4. Hx COPD-currently not in exacerbation. Continues on room air tolerating well SPO2 93%. Continue to monitor closely. 5. Hx Mycobacterium Avium Complex-patient follows with infectious disease outpatient. Previously treated in 2016 with rifampin/clarithromycin/ethambutol. Patient stopped taking treatment because she could not tolerate medications. Earlier this month patient was started on Arikayce. Patient refusing further treatment for MAC. Continue supportive care infectious disease input appreciated. NOTE: This report was transcribed using voice recognition software. Every effort was made to ensure accuracy; however, inadvertent computerized transcription errors may be present. Electronically signed by Bonnie Merida CNP on 5/23/2020 at 9:33 AM

## 2020-05-23 NOTE — PROGRESS NOTES
Physical Therapy    Facility/Department: [address] INTERMEDIATE 1  Initial Assessment    NAME: Al Peng  : 1955  MRN: [id number]    Date of Service: 2020      Attending Provider:  Rukhsana Vázquez MD    Evaluating PT:  Viraj Carney. Gina Noyola, P.T. Room #:  6933/9670-M  Diagnosis:  ARF  Pertinent PMHx/PSHx:  Mycobacterium avium complex x 7 years, COPD  Precautions:  Falls, bed/chair alarm    SUBJECTIVE:    Pt lives alone in a 3rd floor apt with 3 stairs and 2 rails to enter. There are 21 steps and 2 rail to her 3rd floor apt. Pt ambulated with no AD PTA. OBJECTIVE:   Initial Evaluation  Date: 20 Treatment Short Term/ Long Term   Goals   Was pt agreeable to Eval/treatment? yes     Does pt have pain? C/o stomach pain     Bed Mobility  Rolling: supervision  Supine to sit: supervision  Sit to supine: supervision  Scooting: supervision  Independent    Transfers Sit to stand: NA, pt ill  Stand to sit: NA  Stand pivot: NA  Independent    Ambulation   NA  150 feet with ww if needed Independent    Stair negotiation: ascended and descended NA  21 steps with 1 rail supervision   AM-PAC 6 Clicks 79/52       BLE ROM is WFL. BLE strength is grossly 4/5. Sensation:  Pt denies numbness and tingling to extremities  Edema:  None noted  Balance: sitting is supervision  Endurance: fair-    ASSESSMENT:    Comments:  Pt reports she feels nauseated today and not feeling well. She reports she was vomiting this am prior to dialysis. Pt had labored breathing throughout PT eval while on supplemental O2. Pt sat EOB and after 3 min c/o fatigue and needed to lay back down. Pt has decreased strength and endurance along with respiratory impairment that is limiting functional mobility at this time. Pt was left supine in bed with call light left by patient. Chair/bed alarm: bed alarm was activated. Pt's/ family goals   1. To feel better and go home.      Patient and or family understand(s) diagnosis,

## 2020-05-23 NOTE — PROGRESS NOTES
[address] [place] [address] [facility]  [facility]  Progress Note    SUBJECTIVE:  Chief Complaint   Patient presents with    Nausea    Emesis     since     Fatigue    Headache     C/o generalized myalgias,  Especially chest and stomach. + n/v this am. + sob overnight, on 2 L/min O2. Pt seen on HD. No fevers. +BM, no diarrhea    Review of systems:  As stated above in the chief complaint, otherwise negative. Medications:  Scheduled Meds:   heparin flush  3 mL Intravenous 2 times per day    sodium chloride flush  10 mL Intravenous 2 times per day    heparin (porcine)  5,000 Units Subcutaneous 3 times per day    pantoprazole  40 mg Intravenous Daily     Continuous Infusions:    PRN Meds:trimethobenzamide, sodium chloride flush, heparin flush, sodium chloride flush, acetaminophen **OR** acetaminophen, polyethylene glycol, promethazine **OR** ondansetron    OBJECTIVE:  BP (!) 149/60   Pulse 76   Temp 98 °F (36.7 °C)   Resp 20   Ht 5' 2\" (1.575 m)   Wt 129 lb 6.6 oz (58.7 kg)   SpO2 94%   BMI 23.67 kg/m²   Temp  Av.3 °F (36.8 °C)  Min: 97.9 °F (36.6 °C)  Max: 98.9 °F (37.2 °C)  Constitutional: The patient is lying in bed. She looks uncomfortable but awake and alert. Skin: Warm and dry. No rashes were noted. HEENT: Round and reactive pupils. Moist mucous membranes. No ulcerations or thrush. Neck: Supple to movements. Chest: Good breath sounds. Crackles right middle lobe anteriorly, right base posteriorly  Cardiovascular: Heart sounds rhythmic and regular. Abdomen: Positive bowel sounds to auscultation. Benign to palpation. Extremities: Bilateral minimal lower extremity edema. Lines: Right femoral temporary HD catheter 2020.     Laboratory and Tests Review:  Lab Results   Component Value Date    WBC 13.2 (H) 2020    WBC 13.1 (H) 2020    WBC 14.6 (H) 2020    HGB 10.7 (L) 2020    HCT 33.1 (L) 2020    MCV 86.9 2020     2020     Lab Results   Component Value Date    NEUTROABS 13.10 (H) 05/20/2020    NEUTROABS 2.79 04/30/2020    NEUTROABS 3.56 02/28/2020     No results found for: [facility]  Lab Results   Component Value Date    ALT 18 05/22/2020    AST 17 05/22/2020    ALKPHOS 57 05/22/2020    BILITOT <0.2 05/22/2020     Lab Results   Component Value Date     05/23/2020    K 4.1 05/23/2020    K 4.4 01/01/2020     05/23/2020    CO2 25 05/23/2020    BUN 56 05/23/2020    CREATININE 7.4 05/23/2020    CREATININE 6.6 05/22/2020    CREATININE 12.0 05/22/2020    GFRAA 7 05/23/2020    LABGLOM 6 05/23/2020    GLUCOSE 108 05/23/2020    PROT 5.2 05/22/2020    LABALBU 2.6 05/22/2020    CALCIUM 8.1 05/23/2020    BILITOT <0.2 05/22/2020    ALKPHOS 57 05/22/2020    AST 17 05/22/2020    ALT 18 05/22/2020     Lab Results   Component Value Date    CRP 3.2 (H) 05/22/2020    CRP 0.4 04/30/2020    CRP 0.6 (H) 02/28/2020     Lab Results   Component Value Date    SEDRATE 45 (H) 05/22/2020    SEDRATE 55 (H) 04/30/2020    SEDRATE 41 (H) 02/28/2020     Radiology:  CT ABD/pelvis   1.  Mesenteric vein and to a lesser extent retroperitoneal edema, the   etiology of which is unclear. It may be due to patient's history of   renal failure. Other etiologies cannot be excluded. 2.  Mild bilateral perinephric stranding, which medical renal disease. No urolithiasis or hydronephrosis. 3.  Clustered nodules in the lung bases, right greater than left. Scarring in the right middle and lower lobes. Findings are likely   consistent with patient's history of mycobacterium avium complex   infection. Microbiology:   Resp viral panel neg    ASSESSMENT:  · Recurrent MAC infection. Previously treated in 2016 with Rifampin, Clarithromycin, Ethambutol. Patient stopped treatment because she could not tolerate medications. She thought Clarithromycin had made her sick. She now thinks it was most likely Rifampin. She started Arikayce earlier this month.   She also began

## 2020-05-24 LAB
ANION GAP SERPL CALCULATED.3IONS-SCNC: 5 MMOL/L (ref 7–16)
BUN BLDV-MCNC: 36 MG/DL (ref 8–23)
CALCIUM SERPL-MCNC: 8.2 MG/DL (ref 8.6–10.2)
CHLORIDE BLD-SCNC: 102 MMOL/L (ref 98–107)
CO2: 28 MMOL/L (ref 22–29)
CREAT SERPL-MCNC: 5.9 MG/DL (ref 0.5–1)
GFR AFRICAN AMERICAN: 9
GFR NON-AFRICAN AMERICAN: 7 ML/MIN/1.73
GLUCOSE BLD-MCNC: 110 MG/DL (ref 74–99)
HCT VFR BLD CALC: 29.1 % (ref 34–48)
HEMOGLOBIN: 9.4 G/DL (ref 11.5–15.5)
MAGNESIUM: 2 MG/DL (ref 1.6–2.6)
MCH RBC QN AUTO: 28.7 PG (ref 26–35)
MCHC RBC AUTO-ENTMCNC: 32.3 % (ref 32–34.5)
MCV RBC AUTO: 89 FL (ref 80–99.9)
PDW BLD-RTO: 14.8 FL (ref 11.5–15)
PHOSPHORUS: 4.8 MG/DL (ref 2.5–4.5)
PLATELET # BLD: 201 E9/L (ref 130–450)
PMV BLD AUTO: 10.1 FL (ref 7–12)
POTASSIUM SERPL-SCNC: 4 MMOL/L (ref 3.5–5)
RBC # BLD: 3.27 E12/L (ref 3.5–5.5)
SARS-COV-2, NAAT: NOT DETECTED
SODIUM BLD-SCNC: 135 MMOL/L (ref 132–146)
WBC # BLD: 10.3 E9/L (ref 4.5–11.5)

## 2020-05-24 PROCEDURE — 6360000002 HC RX W HCPCS: Performed by: NURSE PRACTITIONER

## 2020-05-24 PROCEDURE — 6370000000 HC RX 637 (ALT 250 FOR IP): Performed by: NURSE PRACTITIONER

## 2020-05-24 PROCEDURE — 2700000000 HC OXYGEN THERAPY PER DAY

## 2020-05-24 PROCEDURE — 6360000002 HC RX W HCPCS: Performed by: INTERNAL MEDICINE

## 2020-05-24 PROCEDURE — 2580000003 HC RX 258: Performed by: NURSE PRACTITIONER

## 2020-05-24 PROCEDURE — 85027 COMPLETE CBC AUTOMATED: CPT

## 2020-05-24 PROCEDURE — 83735 ASSAY OF MAGNESIUM: CPT

## 2020-05-24 PROCEDURE — 99233 SBSQ HOSP IP/OBS HIGH 50: CPT | Performed by: FAMILY MEDICINE

## 2020-05-24 PROCEDURE — U0002 COVID-19 LAB TEST NON-CDC: HCPCS

## 2020-05-24 PROCEDURE — 36592 COLLECT BLOOD FROM PICC: CPT

## 2020-05-24 PROCEDURE — APPSS30 APP SPLIT SHARED TIME 16-30 MINUTES: Performed by: NURSE PRACTITIONER

## 2020-05-24 PROCEDURE — 36415 COLL VENOUS BLD VENIPUNCTURE: CPT

## 2020-05-24 PROCEDURE — 84100 ASSAY OF PHOSPHORUS: CPT

## 2020-05-24 PROCEDURE — 80048 BASIC METABOLIC PNL TOTAL CA: CPT

## 2020-05-24 PROCEDURE — 2060000000 HC ICU INTERMEDIATE R&B

## 2020-05-24 PROCEDURE — C9113 INJ PANTOPRAZOLE SODIUM, VIA: HCPCS | Performed by: NURSE PRACTITIONER

## 2020-05-24 RX ORDER — MECLIZINE HCL 12.5 MG/1
25 TABLET ORAL 3 TIMES DAILY PRN
Status: DISCONTINUED | OUTPATIENT
Start: 2020-05-24 | End: 2020-05-30 | Stop reason: HOSPADM

## 2020-05-24 RX ADMIN — MECLIZINE 25 MG: 12.5 TABLET ORAL at 12:56

## 2020-05-24 RX ADMIN — Medication 10 ML: at 10:05

## 2020-05-24 RX ADMIN — SODIUM CHLORIDE, PRESERVATIVE FREE 300 UNITS: 5 INJECTION INTRAVENOUS at 10:32

## 2020-05-24 RX ADMIN — SODIUM CHLORIDE, PRESERVATIVE FREE 300 UNITS: 5 INJECTION INTRAVENOUS at 20:46

## 2020-05-24 RX ADMIN — HEPARIN SODIUM 5000 UNITS: 10000 INJECTION, SOLUTION INTRAVENOUS; SUBCUTANEOUS at 06:28

## 2020-05-24 RX ADMIN — HEPARIN SODIUM 5000 UNITS: 10000 INJECTION, SOLUTION INTRAVENOUS; SUBCUTANEOUS at 14:44

## 2020-05-24 RX ADMIN — PANTOPRAZOLE SODIUM 40 MG: 40 INJECTION, POWDER, FOR SOLUTION INTRAVENOUS at 10:05

## 2020-05-24 RX ADMIN — ACETAMINOPHEN 650 MG: 325 TABLET ORAL at 20:46

## 2020-05-24 RX ADMIN — ONDANSETRON 4 MG: 2 INJECTION INTRAMUSCULAR; INTRAVENOUS at 10:05

## 2020-05-24 RX ADMIN — HEPARIN SODIUM 5000 UNITS: 10000 INJECTION, SOLUTION INTRAVENOUS; SUBCUTANEOUS at 21:40

## 2020-05-24 ASSESSMENT — PAIN SCALES - GENERAL
PAINLEVEL_OUTOF10: 8
PAINLEVEL_OUTOF10: 0
PAINLEVEL_OUTOF10: 5

## 2020-05-24 NOTE — CONSULTS
swelling, deformity or tenderness   Neurologic: reflexes normal and symmetric, no cranial nerve deficit noted    LABS/IMAGING:    CBC:  Lab Results   Component Value Date    WBC 13.2 (H) 05/23/2020    HGB 10.7 (L) 05/23/2020    HCT 33.1 (L) 05/23/2020    MCV 86.9 05/23/2020     05/23/2020    LYMPHOPCT 11.0 (L) 05/20/2020    RBC 3.81 05/23/2020    MCH 28.1 05/23/2020    MCHC 32.3 05/23/2020    RDW 14.9 05/23/2020    NEUTOPHILPCT 74.2 05/20/2020    MONOPCT 6.5 05/20/2020    BASOPCT 0.6 05/20/2020    NEUTROABS 13.10 (H) 05/20/2020    LYMPHSABS 1.93 05/20/2020    MONOSABS 1.14 (H) 05/20/2020    EOSABS 0.52 (H) 05/20/2020    BASOSABS 0.10 05/20/2020       Recent Labs     05/23/20  0415 05/22/20  0550 05/21/20  0440   WBC 13.2* 13.1* 14.6*   HGB 10.7* 10.4* 12.5   HCT 33.1* 31.7* 39.1   MCV 86.9 87.8 88.3    179 155       BMP:   Recent Labs     05/22/20  0550 05/22/20  1900 05/23/20  0415    137 141   K 5.0 3.8 4.1    101 105   CO2 20* 23 25   PHOS 6.7*  --  5.0*   * 53* 56*   CREATININE 12.0* 6.6* 7.4*       MG:   Lab Results   Component Value Date    MG 1.9 05/23/2020     Ca/Phos:   Lab Results   Component Value Date    CALCIUM 8.1 (L) 05/23/2020    PHOS 5.0 (H) 05/23/2020     Amylase: No results found for: AMYLASE  Lipase:   Lab Results   Component Value Date    LIPASE 97 (H) 05/20/2020     LIVER PROFILE:   Recent Labs     05/21/20  0440 05/22/20  0550   AST 20 17   ALT 27 18   BILITOT 0.3 <0.2   ALKPHOS 88 57       PT/INR: No results for input(s): PROTIME, INR in the last 72 hours. APTT: No results for input(s): APTT in the last 72 hours.     Cardiac Enzymes:  Lab Results   Component Value Date    CKTOTAL 22 05/20/2020    TROPONINI <0.01 12/28/2019         :    PROBLEM LIST:  Patient Active Problem List   Diagnosis    Organizing pneumonia ([address]    Mycobacterium [facility]ium complex [facility]    Bronchiectasis ([address]    COPD (chronic obstructive pulmonary disease) ([facility] [facility]    Pneumonia due to

## 2020-05-24 NOTE — PROGRESS NOTES
non-distended, normal bowel sounds, no masses or organomegaly. No rebound, guarding, rigidity noted. Extremities: no cyanosis, no clubbing 1+ edema BLE  Neurologic: no cranial nerve deficit and speech normal    Recent Labs     05/22/20  1900 05/23/20  0415 05/24/20  0350    141 135   K 3.8 4.1 4.0    105 102   CO2 23 25 28   BUN 53* 56* 36*   CREATININE 6.6* 7.4* 5.9*   GLUCOSE 113* 108* 110*   CALCIUM 8.1* 8.1* 8.2*       Recent Labs     05/22/20  0550   ALKPHOS 57   PROT 5.2*   LABALBU 2.6*   BILITOT <0.2   AST 17   ALT 18       Recent Labs     05/22/20  0550 05/23/20  0415 05/24/20  0350   WBC 13.1* 13.2* 10.3   RBC 3.61 3.81 3.27*   HGB 10.4* 10.7* 9.4*   HCT 31.7* 33.1* 29.1*   MCV 87.8 86.9 89.0   MCH 28.8 28.1 28.7   MCHC 32.8 32.3 32.3   RDW 14.6 14.9 14.8    207 201   MPV 10.5 10.2 10.1         Radiology:   XR CHEST PORTABLE   Final Result   Vague ill-defined groundglass densities are seen towards   the mid lower aspect of both lungs with some pleural reaction. The can   consider reevaluation with a CT scan of the chest due to the presence   of a previous cavitating lesions are. There is a pattern of chronic   bronchiectasis of the right middle lobe. Heart has normal size. Right-sided PICC line tip in SVC. IMPRESSION:    1. Persistent groundglass density vague and ill-defined towards the   mid lower aspect of both lungs with some degree of pleural reaction. 2. Further evaluation with a CT scan chest recommended. XR CHEST PORTABLE   Final Result      Bibasilar opacities notable on the right could suggest pneumonia   and/or atelectasis. CT ABDOMEN PELVIS WO CONTRAST Additional Contrast? None   Final Result      1. Mesenteric vein and to a lesser extent retroperitoneal edema, the   etiology of which is unclear. It may be due to patient's history of   renal failure. Other etiologies cannot be excluded.     2.  Mild bilateral perinephric stranding, which medical renal disease. No urolithiasis or hydronephrosis. 3.  Clustered nodules in the lung bases, right greater than left. Scarring in the right middle and lower lobes. Findings are likely   consistent with patient's history of mycobacterium avium complex   infection. Assessment:  Active Problems:    Mycobacterium avium complex (HCC)    Acute renal failure (ARF) (HCC)    Intractable nausea and vomiting    Acidosis    Leukocytosis  Resolved Problems:    * No resolved hospital problems. *      Plan:  1. MATTEO with Hyperkalemia-2/2 N/V/D? Urine output 1175 last 24 hours. 5/23/2020 HD output 2300. Continues to improve with HD.  BUN/Crt 36/5.9  received treatment today? Avoid nephrotoxic agents.  Nephrology input appreciated. 2.N/V/D- significantly improved this morning. Patient was able to eat eggs/peaches for breakfast.  Continues with intermittent nausea. With 3 normal bowel movements yesterday. Continue antiemetics/PPI.   Consider GI consultation. 3.Leukocytosis-resolved this morning. Likely reactive. Patient remains afebrile WBC 17.6>14.6> 13.1>13.2>10.3. Respiratory panel negative. 4. Hx COPD-currently not in exacerbation. Continues on room air tolerating well SPO2 93%.  Pulmonary input appreciated. 5. Hx Mycobacterium Avium Complex-patient follows with infectious disease outpatient.  Previously treated in 2016 with rifampin/clarithromycin/ethambutol.  Patient stopped taking treatment because she could not tolerate medications.  Earlier this month patient was started on Arikayce. Patient refusing further treatment for MAC. Continue supportive care infectious disease input appreciated. NOTE: This report was transcribed using voice recognition software. Every effort was made to ensure accuracy; however, inadvertent computerized transcription errors may be present. Electronically signed by Daysi Merida CNP on 5/24/2020 at 8:25 AM

## 2020-05-24 NOTE — PROGRESS NOTES
Pulmonary [address] [facility]                             Pulmonary Consult/Progress Note :              Reason for Consultation:history of MAC ,  CC : cough ,worsen SOB  HPI:   Doing about the same  Cough   SOB   LYN       PHYSICAL EXAMINATION:     VITAL SIGNS:  BP (!) 134/55   Pulse 76   Temp 98.9 °F (37.2 °C)   Resp 20   Ht 5' 2\" (1.575 m)   Wt 125 lb 3.5 oz (56.8 kg)   SpO2 97%   BMI 22.90 kg/m²   Wt Readings from Last 3 Encounters:   05/23/20 125 lb 3.5 oz (56.8 kg)   02/17/20 109 lb (49.4 kg)   01/01/20 106 lb 3.2 oz (48.2 kg)     Temp Readings from Last 3 Encounters:   05/23/20 98.9 °F (37.2 °C)   04/30/20 97.6 °F (36.4 °C) (Oral)   03/19/20 97.5 °F (36.4 °C)     TMAX:  BP Readings from Last 3 Encounters:   05/23/20 (!) 134/55   02/27/20 104/60   02/17/20 (!) 101/53     Pulse Readings from Last 3 Encounters:   05/23/20 76   02/27/20 84   02/17/20 69           INTAKE/OUTPUTS:  I/O last 3 completed shifts:   In: 1447.3 [I.V.:1147.3]  Out: 3250 [Urine:650]    Intake/Output Summary (Last 24 hours) at 5/23/2020 2234  Last data filed at 5/23/2020 2200  Gross per 24 hour   Intake 793 ml   Output 3650 ml   Net -2857 ml       General Appearance: alert and oriented to person, place and time, well-developed and   well-nourished, in no acute distress   Eyes: pupils equal, round, and reactive to light, extraocular eye movements intact, conjunctivae normal and sclera anicteric   Neck: neck supple and non tender without mass, no thyromegaly, no thyroid nodules and no cervical adenopathy   Pulmonary/Chest:Rhonchi   Cardiovascular: normal rate, regular rhythm, normal S1 and S2, no murmurs, rubs, clicks or gallops, distal pulses intact, no carotid bruits, no murmurs, no gallops, no carotid bruits and no JVD   Abdomen: obese, soft, non-tender, non-distended, normal bowel sounds, no masses or organomegaly   Extremities:No edema   Musculoskeletal: normal range of motion, no joint organism    Hyperglycemia    Mycobacteriosis    Acute renal failure (ARF) (HCC)    Intractable nausea and vomiting    Hyperkalemia    Acidosis    Leukocytosis               ASSESSMENT:  1.) MAC pneumonia   2. )MATTEO   3.)Cough   4. )Malnutrtion        PLAN:  *-will do CXR   *_Not interested in MAC treatement ,will discuss with ID   *-IVF as per renal ,she is making urine so hope she can avoid HD   *-abx per ID   *-BD   *_no need for sputum culture at this time   *- will follow along           Thank you very much for allowing me to participate in the care of this pleasant patient , should you have any questions ,please do not hesitate to contact me      Paramjit Callaway MD,[facility]  Pulmonary&Critical Care Medicine   Director of [address] [place] [address] Director of [address] [place] [address]    Bora Noble    NOTE: This report was transcribed using voice recognition software. Every effort was made to ensure accuracy; however, inadvertent computerized transcription errors may be present.

## 2020-05-24 NOTE — PROGRESS NOTES
2010 (!) 134/55 -- -- 76 -- 97 % --   05/23/20 1330 (!) 145/64 -- -- 84 20 98 % --   @      Intake/Output Summary (Last 24 hours) at 5/24/2020 1257  Last data filed at 5/24/2020 1150  Gross per 24 hour   Intake 733 ml   Output 1175 ml   Net -442 ml         Wt Readings from Last 3 Encounters:   05/24/20 125 lb (56.7 kg)   02/17/20 109 lb (49.4 kg)   01/01/20 106 lb 3.2 oz (48.2 kg)       Constitutional:  in no acute distress  Oral: mucus membranes moist  Neck: Soft supple, trachea midline, no JVD  Cardiovascular: S1, S2 regular rhythm, no murmur,or rub  Respiratory: Coarse breath sounds, few scattered crackles, no wheeze  Gastrointestinal:  Soft, nontender, nondistended, NABS  Ext: 1/2+ distal lower extremity edema, feet warm  Skin: dry, no rash  Neuro: awake, alert, interactive. Did not appreciate asterixis      DATA:    Recent Labs     05/22/20  0550 05/23/20  0415 05/24/20  0350   WBC 13.1* 13.2* 10.3   HGB 10.4* 10.7* 9.4*   HCT 31.7* 33.1* 29.1*   MCV 87.8 86.9 89.0    207 201     Recent Labs     05/22/20  0550 05/22/20  1900 05/23/20  0415 05/24/20  0350    137 141 135   K 5.0 3.8 4.1 4.0    101 105 102   CO2 20* 23 25 28   MG 2.2  --  1.9 2.0   PHOS 6.7*  --  5.0* 4.8*   * 53* 56* 36*   CREATININE 12.0* 6.6* 7.4* 5.9*   ALT 18  --   --   --    AST 17  --   --   --    BILITOT <0.2  --   --   --    ALKPHOS 57  --   --   --        Lab Results   Component Value Date    LABPROT 0.6 (H) 05/20/2020    LABPROT 0.6 05/20/2020         Assessment  1. Acute kidney injury, severe, oliguric, secondary to severe prerenal azotemia, possibly evolved into acute tubular necrosis due to nausea, vomiting, anorexia with very poor intake, watery diarrhea, with possible superimposed rifampin-induced AIN versus direct tubular toxicity. Status post IV normal saline bolus and IV fluid resuscitation, BUN and creatinine have improved slightly. Hyperkalemic, though mildly.   Metabolic acidosis, wide anion gap,

## 2020-05-24 NOTE — PROGRESS NOTES
Results   Component Value Date    NEUTROABS 13.10 (H) 05/20/2020    NEUTROABS 2.79 04/30/2020    NEUTROABS 3.56 02/28/2020     No results found for: CRP  Lab Results   Component Value Date    ALT 18 05/22/2020    AST 17 05/22/2020    ALKPHOS 57 05/22/2020    BILITOT <0.2 05/22/2020     Lab Results   Component Value Date     05/24/2020    K 4.0 05/24/2020    K 4.4 01/01/2020     05/24/2020    CO2 28 05/24/2020    BUN 36 05/24/2020    CREATININE 5.9 05/24/2020    CREATININE 7.4 05/23/2020    CREATININE 6.6 05/22/2020    GFRAA 9 05/24/2020    LABGLOM 7 05/24/2020    GLUCOSE 110 05/24/2020    PROT 5.2 05/22/2020    LABALBU 2.6 05/22/2020    CALCIUM 8.2 05/24/2020    BILITOT <0.2 05/22/2020    ALKPHOS 57 05/22/2020    AST 17 05/22/2020    ALT 18 05/22/2020     Lab Results   Component Value Date    CRP 3.2 (H) 05/22/2020    CRP 0.4 04/30/2020    CRP 0.6 (H) 02/28/2020     Lab Results   Component Value Date    SEDRATE 45 (H) 05/22/2020    SEDRATE 55 (H) 04/30/2020    SEDRATE 41 (H) 02/28/2020     Radiology:  CXR   1. Persistent groundglass density vague and ill-defined towards the   mid lower aspect of both lungs with some degree of pleural reaction.       2. Further evaluation with a CT scan chest recommended. Reviewed and compared films    Microbiology:   Resp viral panel neg    ASSESSMENT:  · Recurrent MAC infection. Previously treated in 2016 with Rifampin, Clarithromycin, Ethambutol. Patient stopped treatment because she could not tolerate medications. She thought Clarithromycin had made her sick. She now thinks it was most likely Rifampin. She started Arikayce earlier this month. She also began taking Rifampin and Ethambutol 1 week prior to the admission. He became extremely weak and stopped eating and drinking. She was having aches and was taking naproxen.     · Acute kidney injury, no need for dialysis  · Leukocytosis secondary to MATTEO    PLAN:  · Continue supportive treatment  · The patient

## 2020-05-25 LAB
ANION GAP SERPL CALCULATED.3IONS-SCNC: 8 MMOL/L (ref 7–16)
ANION GAP SERPL CALCULATED.3IONS-SCNC: 9 MMOL/L (ref 7–16)
BUN BLDV-MCNC: 23 MG/DL (ref 8–23)
BUN BLDV-MCNC: 45 MG/DL (ref 8–23)
CALCIUM SERPL-MCNC: 7.9 MG/DL (ref 8.6–10.2)
CALCIUM SERPL-MCNC: 8.1 MG/DL (ref 8.6–10.2)
CHLORIDE BLD-SCNC: 101 MMOL/L (ref 98–107)
CHLORIDE BLD-SCNC: 98 MMOL/L (ref 98–107)
CO2: 28 MMOL/L (ref 22–29)
CO2: 29 MMOL/L (ref 22–29)
CREAT SERPL-MCNC: 4 MG/DL (ref 0.5–1)
CREAT SERPL-MCNC: 6.5 MG/DL (ref 0.5–1)
GFR AFRICAN AMERICAN: 14
GFR AFRICAN AMERICAN: 8
GFR NON-AFRICAN AMERICAN: 11 ML/MIN/1.73
GFR NON-AFRICAN AMERICAN: 6 ML/MIN/1.73
GLUCOSE BLD-MCNC: 102 MG/DL (ref 74–99)
GLUCOSE BLD-MCNC: 191 MG/DL (ref 74–99)
HCT VFR BLD CALC: 30.3 % (ref 34–48)
HEMOGLOBIN: 9.7 G/DL (ref 11.5–15.5)
MCH RBC QN AUTO: 28.5 PG (ref 26–35)
MCHC RBC AUTO-ENTMCNC: 32 % (ref 32–34.5)
MCV RBC AUTO: 89.1 FL (ref 80–99.9)
PDW BLD-RTO: 14.6 FL (ref 11.5–15)
PLATELET # BLD: 259 E9/L (ref 130–450)
PMV BLD AUTO: 9.9 FL (ref 7–12)
POTASSIUM SERPL-SCNC: 3.6 MMOL/L (ref 3.5–5)
POTASSIUM SERPL-SCNC: 3.8 MMOL/L (ref 3.5–5)
RBC # BLD: 3.4 E12/L (ref 3.5–5.5)
SODIUM BLD-SCNC: 135 MMOL/L (ref 132–146)
SODIUM BLD-SCNC: 138 MMOL/L (ref 132–146)
WBC # BLD: 12.4 E9/L (ref 4.5–11.5)

## 2020-05-25 PROCEDURE — 99232 SBSQ HOSP IP/OBS MODERATE 35: CPT | Performed by: INTERNAL MEDICINE

## 2020-05-25 PROCEDURE — 90935 HEMODIALYSIS ONE EVALUATION: CPT

## 2020-05-25 PROCEDURE — 6360000002 HC RX W HCPCS: Performed by: NURSE PRACTITIONER

## 2020-05-25 PROCEDURE — 99223 1ST HOSP IP/OBS HIGH 75: CPT | Performed by: SURGERY

## 2020-05-25 PROCEDURE — 6360000002 HC RX W HCPCS: Performed by: INTERNAL MEDICINE

## 2020-05-25 PROCEDURE — 36415 COLL VENOUS BLD VENIPUNCTURE: CPT

## 2020-05-25 PROCEDURE — APPSS30 APP SPLIT SHARED TIME 16-30 MINUTES: Performed by: NURSE PRACTITIONER

## 2020-05-25 PROCEDURE — 2700000000 HC OXYGEN THERAPY PER DAY

## 2020-05-25 PROCEDURE — 6370000000 HC RX 637 (ALT 250 FOR IP): Performed by: NURSE PRACTITIONER

## 2020-05-25 PROCEDURE — 2580000003 HC RX 258: Performed by: NURSE PRACTITIONER

## 2020-05-25 PROCEDURE — 85027 COMPLETE CBC AUTOMATED: CPT

## 2020-05-25 PROCEDURE — C9113 INJ PANTOPRAZOLE SODIUM, VIA: HCPCS | Performed by: NURSE PRACTITIONER

## 2020-05-25 PROCEDURE — 2060000000 HC ICU INTERMEDIATE R&B

## 2020-05-25 PROCEDURE — 80048 BASIC METABOLIC PNL TOTAL CA: CPT

## 2020-05-25 RX ADMIN — MECLIZINE 25 MG: 12.5 TABLET ORAL at 00:47

## 2020-05-25 RX ADMIN — Medication 10 ML: at 20:13

## 2020-05-25 RX ADMIN — MECLIZINE 25 MG: 12.5 TABLET ORAL at 20:33

## 2020-05-25 RX ADMIN — HEPARIN SODIUM 5000 UNITS: 10000 INJECTION, SOLUTION INTRAVENOUS; SUBCUTANEOUS at 14:00

## 2020-05-25 RX ADMIN — PANTOPRAZOLE SODIUM 40 MG: 40 INJECTION, POWDER, FOR SOLUTION INTRAVENOUS at 08:11

## 2020-05-25 RX ADMIN — SODIUM CHLORIDE, PRESERVATIVE FREE 300 UNITS: 5 INJECTION INTRAVENOUS at 08:15

## 2020-05-25 RX ADMIN — ACETAMINOPHEN 650 MG: 325 TABLET ORAL at 20:33

## 2020-05-25 RX ADMIN — SODIUM CHLORIDE, PRESERVATIVE FREE 300 UNITS: 5 INJECTION INTRAVENOUS at 20:13

## 2020-05-25 RX ADMIN — HEPARIN SODIUM 5000 UNITS: 10000 INJECTION, SOLUTION INTRAVENOUS; SUBCUTANEOUS at 06:02

## 2020-05-25 RX ADMIN — MECLIZINE 25 MG: 12.5 TABLET ORAL at 10:55

## 2020-05-25 RX ADMIN — HEPARIN SODIUM 5000 UNITS: 10000 INJECTION, SOLUTION INTRAVENOUS; SUBCUTANEOUS at 22:24

## 2020-05-25 RX ADMIN — Medication 10 ML: at 08:12

## 2020-05-25 ASSESSMENT — PAIN SCALES - GENERAL
PAINLEVEL_OUTOF10: 0
PAINLEVEL_OUTOF10: 8
PAINLEVEL_OUTOF10: 0

## 2020-05-25 NOTE — PROGRESS NOTES
have improved slightly. Hyperkalemic, though mildly. Metabolic acidosis, wide anion gap, also mild at this point. 2. Hyperkalemia secondary acute kidney injury, decreased effective circulating volume, improving with treatment  3. Metabolic acidosis, wide anion gap, secondary to acute kidney injury, exacerbated by diarrhea  4.  MAC treated with rifampin, ethambutol, amikacin       Plan :   -good UO though creatinine continue to trend up at anephric rate   -continue to provide HD support till azotemia level off (hopefully soon and will be able to avoid Tesio )         Electronically signed by Kenneth Childers MD on 5/25/2020

## 2020-05-25 NOTE — CONSULTS
Diamond from Lab called with critical result of Ca+ 6.7 at 0720. Critical lab result read back to Diamond.   Dr. Hammonds notified of critical lab result at 0730.  Critical lab result read back by Dr. Hammonds.   Vascular Surgery Inpatient Consultation Note      Reason for Consultation: Hemodialysis access    HISTORY OF PRESENT ILLNESS:                The patient is a 60 y.o. female who is admitted to the hospital for treatment of weakness in diarrhea. She complained of fevers and also episodes of vomiting. She also states a history of anorexia and decreased p.o. intake. She was found to be in acute renal insufficiency requiring hemodialysis. Vascular surgery is consulted for evaluation and treatment. The patient states that she has had renal insufficiency for some time. IMPRESSION: Acute kidney injury requiring hemodialysis. A right femoral venous catheter has been inserted. The catheter can be used as needed for dialysis. Please call if a tunneled catheter is required. I reviewed the procedure of insertion of tunneled catheter with the patient in case this is needed. I reviewed the risk, benefits, and alternatives. She understands and agrees.     Patient Active Problem List   Diagnosis Code    Organizing pneumonia ([facility] J84.89    Mycobacterium avium complex ([facility] A31.0    Bronchiectasis ([facility] J47.9    COPD (chronic obstructive pulmonary disease) ([facility] [facility] J44.9    Pneumonia due to organism J18.9    Hyperglycemia R73.9    Mycobacteriosis A31.9    Acute renal failure (ARF) (HCC) N17.9    Intractable nausea and vomiting R11.2    Hyperkalemia E87.5    Acidosis E87.2    Leukocytosis D72.829    Vertigo R42    Intractable vomiting R11.10       Past Medical History:   Diagnosis Date    Bronchiectasis ([facility]     COPD (chronic obstructive pulmonary disease) (HCC)     Mycobacterium avium complex ([facility]         Past Surgical History:   Procedure Laterality Date    BRONCHOSCOPY N/A 12/30/2019    BRONCHOSCOPY ALVEOLAR LAVAGE performed by Maria D Colby MD at [facility] ENDOSCOPY       Current Medications:      meclizine, trimethobenzamide, sodium chloride flush, heparin flush, sodium chloride flush, cranial nerve deficit, speech normal  Head: normocephalic and atraumatic  Eyes: extraocular eye movements intact, conjunctivae normal  ENT: external ear and ear canal normal bilaterally, nose without deformity  Pulmonary/Chest: normal air movement, no respiratory distress  Cardiovascular: normal rate, regular rhythm  Abdomen: non-distended, no masses  Musculoskeletal: no joint deformity or tenderness  Extremities: no leg edema bilaterally, right groin catheter without swelling or bleeding.   Skin: warm and dry, no rash or erythema    PULSE EXAM      Right      Left   Brachial     Radial     Femoral     Popliteal     Dorsalis Pedis     Posterior Tibial     (3=normal, 2=diminished, 1=barely palpable, 4=widened)      LABS:    Lab Results   Component Value Date    WBC 12.4 (H) 05/25/2020    HGB 9.7 (L) 05/25/2020    HCT 30.3 (L) 05/25/2020     05/25/2020    PROTIME 12.6 (H) 12/28/2019    INR 1.1 12/28/2019    K 3.8 05/25/2020    BUN 45 (H) 05/25/2020    CREATININE 6.5 (H) 05/25/2020       RADIOLOGY:

## 2020-05-25 NOTE — PROGRESS NOTES
lower lobes. Findings are likely   consistent with patient's history of mycobacterium avium complex   infection. Assessment:    Active Problems:    Mycobacterium avium complex (HCC)    Acute renal failure (ARF) (HCC)    Intractable nausea and vomiting    Acidosis    Leukocytosis    Vertigo    Intractable vomiting  Resolved Problems:    * No resolved hospital problems. *      Plan:  1. Mycobacterium Avium Complex - coughing - c/o chills but is afebrile - ID following - pulmonary following - MAC medications have been on hold     2. Acute renal failure - most likely secondary medication resulting in azotemia - nephrology following - HD prn evaluated daily - HD currently today being held - BUN/creatinine elevated - urine out put +699 overall - will continue monitor     3. Leukocytosis - afebrile - dry cough     4. Vertigo - with weakness - will consult PT/OT     5. Anemia of Chronic Disease - H&H stable     6. COVID19 - checked - negative     7. Intractable vomiting - clinically resolved        Electronically signed by ALTHEA Do CNP on 5/25/2020 at 10:00 AM   HOSPITALIST ATTENDING PHYSICIAN NOTE 5/25/2020 2125PM:    Details of the evaluation - subjective assessment (including medication profile, past medical, family and social history when applicable), examination, review of lab and test data, diagnostic impressions and medical decision making - performed by ALTHEA Do CNP, were discussed with me on the date of service and I agree with clinical information herein unless otherwise noted. The patient has been evaluated by me personally earlier today.   Pt reports no fevers, chills,n/v.     Exam: heart reg at rate of 84,lungs cta, abd pos bs soft nt, ext neg for le edema     I agree with the assessment and plan of ALTHEA Do CNP    evelia  acidosis  Hyperkalemia  N/v  hyperphosphatemia  Leukocytosis  Copd  Recurrent MAC infection/pneumonia    Electronically signed by

## 2020-05-25 NOTE — PROGRESS NOTES
[address] [place] [address] [facility]  Progress Note    SUBJECTIVE:  Chief Complaint   Patient presents with    Nausea    Emesis     since     Fatigue    Headache     Patient is actually feeling better. She has not had dialysis yesterday or today. She does have some dry cough. No nausea or vomiting. Admits to being weak. Review of systems:  As stated above in the chief complaint, otherwise negative. Medications:  Scheduled Meds:   heparin flush  3 mL Intravenous 2 times per day    sodium chloride flush  10 mL Intravenous 2 times per day    heparin (porcine)  5,000 Units Subcutaneous 3 times per day    pantoprazole  40 mg Intravenous Daily     Continuous Infusions:    PRN Meds:meclizine, trimethobenzamide, sodium chloride flush, heparin flush, sodium chloride flush, acetaminophen **OR** acetaminophen, polyethylene glycol, promethazine **OR** ondansetron    OBJECTIVE:  /62   Pulse 80   Temp 98.2 °F (36.8 °C) (Temporal)   Resp 16   Ht 5' 2\" (1.575 m)   Wt 125 lb 9.6 oz (57 kg)   SpO2 97%   BMI 22.97 kg/m²   Temp  Av.4 °F (36.9 °C)  Min: 98.2 °F (36.8 °C)  Max: 98.6 °F (37 °C)  Constitutional: The patient is lying in bed. She is awake, alert. Looks somewhat fatigued. Cooperative and pleasant. Skin: No rash. HEENT: Round and reactive pupils. Moist mucous membranes. No ulcerations or thrush. Neck: Supple to movements. Chest: Good breath sounds. No crackles. Cardiovascular: Heart sounds rhythmic and regular. Abdomen: Positive bowel sounds to auscultation. Benign to palpation. Extremities: Bilateral minimal lower extremity edema. Lines: Right femoral temporary HD catheter 2020.     Laboratory and Tests Review:  Lab Results   Component Value Date    WBC 12.4 (H) 2020    WBC 10.3 2020    WBC 13.2 (H) 2020    HGB 9.7 (L) 2020    HCT 30.3 (L) 2020    MCV 89.1 2020     2020     Lab Results   Component Value

## 2020-05-26 LAB
ANION GAP SERPL CALCULATED.3IONS-SCNC: 6 MMOL/L (ref 7–16)
BUN BLDV-MCNC: 26 MG/DL (ref 8–23)
CALCIUM SERPL-MCNC: 8.2 MG/DL (ref 8.6–10.2)
CHLORIDE BLD-SCNC: 101 MMOL/L (ref 98–107)
CO2: 29 MMOL/L (ref 22–29)
CREAT SERPL-MCNC: 4.2 MG/DL (ref 0.5–1)
FOLATE: 5.5 NG/ML (ref 4.8–24.2)
GFR AFRICAN AMERICAN: 13
GFR NON-AFRICAN AMERICAN: 11 ML/MIN/1.73
GLUCOSE BLD-MCNC: 110 MG/DL (ref 74–99)
HAV IGM SER IA-ACNC: NORMAL
HBV SURFACE AB TITR SER: REACTIVE {TITER}
HCT VFR BLD CALC: 30 % (ref 34–48)
HEMOGLOBIN: 9.4 G/DL (ref 11.5–15.5)
HEPATITIS B CORE IGM ANTIBODY: NORMAL
HEPATITIS B SURFACE ANTIGEN INTERPRETATION: NORMAL
HEPATITIS C ANTIBODY INTERPRETATION: NORMAL
MCH RBC QN AUTO: 27.9 PG (ref 26–35)
MCHC RBC AUTO-ENTMCNC: 31.3 % (ref 32–34.5)
MCV RBC AUTO: 89 FL (ref 80–99.9)
PDW BLD-RTO: 14.5 FL (ref 11.5–15)
PLATELET # BLD: 260 E9/L (ref 130–450)
PMV BLD AUTO: 9.8 FL (ref 7–12)
POTASSIUM SERPL-SCNC: 3.6 MMOL/L (ref 3.5–5)
RBC # BLD: 3.37 E12/L (ref 3.5–5.5)
SODIUM BLD-SCNC: 136 MMOL/L (ref 132–146)
VITAMIN B-12: 1320 PG/ML (ref 211–946)
WBC # BLD: 12.7 E9/L (ref 4.5–11.5)

## 2020-05-26 PROCEDURE — 6360000002 HC RX W HCPCS: Performed by: INTERNAL MEDICINE

## 2020-05-26 PROCEDURE — APPSS30 APP SPLIT SHARED TIME 16-30 MINUTES: Performed by: NURSE PRACTITIONER

## 2020-05-26 PROCEDURE — 80048 BASIC METABOLIC PNL TOTAL CA: CPT

## 2020-05-26 PROCEDURE — 36415 COLL VENOUS BLD VENIPUNCTURE: CPT

## 2020-05-26 PROCEDURE — 6360000002 HC RX W HCPCS: Performed by: NURSE PRACTITIONER

## 2020-05-26 PROCEDURE — 84425 ASSAY OF VITAMIN B-1: CPT

## 2020-05-26 PROCEDURE — 85027 COMPLETE CBC AUTOMATED: CPT

## 2020-05-26 PROCEDURE — 82607 VITAMIN B-12: CPT

## 2020-05-26 PROCEDURE — 2700000000 HC OXYGEN THERAPY PER DAY

## 2020-05-26 PROCEDURE — C9113 INJ PANTOPRAZOLE SODIUM, VIA: HCPCS | Performed by: NURSE PRACTITIONER

## 2020-05-26 PROCEDURE — 2580000003 HC RX 258: Performed by: NURSE PRACTITIONER

## 2020-05-26 PROCEDURE — 2060000000 HC ICU INTERMEDIATE R&B

## 2020-05-26 PROCEDURE — 6370000000 HC RX 637 (ALT 250 FOR IP): Performed by: NURSE PRACTITIONER

## 2020-05-26 PROCEDURE — 99231 SBSQ HOSP IP/OBS SF/LOW 25: CPT | Performed by: INTERNAL MEDICINE

## 2020-05-26 PROCEDURE — 82746 ASSAY OF FOLIC ACID SERUM: CPT

## 2020-05-26 PROCEDURE — 97165 OT EVAL LOW COMPLEX 30 MIN: CPT

## 2020-05-26 PROCEDURE — 99232 SBSQ HOSP IP/OBS MODERATE 35: CPT | Performed by: INTERNAL MEDICINE

## 2020-05-26 RX ADMIN — MECLIZINE 25 MG: 12.5 TABLET ORAL at 18:02

## 2020-05-26 RX ADMIN — Medication 10 ML: at 08:39

## 2020-05-26 RX ADMIN — PANTOPRAZOLE SODIUM 40 MG: 40 INJECTION, POWDER, FOR SOLUTION INTRAVENOUS at 08:39

## 2020-05-26 RX ADMIN — SODIUM CHLORIDE, PRESERVATIVE FREE 300 UNITS: 5 INJECTION INTRAVENOUS at 19:50

## 2020-05-26 RX ADMIN — MECLIZINE 25 MG: 12.5 TABLET ORAL at 06:53

## 2020-05-26 RX ADMIN — HEPARIN SODIUM 5000 UNITS: 10000 INJECTION, SOLUTION INTRAVENOUS; SUBCUTANEOUS at 21:27

## 2020-05-26 RX ADMIN — HEPARIN SODIUM 5000 UNITS: 10000 INJECTION, SOLUTION INTRAVENOUS; SUBCUTANEOUS at 12:57

## 2020-05-26 RX ADMIN — Medication 10 ML: at 19:55

## 2020-05-26 RX ADMIN — HEPARIN SODIUM 5000 UNITS: 10000 INJECTION, SOLUTION INTRAVENOUS; SUBCUTANEOUS at 05:54

## 2020-05-26 RX ADMIN — SODIUM CHLORIDE, PRESERVATIVE FREE 300 UNITS: 5 INJECTION INTRAVENOUS at 08:40

## 2020-05-26 RX ADMIN — ACETAMINOPHEN 650 MG: 325 TABLET ORAL at 19:55

## 2020-05-26 ASSESSMENT — PAIN SCALES - GENERAL
PAINLEVEL_OUTOF10: 0

## 2020-05-26 NOTE — PROGRESS NOTES
[address] [place] [address] [facility]  Progress Note    SUBJECTIVE:  Chief Complaint   Patient presents with    Nausea    Emesis     since     Fatigue    Headache     The patient is not feeling well today. She says that dialysis is causing her to not sleep very well. No nausea or vomiting. No dyspnea. No cough. No fevers. Review of systems:  As stated above in the chief complaint, otherwise negative. Medications:  Scheduled Meds:   heparin flush  3 mL Intravenous 2 times per day    sodium chloride flush  10 mL Intravenous 2 times per day    heparin (porcine)  5,000 Units Subcutaneous 3 times per day    pantoprazole  40 mg Intravenous Daily     Continuous Infusions:    PRN Meds:meclizine, trimethobenzamide, sodium chloride flush, heparin flush, sodium chloride flush, acetaminophen **OR** acetaminophen, polyethylene glycol, promethazine **OR** ondansetron    OBJECTIVE:  /64   Pulse 88   Temp 98.2 °F (36.8 °C) (Oral)   Resp 18   Ht 5' 2\" (1.575 m)   Wt 120 lb (54.4 kg)   SpO2 92%   BMI 21.95 kg/m²   Temp  Av.4 °F (36.9 °C)  Min: 98 °F (36.7 °C)  Max: 99 °F (37.2 °C)  Constitutional: The patient is lying in bed. She is awake, alert and somewhat anxious. Skin: No rash. HEENT: Round and reactive pupils. Moist mucous membranes. No ulcerations or thrush. Neck: Supple to movements. Chest: Good breath sounds. No crackles. Cardiovascular: Heart sounds rhythmic and regular. Abdomen: Positive bowel sounds to auscultation. Benign to palpation. Extremities: Bilateral minimal lower extremity edema. Lines: Right femoral temporary HD catheter 2020.     Laboratory and Tests Review:  Lab Results   Component Value Date    WBC 12.7 (H) 2020    WBC 12.4 (H) 2020    WBC 10.3 2020    HGB 9.4 (L) 2020    HCT 30.0 (L) 2020    MCV 89.0 2020     2020     Lab Results   Component Value Date    NEUTROABS 13.10 (H) 2020

## 2020-05-26 NOTE — PROGRESS NOTES
deformity and no tender joints  Neurologic: no cranial nerve deficit and speech normal        Recent Labs     05/25/20  0600 05/25/20 2015 05/26/20  0550    135 136   K 3.8 3.6 3.6    98 101   CO2 28 29 29   BUN 45* 23 26*   CREATININE 6.5* 4.0* 4.2*   GLUCOSE 102* 191* 110*   CALCIUM 8.1* 7.9* 8.2*       Recent Labs     05/24/20  0350 05/25/20  0600 05/26/20  0550   WBC 10.3 12.4* 12.7*   RBC 3.27* 3.40* 3.37*   HGB 9.4* 9.7* 9.4*   HCT 29.1* 30.3* 30.0*   MCV 89.0 89.1 89.0   MCH 28.7 28.5 27.9   MCHC 32.3 32.0 31.3*   RDW 14.8 14.6 14.5    259 260   MPV 10.1 9.9 9.8           Assessment:    Active Problems:    Mycobacterium avium complex (HCC)    Acute renal failure (ARF) ([facility])    Intractable nausea and vomiting    Acidosis    Leukocytosis    Vertigo    Intractable vomiting    Pulmonary Mycobacterium avium complex (MAC) infection ([facility])  Resolved Problems:    * No resolved hospital problems. *      Plan:  1. Mycobacterium Avium Complex: Treated back in 2016 with Rifampin, Clarithromycin, Ethambutol. Patient stopped taking treatment because she could not tolerate medications.  Earlier this month patient was started on Arikayce. She is not interested in MAC treatment at this time. ID following. Supportive treatment. 2. MATTEO: Renal following. No need for dialysis today as renal function starting to improve. Continue to monitor kidney function closely. Monitor UO closely as starting to improve. 3. Leukocytosis: Secondary to MATTEO. WBC 12.7 today. Remains afebrile. Continue supportive treatment and monitor. 4. Vertigo: PT/OT following. Denies dizziness today. Up with assistance. 5. Anemia of chronic disease: Hemoglobin remains stable 9.4 today. Continue to monitor H&H closely. NOTE: This report was transcribed using voice recognition software. Every effort was made to ensure accuracy; however, inadvertent computerized transcription errors may be present.   Electronically signed by

## 2020-05-26 NOTE — PROGRESS NOTES
disease) ([facility]    Pneumonia due to organism    Hyperglycemia    Mycobacteriosis    Acute renal failure (ARF) (HCC)    Intractable nausea and vomiting    Hyperkalemia    Acidosis    Leukocytosis    Vertigo    Intractable vomiting               ASSESSMENT:  1.) MAC pneumonia   2. )MATTEO   3.)Cough   4. )Malnutrtion        PLAN:  *-CXR still showing of scaring ,chronic changes ,hard to say but since she does not want treatment ,then will hold on CT   *_Not interested in MAC treatement , discuss with ID ,will wait until we see her kidney function and if she will recover  *- HD as per renal   *-abx per ID   *-BD   *_no need for sputum culture at this time   *- will follow along   *-discussed with daughter  *all questions answered    Paramjit Callaway MD,[facility]  Pulmonary&Critical Care Medicine   Director of [address] [place] [address] Director of [address] [place] [address]    Trevor Rush    NOTE: This report was transcribed using voice recognition software. Every effort was made to ensure accuracy; however, inadvertent computerized transcription errors may be present.

## 2020-05-26 NOTE — PROGRESS NOTES
Occupational Therapy  OCCUPATIONAL THERAPY INITIAL EVALUATION      Date:2020  Patient Name: Karma Loya  MRN: [id number]  : 1955  Room: [address] [place] [address]    Referring Provider: Ras Ramirez DO  Evaluating OT: Birdie Le. Marek, OTR/L - [id number]    AM-PAC Daily Activity Raw Score:       Recommended Adaptive Equipment: Continue to assess. Diagnosis: Acute renal failure (ARF) (HCC) [N17.9]    Pertinent Medical History: COPD     Precautions: falls, bed alarm    Home Living: Patient lives alone in a third floor apartment (21 steps to enter); patient noted that the laundry facilities are on the same floor as her apartment. Bathroom Setup: tub shower (grab bar available, but no seat)    Prior Level of Function (PLOF): Per patient, she was independent with ADLs, independent with IADLs, and independent with functional mobility (without device) prior to this hospitalization. Patient noted that her daughter lives locally and can assist with shopping and other IADLs, as needed. 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 Pain Level: Patient denied experiencing pain. Cognition: Patient alert and oriented x3. WFL command follow demonstrated. Patient pleasant, cooperative, and motivated to return to home environment and PLOF.    Memory: WFL grossly   Sequencing: WFL   Problem Solving: WFL grossly   Judgement/Safety: WFL grossly    Functional Assessment:   Initial Eval Status  Date: 2020 Treatment Status  Date:  Short Term Goals  Treatment Frequency/Duration: 2-5x/week for 3-7 days PRN   Feeding Independent     Grooming SBA  Independent / Mod I  (seated/standing at sink)   UB Dressing Setup  Independent   LB Dressing SBA  Independent / Mod I - with use of AE, as needed/appropriate   Bathing SBA  Independent / Mod I - with use of AE/DME, as needed/appropriate   Toileting SBA  Independent / Mod I   Bed Mobility

## 2020-05-27 LAB
ANION GAP SERPL CALCULATED.3IONS-SCNC: 9 MMOL/L (ref 7–16)
BUN BLDV-MCNC: 29 MG/DL (ref 8–23)
CALCIUM SERPL-MCNC: 8.6 MG/DL (ref 8.6–10.2)
CHLORIDE BLD-SCNC: 101 MMOL/L (ref 98–107)
CO2: 28 MMOL/L (ref 22–29)
CREAT SERPL-MCNC: 4 MG/DL (ref 0.5–1)
GFR AFRICAN AMERICAN: 14
GFR NON-AFRICAN AMERICAN: 11 ML/MIN/1.73
GLUCOSE BLD-MCNC: 102 MG/DL (ref 74–99)
MAGNESIUM: 1.7 MG/DL (ref 1.6–2.6)
PHOSPHORUS: 3.7 MG/DL (ref 2.5–4.5)
POTASSIUM SERPL-SCNC: 3.4 MMOL/L (ref 3.5–5)
SODIUM BLD-SCNC: 138 MMOL/L (ref 132–146)

## 2020-05-27 PROCEDURE — 2060000000 HC ICU INTERMEDIATE R&B

## 2020-05-27 PROCEDURE — 6370000000 HC RX 637 (ALT 250 FOR IP): Performed by: INTERNAL MEDICINE

## 2020-05-27 PROCEDURE — APPSS30 APP SPLIT SHARED TIME 16-30 MINUTES: Performed by: NURSE PRACTITIONER

## 2020-05-27 PROCEDURE — 6360000002 HC RX W HCPCS: Performed by: NURSE PRACTITIONER

## 2020-05-27 PROCEDURE — 6360000002 HC RX W HCPCS: Performed by: INTERNAL MEDICINE

## 2020-05-27 PROCEDURE — 2580000003 HC RX 258: Performed by: NURSE PRACTITIONER

## 2020-05-27 PROCEDURE — 36415 COLL VENOUS BLD VENIPUNCTURE: CPT

## 2020-05-27 PROCEDURE — 99232 SBSQ HOSP IP/OBS MODERATE 35: CPT | Performed by: INTERNAL MEDICINE

## 2020-05-27 PROCEDURE — 6370000000 HC RX 637 (ALT 250 FOR IP): Performed by: NURSE PRACTITIONER

## 2020-05-27 PROCEDURE — 84100 ASSAY OF PHOSPHORUS: CPT

## 2020-05-27 PROCEDURE — 97530 THERAPEUTIC ACTIVITIES: CPT

## 2020-05-27 PROCEDURE — 97535 SELF CARE MNGMENT TRAINING: CPT

## 2020-05-27 PROCEDURE — 99231 SBSQ HOSP IP/OBS SF/LOW 25: CPT | Performed by: INTERNAL MEDICINE

## 2020-05-27 PROCEDURE — C9113 INJ PANTOPRAZOLE SODIUM, VIA: HCPCS | Performed by: NURSE PRACTITIONER

## 2020-05-27 PROCEDURE — 80048 BASIC METABOLIC PNL TOTAL CA: CPT

## 2020-05-27 PROCEDURE — 83735 ASSAY OF MAGNESIUM: CPT

## 2020-05-27 RX ORDER — POTASSIUM CHLORIDE 20 MEQ/1
20 TABLET, EXTENDED RELEASE ORAL ONCE
Status: COMPLETED | OUTPATIENT
Start: 2020-05-27 | End: 2020-05-27

## 2020-05-27 RX ADMIN — SODIUM CHLORIDE, PRESERVATIVE FREE 300 UNITS: 5 INJECTION INTRAVENOUS at 20:56

## 2020-05-27 RX ADMIN — SODIUM CHLORIDE, PRESERVATIVE FREE 300 UNITS: 5 INJECTION INTRAVENOUS at 09:59

## 2020-05-27 RX ADMIN — HEPARIN SODIUM 5000 UNITS: 10000 INJECTION, SOLUTION INTRAVENOUS; SUBCUTANEOUS at 14:33

## 2020-05-27 RX ADMIN — HEPARIN SODIUM 5000 UNITS: 10000 INJECTION, SOLUTION INTRAVENOUS; SUBCUTANEOUS at 05:18

## 2020-05-27 RX ADMIN — MECLIZINE 25 MG: 12.5 TABLET ORAL at 23:09

## 2020-05-27 RX ADMIN — MECLIZINE 25 MG: 12.5 TABLET ORAL at 19:03

## 2020-05-27 RX ADMIN — MECLIZINE 25 MG: 12.5 TABLET ORAL at 08:34

## 2020-05-27 RX ADMIN — ACETAMINOPHEN 650 MG: 325 TABLET ORAL at 19:00

## 2020-05-27 RX ADMIN — POTASSIUM CHLORIDE 20 MEQ: 20 TABLET, EXTENDED RELEASE ORAL at 23:07

## 2020-05-27 RX ADMIN — HEPARIN SODIUM 5000 UNITS: 10000 INJECTION, SOLUTION INTRAVENOUS; SUBCUTANEOUS at 20:56

## 2020-05-27 RX ADMIN — PANTOPRAZOLE SODIUM 40 MG: 40 INJECTION, POWDER, FOR SOLUTION INTRAVENOUS at 09:58

## 2020-05-27 RX ADMIN — Medication 10 ML: at 20:56

## 2020-05-27 RX ADMIN — Medication 10 ML: at 09:59

## 2020-05-27 ASSESSMENT — PAIN DESCRIPTION - ONSET: ONSET: ON-GOING

## 2020-05-27 ASSESSMENT — PAIN SCALES - GENERAL
PAINLEVEL_OUTOF10: 0
PAINLEVEL_OUTOF10: 0
PAINLEVEL_OUTOF10: 3
PAINLEVEL_OUTOF10: 0

## 2020-05-27 ASSESSMENT — PAIN DESCRIPTION - DESCRIPTORS: DESCRIPTORS: HEADACHE

## 2020-05-27 ASSESSMENT — PAIN DESCRIPTION - PAIN TYPE: TYPE: ACUTE PAIN

## 2020-05-27 ASSESSMENT — PAIN DESCRIPTION - FREQUENCY: FREQUENCY: CONTINUOUS

## 2020-05-27 ASSESSMENT — PAIN DESCRIPTION - LOCATION: LOCATION: HEAD

## 2020-05-27 ASSESSMENT — PAIN - FUNCTIONAL ASSESSMENT: PAIN_FUNCTIONAL_ASSESSMENT: PREVENTS OR INTERFERES SOME ACTIVE ACTIVITIES AND ADLS

## 2020-05-27 ASSESSMENT — PAIN DESCRIPTION - PROGRESSION: CLINICAL_PROGRESSION: NOT CHANGED

## 2020-05-27 NOTE — PROGRESS NOTES
[address] [place] [address] [facility]  Progress Note    SUBJECTIVE:  Chief Complaint   Patient presents with    Nausea    Emesis     since     Fatigue    Headache     Not feeling well today. Tired and fatigued. Aches and pains. No appetite but forcing herself to eat. Minimal cough. Minimally productive. Review of systems:  As stated above in the chief complaint, otherwise negative. Medications:  Scheduled Meds:   heparin flush  3 mL Intravenous 2 times per day    sodium chloride flush  10 mL Intravenous 2 times per day    heparin (porcine)  5,000 Units Subcutaneous 3 times per day    pantoprazole  40 mg Intravenous Daily     Continuous Infusions:    PRN Meds:meclizine, trimethobenzamide, sodium chloride flush, heparin flush, sodium chloride flush, acetaminophen **OR** acetaminophen, polyethylene glycol, promethazine **OR** ondansetron    OBJECTIVE:  /66   Pulse 83   Temp 97.8 °F (36.6 °C) (Oral)   Resp 18   Ht 5' 2\" (1.575 m)   Wt 121 lb 11.2 oz (55.2 kg)   SpO2 93%   BMI 22.26 kg/m²   Temp  Av.3 °F (36.8 °C)  Min: 97.8 °F (36.6 °C)  Max: 98.7 °F (37.1 °C)  Constitutional: The patient is lying in bed. She is awake, alert and somewhat anxious. Skin: No rash. HEENT: Round and reactive pupils. Moist mucous membranes. No ulcerations or thrush. Neck: Supple to movements. Chest: Good breath sounds. No crackles. Cardiovascular: Heart sounds rhythmic and regular. Abdomen: Positive bowel sounds to auscultation. Benign to palpation. Extremities: Bilateral minimal lower extremity edema. Lines: Right femoral temporary HD catheter 2020.     Laboratory and Tests Review:  Lab Results   Component Value Date    WBC 12.7 (H) 2020    WBC 12.4 (H) 2020    WBC 10.3 2020    HGB 9.4 (L) 2020    HCT 30.0 (L) 2020    MCV 89.0 2020     2020     Lab Results   Component Value Date    NEUTROABS 13.10 (H) 2020

## 2020-05-27 NOTE — PROGRESS NOTES
complex ([facility]    Bronchiectasis ([facility]    [facility] obstructive [facility] disease) [facility]    Pneumonia due to organism    Hyperglycemia    Mycobacteriosis    Acute renal failure (ARF) ([facility])    Intractable nausea and vomiting    Hyperkalemia    Acidosis    Leukocytosis    Vertigo    Intractable vomiting    Pulmonary Mycobacterium avium complex (MAC) infection ([facility])               ASSESSMENT:  1.) MAC pneumonia   2. )MATTEO   3.)Cough   4. )Malnutrtion        PLAN:  Same Plan   *-CXR still showing of scaring ,chronic changes ,hard to say but since she does not want treatment ,then will hold on CT   *_Not interested in MAC treatement , discuss with ID ,will wait until we see her kidney function and if she will recover  *- HD as per renal   *-abx per ID   *-BD   *_no need for sputum culture at this time   *- will follow along       [address] MD Nicholas,[facility]  Pulmonary&Critical Care Medicine   Director of [address] [place] [address] Director of [address] [place] [address]    Serafin Ruvalcaba    NOTE: This report was transcribed using voice recognition software. Every effort was made to ensure accuracy; however, inadvertent computerized transcription errors may be present.

## 2020-05-27 NOTE — PROGRESS NOTES
Type and Reason for Visit: RD Nutrition Re-Screen    Nutrition Screen:   · Have you recently lost weight without trying? - 0 to 1 pound (0 points)   · Have you been eating poorly because of a decreased appetite? - No (0 points)   · Malnutrition Screening Tool Score - 0    Dietitian Assessment of Nutrition Re-Screen: pt noted w/ improved PO intake since admit, currently eating % of meals per doc flow. Will f/up per policy.         Electronically signed by Ruby Posadas MS, RD, LD on 5/27/20 at 10:41 AM EDT    Contact Number: [phone]

## 2020-05-27 NOTE — PROGRESS NOTES
room. Patient denied experiencing dizziness. SBA using w/w   Mod I / Independent - with use of device, as needed/appropriate   Balance Sitting: Good  (at EOB)  Standing: Fair+  (with walker)   Fair+ dynamic standing balance during completion of ADLs and other functional tasks. Activity Tolerance Fair  fair  Patient will demonstrate Good understanding and consistent implementation of energy conservation techniques and work simplification techniques into ADL/IADL routines. Comments:  Pt agreeable to therapy. Reports she does not feel well however motivated to increase her level of activity. Cues for safety during session. Remained in chair at end of the session. Exercise: functional use of UE's noted. Education/treatment:  ADL retraining with facilitation of movement to increase self care. Therapeutic activity to address balance, endurance, and strength. Pt education of walker safety, transfer safety, and energy conservation. Fair carry over noted during session. · Pt has made  progress towards set goals.    · Continue with current plan of care        Treatment Charges: Mins Units    ADL/Home Mgt 83478 17 1    Thera Activities 42156 9 1    Ther Ex 16249      Manual Therapy 91837      Neuro Re-ed [id number]      Orthotic manage/training  75508      Non Billable Time      Total Timed Treatment 26 0625 [facility] Jacinta Schwarz MIGUEL/L [id number]

## 2020-05-27 NOTE — PROGRESS NOTES
(infusions):      MEDS (prn):  meclizine, trimethobenzamide, sodium chloride flush, heparin flush, sodium chloride flush, acetaminophen **OR** acetaminophen, polyethylene glycol, promethazine **OR** ondansetron    PHYSICAL EXAM:     Patient Vitals for the past 24 hrs:   BP Temp Temp src Pulse Resp SpO2 Weight   05/27/20 0839 135/66 97.8 °F (36.6 °C) Oral 83 18 93 % --   05/27/20 0202 -- -- -- -- -- -- 121 lb 11.2 oz (55.2 kg)   05/26/20 2310 123/64 98.7 °F (37.1 °C) Oral 76 18 94 % --   @      Intake/Output Summary (Last 24 hours) at 5/27/2020 1525  Last data filed at 5/27/2020 1451  Gross per 24 hour   Intake 360 ml   Output 3025 ml   Net -2665 ml         Wt Readings from Last 3 Encounters:   05/27/20 121 lb 11.2 oz (55.2 kg)   02/17/20 109 lb (49.4 kg)   01/01/20 106 lb 3.2 oz (48.2 kg)       Constitutional:  in no acute distress  Oral: mucus membranes moist  Neck: Soft supple, trachea midline, no JVD  Cardiovascular: S1, S2 regular rhythm, no murmur,or rub  Respiratory: Coarse breath sounds, few scattered crackles, no wheeze  Gastrointestinal:  Soft, nontender, nondistended, NABS  Ext: 1/2+ distal lower extremity edema, feet warm  Skin: dry, no rash  Neuro: awake, alert, interactive. Did not appreciate asterixis      DATA:    Recent Labs     05/25/20  0600 05/26/20  0550   WBC 12.4* 12.7*   HGB 9.7* 9.4*   HCT 30.3* 30.0*   MCV 89.1 89.0    260     Recent Labs     05/25/20 2015 05/26/20  0550 05/27/20  0318    136 138   K 3.6 3.6 3.4*   CL 98 101 101   CO2 29 29 28   MG  --   --  1.7   PHOS  --   --  3.7   BUN 23 26* 29*   CREATININE 4.0* 4.2* 4.0*       Lab Results   Component Value Date    LABPROT 0.6 (H) 05/20/2020    LABPROT 0.6 05/20/2020         Assessment  1.  Acute kidney injury, severe, oliguric, secondary to severe prerenal azotemia, possibly evolved into acute tubular necrosis due to nausea, vomiting, anorexia with very poor intake, watery diarrhea, with possible superimposed

## 2020-05-27 NOTE — PATIENT CARE CONFERENCE
[facility] Quality Flow/Interdisciplinary Rounds Progress Note        Quality Flow Rounds held on May 27, 2020    Disciplines Attending:  Bedside Nurse, ,  and Nursing Unit Leadership    Kareen Regalado was admitted on 5/20/2020  2:32 PM    Anticipated Discharge Date:  Expected Discharge Date: 05/22/20    Disposition:    Trevor Score:  Trevor Scale Score: 22    Readmission Score:         Discussed patient goal for the day, patient clinical progression, and barriers to discharge. The following Goal(s) of the Day/Commitment(s) have been identified:  Monitor labs, input from consults and cont to monitor.        Amos Fay  May 27, 2020

## 2020-05-28 LAB
ANION GAP SERPL CALCULATED.3IONS-SCNC: 10 MMOL/L (ref 7–16)
ANION GAP SERPL CALCULATED.3IONS-SCNC: 10 MMOL/L (ref 7–16)
BUN BLDV-MCNC: 34 MG/DL (ref 8–23)
BUN BLDV-MCNC: 36 MG/DL (ref 8–23)
CALCIUM SERPL-MCNC: 8.5 MG/DL (ref 8.6–10.2)
CALCIUM SERPL-MCNC: 8.7 MG/DL (ref 8.6–10.2)
CHLORIDE BLD-SCNC: 102 MMOL/L (ref 98–107)
CHLORIDE BLD-SCNC: 104 MMOL/L (ref 98–107)
CO2: 27 MMOL/L (ref 22–29)
CO2: 28 MMOL/L (ref 22–29)
CREAT SERPL-MCNC: 3.2 MG/DL (ref 0.5–1)
CREAT SERPL-MCNC: 3.4 MG/DL (ref 0.5–1)
GFR AFRICAN AMERICAN: 17
GFR AFRICAN AMERICAN: 18
GFR NON-AFRICAN AMERICAN: 14 ML/MIN/1.73
GFR NON-AFRICAN AMERICAN: 15 ML/MIN/1.73
GLUCOSE BLD-MCNC: 145 MG/DL (ref 74–99)
GLUCOSE BLD-MCNC: 96 MG/DL (ref 74–99)
MAGNESIUM: 1.7 MG/DL (ref 1.6–2.6)
PHOSPHORUS: 3.9 MG/DL (ref 2.5–4.5)
POTASSIUM SERPL-SCNC: 3.9 MMOL/L (ref 3.5–5)
POTASSIUM SERPL-SCNC: 4.3 MMOL/L (ref 3.5–5)
SODIUM BLD-SCNC: 139 MMOL/L (ref 132–146)
SODIUM BLD-SCNC: 142 MMOL/L (ref 132–146)

## 2020-05-28 PROCEDURE — 6360000002 HC RX W HCPCS: Performed by: NURSE PRACTITIONER

## 2020-05-28 PROCEDURE — 2580000003 HC RX 258: Performed by: NURSE PRACTITIONER

## 2020-05-28 PROCEDURE — 84100 ASSAY OF PHOSPHORUS: CPT

## 2020-05-28 PROCEDURE — C9113 INJ PANTOPRAZOLE SODIUM, VIA: HCPCS | Performed by: NURSE PRACTITIONER

## 2020-05-28 PROCEDURE — 6360000002 HC RX W HCPCS: Performed by: INTERNAL MEDICINE

## 2020-05-28 PROCEDURE — 6370000000 HC RX 637 (ALT 250 FOR IP): Performed by: NURSE PRACTITIONER

## 2020-05-28 PROCEDURE — 80048 BASIC METABOLIC PNL TOTAL CA: CPT

## 2020-05-28 PROCEDURE — 99232 SBSQ HOSP IP/OBS MODERATE 35: CPT | Performed by: INTERNAL MEDICINE

## 2020-05-28 PROCEDURE — 83735 ASSAY OF MAGNESIUM: CPT

## 2020-05-28 PROCEDURE — APPSS30 APP SPLIT SHARED TIME 16-30 MINUTES: Performed by: NURSE PRACTITIONER

## 2020-05-28 PROCEDURE — 2060000000 HC ICU INTERMEDIATE R&B

## 2020-05-28 PROCEDURE — 36415 COLL VENOUS BLD VENIPUNCTURE: CPT

## 2020-05-28 PROCEDURE — 97530 THERAPEUTIC ACTIVITIES: CPT

## 2020-05-28 RX ADMIN — SODIUM CHLORIDE, PRESERVATIVE FREE 300 UNITS: 5 INJECTION INTRAVENOUS at 08:08

## 2020-05-28 RX ADMIN — HEPARIN SODIUM 5000 UNITS: 10000 INJECTION, SOLUTION INTRAVENOUS; SUBCUTANEOUS at 14:33

## 2020-05-28 RX ADMIN — HEPARIN SODIUM 5000 UNITS: 10000 INJECTION, SOLUTION INTRAVENOUS; SUBCUTANEOUS at 21:00

## 2020-05-28 RX ADMIN — MECLIZINE 25 MG: 12.5 TABLET ORAL at 05:50

## 2020-05-28 RX ADMIN — PANTOPRAZOLE SODIUM 40 MG: 40 INJECTION, POWDER, FOR SOLUTION INTRAVENOUS at 08:07

## 2020-05-28 RX ADMIN — Medication 10 ML: at 21:01

## 2020-05-28 RX ADMIN — MECLIZINE 25 MG: 12.5 TABLET ORAL at 12:18

## 2020-05-28 RX ADMIN — SODIUM CHLORIDE, PRESERVATIVE FREE 300 UNITS: 5 INJECTION INTRAVENOUS at 21:01

## 2020-05-28 RX ADMIN — Medication 10 ML: at 08:07

## 2020-05-28 RX ADMIN — MECLIZINE 25 MG: 12.5 TABLET ORAL at 18:21

## 2020-05-28 RX ADMIN — ACETAMINOPHEN 650 MG: 325 TABLET ORAL at 14:31

## 2020-05-28 RX ADMIN — HEPARIN SODIUM 5000 UNITS: 10000 INJECTION, SOLUTION INTRAVENOUS; SUBCUTANEOUS at 05:21

## 2020-05-28 RX ADMIN — ACETAMINOPHEN 650 MG: 325 TABLET ORAL at 21:01

## 2020-05-28 ASSESSMENT — PAIN DESCRIPTION - LOCATION
LOCATION: HEAD
LOCATION: HEAD

## 2020-05-28 ASSESSMENT — PAIN DESCRIPTION - ONSET
ONSET: GRADUAL
ONSET: GRADUAL

## 2020-05-28 ASSESSMENT — PAIN SCALES - GENERAL
PAINLEVEL_OUTOF10: 3
PAINLEVEL_OUTOF10: 3
PAINLEVEL_OUTOF10: 0
PAINLEVEL_OUTOF10: 0

## 2020-05-28 ASSESSMENT — PAIN DESCRIPTION - DESCRIPTORS
DESCRIPTORS: HEADACHE
DESCRIPTORS: HEADACHE

## 2020-05-28 ASSESSMENT — PAIN - FUNCTIONAL ASSESSMENT
PAIN_FUNCTIONAL_ASSESSMENT: PREVENTS OR INTERFERES SOME ACTIVE ACTIVITIES AND ADLS
PAIN_FUNCTIONAL_ASSESSMENT: PREVENTS OR INTERFERES SOME ACTIVE ACTIVITIES AND ADLS

## 2020-05-28 ASSESSMENT — PAIN DESCRIPTION - PAIN TYPE
TYPE: ACUTE PAIN
TYPE: ACUTE PAIN

## 2020-05-28 ASSESSMENT — PAIN DESCRIPTION - FREQUENCY
FREQUENCY: INTERMITTENT
FREQUENCY: CONTINUOUS

## 2020-05-28 ASSESSMENT — PAIN DESCRIPTION - PROGRESSION
CLINICAL_PROGRESSION: GRADUALLY WORSENING
CLINICAL_PROGRESSION: GRADUALLY WORSENING

## 2020-05-28 NOTE — PROGRESS NOTES
NEUTROABS 3.56 02/28/2020     No results found for: CRPHS  Lab Results   Component Value Date    ALT 18 05/22/2020    AST 17 05/22/2020    ALKPHOS 57 05/22/2020    BILITOT <0.2 05/22/2020     Lab Results   Component Value Date     05/28/2020    K 3.9 05/28/2020    K 4.4 01/01/2020     05/28/2020    CO2 28 05/28/2020    BUN 34 05/28/2020    CREATININE 3.4 05/28/2020    CREATININE 4.0 05/27/2020    CREATININE 4.2 05/26/2020    GFRAA 17 05/28/2020    LABGLOM 14 05/28/2020    GLUCOSE 96 05/28/2020    PROT 5.2 05/22/2020    LABALBU 2.6 05/22/2020    CALCIUM 8.7 05/28/2020    BILITOT <0.2 05/22/2020    ALKPHOS 57 05/22/2020    AST 17 05/22/2020    ALT 18 05/22/2020     Lab Results   Component Value Date    CRP 3.2 (H) 05/22/2020    CRP 0.4 04/30/2020    CRP 0.6 (H) 02/28/2020     Lab Results   Component Value Date    SEDRATE 45 (H) 05/22/2020    SEDRATE 55 (H) 04/30/2020    SEDRATE 41 (H) 02/28/2020     Radiology:      Microbiology:   Respiratory panel: Negative  SARS-CoV-2 5/24/2020: Not detected    ASSESSMENT:  · Recurrent MAC infection. Previously treated in 2016 with Rifampin, Clarithromycin, Ethambutol. Patient stopped treatment because she could not tolerate medications. She thought Clarithromycin had made her sick. She now thinks it was most likely Rifampin. She started Arikayce earlier this month. She also began taking Rifampin and Ethambutol 1 week prior to the admission. He became extremely weak and stopped eating and drinking. She was having aches and was taking naproxen.     · Acute kidney injury, improving  · Leukocytosis secondary to MATTEO  · SARS-CoV-2 less likely and at low risk    PLAN:  · The patient is no longer interested on MAC treatment  · We have no further recommendations    Kimball Олег  12:25 PM  5/28/2020

## 2020-05-28 NOTE — CARE COORDINATION
CASE MANAGEMENT. .... Chart reviewed. Temp right fem HD access remains in place. Hope to only need HD temporarily. Follow labs, good uo, vasc sx on board should tunneled cath needed. Discharge needs TBD. Will cont to follow.

## 2020-05-28 NOTE — PROGRESS NOTES
oliguric, secondary to severe prerenal azotemia, possibly evolved into acute tubular necrosis due to nausea, vomiting, anorexia with very poor intake, watery diarrhea, with possible superimposed rifampin-induced AIN versus direct tubular toxicity. Status post IV normal saline bolus and IV fluid resuscitation, BUN and creatinine have improved slightly. Hyperkalemic, though mildly. Metabolic acidosis, wide anion gap, also mild at this point. 2. Hyperkalemia secondary acute kidney injury, decreased effective circulating volume, improving with treatment  3. Metabolic acidosis, wide anion gap, secondary to acute kidney injury, exacerbated by diarrhea  4.  MAC treated with rifampin, ethambutol, amikacin         -cr improved  -3450 cc urine again yesterday  -Appears euvolemic      Recommendations  -Hold dialysis again today  -Check a BMP this evening as slowly developing hypernatremia due to post ATN diuresis --if still rising, will start conservative-rate half-normal saline  -Encourage oral intake  -Continue supportive care  -Follow labs, UO  -Encourage oral intake      Electronically signed by Grisel Cervantes MD on 5/28/2020

## 2020-05-28 NOTE — PROGRESS NOTES
ALTHEA Ma CNP on 5/28/2020 at 3:53 PM  HOSPITALIST ATTENDING PHYSICIAN NOTE 5/28/2020 1900PM:    Details of the evaluation - subjective assessment (including medication profile, past medical, family and social history when applicable), examination, review of lab and test data, diagnostic impressions and medical decision making - performed by ALTHEA Ma CNP, were discussed with me on the date of service and I agree with clinical information herein unless otherwise noted. The patient has been evaluated by me personally earlier today. Pt reports no fevers, chills,n/v.     Exam: heart reg at rate of 78,lungs cta, abd pos bs soft nt, ext neg for le edema    I agree with the assessment and plan of ALTHEA Ma CNP    evelia  hypokalemia  acidosis  Hyperkalemia  N/v  hyperphosphatemia  Leukocytosis  Copd  Recurrent MAC infection/pneumonia    Electronically signed by Malik Scott D.O.   Hospitalist  [facility] Hospitalist [facility] at [facility]

## 2020-05-28 NOTE — CARE COORDINATION
CASE MANAGEMENT. ... Chart reviewed. Renal continues to follow-await input today. Continue to follow labs, urine output. Therapy notes reviewed. Discharge plan TBD pending patient progress. DOROTHY vs Home with C. Will cont to follow.

## 2020-05-29 LAB
ANION GAP SERPL CALCULATED.3IONS-SCNC: 9 MMOL/L (ref 7–16)
BUN BLDV-MCNC: 36 MG/DL (ref 8–23)
CALCIUM SERPL-MCNC: 8.7 MG/DL (ref 8.6–10.2)
CHLORIDE BLD-SCNC: 103 MMOL/L (ref 98–107)
CO2: 29 MMOL/L (ref 22–29)
CREAT SERPL-MCNC: 2.9 MG/DL (ref 0.5–1)
GFR AFRICAN AMERICAN: 20
GFR NON-AFRICAN AMERICAN: 16 ML/MIN/1.73
GLUCOSE BLD-MCNC: 96 MG/DL (ref 74–99)
MAGNESIUM: 1.7 MG/DL (ref 1.6–2.6)
PHOSPHORUS: 4.3 MG/DL (ref 2.5–4.5)
POTASSIUM SERPL-SCNC: 4 MMOL/L (ref 3.5–5)
SODIUM BLD-SCNC: 141 MMOL/L (ref 132–146)

## 2020-05-29 PROCEDURE — 84100 ASSAY OF PHOSPHORUS: CPT

## 2020-05-29 PROCEDURE — 80048 BASIC METABOLIC PNL TOTAL CA: CPT

## 2020-05-29 PROCEDURE — 2060000000 HC ICU INTERMEDIATE R&B

## 2020-05-29 PROCEDURE — 36415 COLL VENOUS BLD VENIPUNCTURE: CPT

## 2020-05-29 PROCEDURE — 6360000002 HC RX W HCPCS: Performed by: NURSE PRACTITIONER

## 2020-05-29 PROCEDURE — 2580000003 HC RX 258: Performed by: NURSE PRACTITIONER

## 2020-05-29 PROCEDURE — 6370000000 HC RX 637 (ALT 250 FOR IP): Performed by: NURSE PRACTITIONER

## 2020-05-29 PROCEDURE — C9113 INJ PANTOPRAZOLE SODIUM, VIA: HCPCS | Performed by: NURSE PRACTITIONER

## 2020-05-29 PROCEDURE — 90945 DIALYSIS ONE EVALUATION: CPT

## 2020-05-29 PROCEDURE — 6370000000 HC RX 637 (ALT 250 FOR IP): Performed by: SPECIALIST

## 2020-05-29 PROCEDURE — 83735 ASSAY OF MAGNESIUM: CPT

## 2020-05-29 PROCEDURE — 99232 SBSQ HOSP IP/OBS MODERATE 35: CPT | Performed by: INTERNAL MEDICINE

## 2020-05-29 PROCEDURE — 6360000002 HC RX W HCPCS: Performed by: INTERNAL MEDICINE

## 2020-05-29 RX ORDER — FLUCONAZOLE 100 MG/1
200 TABLET ORAL DAILY
Status: DISCONTINUED | OUTPATIENT
Start: 2020-05-29 | End: 2020-05-30 | Stop reason: HOSPADM

## 2020-05-29 RX ADMIN — HEPARIN SODIUM 5000 UNITS: 10000 INJECTION, SOLUTION INTRAVENOUS; SUBCUTANEOUS at 14:56

## 2020-05-29 RX ADMIN — ACETAMINOPHEN 650 MG: 325 TABLET ORAL at 08:39

## 2020-05-29 RX ADMIN — PROMETHAZINE HYDROCHLORIDE 12.5 MG: 25 TABLET ORAL at 12:45

## 2020-05-29 RX ADMIN — Medication 10 ML: at 21:30

## 2020-05-29 RX ADMIN — MECLIZINE 25 MG: 12.5 TABLET ORAL at 01:45

## 2020-05-29 RX ADMIN — FLUCONAZOLE 200 MG: 100 TABLET ORAL at 14:56

## 2020-05-29 RX ADMIN — HEPARIN SODIUM 5000 UNITS: 10000 INJECTION, SOLUTION INTRAVENOUS; SUBCUTANEOUS at 21:30

## 2020-05-29 RX ADMIN — ONDANSETRON 4 MG: 2 INJECTION INTRAMUSCULAR; INTRAVENOUS at 08:31

## 2020-05-29 RX ADMIN — MECLIZINE 25 MG: 12.5 TABLET ORAL at 23:03

## 2020-05-29 RX ADMIN — SODIUM CHLORIDE, PRESERVATIVE FREE 300 UNITS: 5 INJECTION INTRAVENOUS at 21:30

## 2020-05-29 RX ADMIN — SODIUM CHLORIDE, PRESERVATIVE FREE 300 UNITS: 5 INJECTION INTRAVENOUS at 08:39

## 2020-05-29 RX ADMIN — HEPARIN SODIUM 5000 UNITS: 10000 INJECTION, SOLUTION INTRAVENOUS; SUBCUTANEOUS at 05:58

## 2020-05-29 RX ADMIN — ONDANSETRON 4 MG: 2 INJECTION INTRAMUSCULAR; INTRAVENOUS at 01:42

## 2020-05-29 RX ADMIN — PANTOPRAZOLE SODIUM 40 MG: 40 INJECTION, POWDER, FOR SOLUTION INTRAVENOUS at 08:31

## 2020-05-29 RX ADMIN — Medication 10 ML: at 08:31

## 2020-05-29 RX ADMIN — ONDANSETRON 4 MG: 2 INJECTION INTRAMUSCULAR; INTRAVENOUS at 23:03

## 2020-05-29 ASSESSMENT — PAIN SCALES - GENERAL
PAINLEVEL_OUTOF10: 0
PAINLEVEL_OUTOF10: 0
PAINLEVEL_OUTOF10: 10
PAINLEVEL_OUTOF10: 0

## 2020-05-29 NOTE — PROGRESS NOTES
results for input(s): WBC, RBC, HGB, HCT, MCV, MCH, MCHC, RDW, PLT, MPV in the last 72 hours. BMP:    Lab Results   Component Value Date     05/29/2020    K 4.0 05/29/2020    K 4.4 01/01/2020     05/29/2020    CO2 29 05/29/2020    BUN 36 05/29/2020    LABALBU 2.6 05/22/2020    CREATININE 2.9 05/29/2020    CALCIUM 8.7 05/29/2020    GFRAA 20 05/29/2020    LABGLOM 16 05/29/2020    GLUCOSE 96 05/29/2020     Magnesium:    Lab Results   Component Value Date    MG 1.7 05/29/2020     Phosphorus:    Lab Results   Component Value Date    PHOS 4.3 05/29/2020        Radiology:   XR CHEST PORTABLE   Final Result   Vague ill-defined groundglass densities are seen towards   the mid lower aspect of both lungs with some pleural reaction. The can   consider reevaluation with a CT scan of the chest due to the presence   of a previous cavitating lesions are. There is a pattern of chronic   bronchiectasis of the right middle lobe. Heart has normal size. Right-sided PICC line tip in SVC. IMPRESSION:    1. Persistent groundglass density vague and ill-defined towards the   mid lower aspect of both lungs with some degree of pleural reaction. 2. Further evaluation with a CT scan chest recommended. XR CHEST PORTABLE   Final Result      Bibasilar opacities notable on the right could suggest pneumonia   and/or atelectasis. CT ABDOMEN PELVIS WO CONTRAST Additional Contrast? None   Final Result      1. Mesenteric vein and to a lesser extent retroperitoneal edema, the   etiology of which is unclear. It may be due to patient's history of   renal failure. Other etiologies cannot be excluded. 2.  Mild bilateral perinephric stranding, which medical renal disease. No urolithiasis or hydronephrosis. 3.  Clustered nodules in the lung bases, right greater than left. Scarring in the right middle and lower lobes.  Findings are likely   consistent with patient's history of mycobacterium avium complex

## 2020-05-29 NOTE — FLOWSHEET NOTE
Rt temp fem cath sutures clipped and catheter removed. Pressure held to site x 15 min with no bleeding noted. Dressing applied. Pt instructed to stay in bed for at least 30-45 minutes and to notify staff if rt leg becomes painful or feeling cold. Pt verbalized understanding. Report given to floor nurse.

## 2020-05-29 NOTE — CARE COORDINATION
CM spoke with charge nurse for clinical update. Plan remains that HD is temporary. Renal on case & following for ARF.

## 2020-05-30 VITALS
DIASTOLIC BLOOD PRESSURE: 49 MMHG | HEART RATE: 75 BPM | OXYGEN SATURATION: 97 % | TEMPERATURE: 98.3 F | BODY MASS INDEX: 21.16 KG/M2 | SYSTOLIC BLOOD PRESSURE: 100 MMHG | WEIGHT: 115 LBS | RESPIRATION RATE: 16 BRPM | HEIGHT: 62 IN

## 2020-05-30 LAB
ANION GAP SERPL CALCULATED.3IONS-SCNC: 9 MMOL/L (ref 7–16)
BUN BLDV-MCNC: 41 MG/DL (ref 8–23)
CALCIUM SERPL-MCNC: 9 MG/DL (ref 8.6–10.2)
CHLORIDE BLD-SCNC: 106 MMOL/L (ref 98–107)
CO2: 28 MMOL/L (ref 22–29)
CREAT SERPL-MCNC: 2.6 MG/DL (ref 0.5–1)
GFR AFRICAN AMERICAN: 22
GFR NON-AFRICAN AMERICAN: 19 ML/MIN/1.73
GLUCOSE BLD-MCNC: 94 MG/DL (ref 74–99)
MAGNESIUM: 1.7 MG/DL (ref 1.6–2.6)
PHOSPHORUS: 3.7 MG/DL (ref 2.5–4.5)
POTASSIUM SERPL-SCNC: 4.5 MMOL/L (ref 3.5–5)
SODIUM BLD-SCNC: 143 MMOL/L (ref 132–146)
VITAMIN B1 WHOLE BLOOD: 95 NMOL/L (ref 70–180)

## 2020-05-30 PROCEDURE — 6360000002 HC RX W HCPCS: Performed by: INTERNAL MEDICINE

## 2020-05-30 PROCEDURE — 2580000003 HC RX 258: Performed by: NURSE PRACTITIONER

## 2020-05-30 PROCEDURE — 6360000002 HC RX W HCPCS: Performed by: NURSE PRACTITIONER

## 2020-05-30 PROCEDURE — 99239 HOSP IP/OBS DSCHRG MGMT >30: CPT | Performed by: INTERNAL MEDICINE

## 2020-05-30 PROCEDURE — 6370000000 HC RX 637 (ALT 250 FOR IP): Performed by: SPECIALIST

## 2020-05-30 PROCEDURE — 83735 ASSAY OF MAGNESIUM: CPT

## 2020-05-30 PROCEDURE — 36415 COLL VENOUS BLD VENIPUNCTURE: CPT

## 2020-05-30 PROCEDURE — 84100 ASSAY OF PHOSPHORUS: CPT

## 2020-05-30 PROCEDURE — 97530 THERAPEUTIC ACTIVITIES: CPT

## 2020-05-30 PROCEDURE — 80048 BASIC METABOLIC PNL TOTAL CA: CPT

## 2020-05-30 PROCEDURE — C9113 INJ PANTOPRAZOLE SODIUM, VIA: HCPCS | Performed by: NURSE PRACTITIONER

## 2020-05-30 RX ORDER — ONDANSETRON 4 MG/1
4 TABLET, ORALLY DISINTEGRATING ORAL 3 TIMES DAILY PRN
Qty: 21 TABLET | Refills: 0 | Status: SHIPPED | OUTPATIENT
Start: 2020-05-30 | End: 2021-01-05 | Stop reason: ALTCHOICE

## 2020-05-30 RX ORDER — FLUCONAZOLE 200 MG/1
200 TABLET ORAL DAILY
Qty: 7 TABLET | Refills: 0 | Status: SHIPPED | OUTPATIENT
Start: 2020-05-31 | End: 2020-06-07

## 2020-05-30 RX ORDER — PANTOPRAZOLE SODIUM 40 MG/1
40 TABLET, DELAYED RELEASE ORAL
Qty: 30 TABLET | Refills: 0 | Status: SHIPPED | OUTPATIENT
Start: 2020-05-30 | End: 2020-06-10

## 2020-05-30 RX ORDER — FLUCONAZOLE 200 MG/1
200 TABLET ORAL DAILY
Qty: 12 TABLET | Refills: 0 | Status: SHIPPED | OUTPATIENT
Start: 2020-05-31 | End: 2020-05-30

## 2020-05-30 RX ADMIN — ONDANSETRON 4 MG: 2 INJECTION INTRAMUSCULAR; INTRAVENOUS at 09:01

## 2020-05-30 RX ADMIN — Medication 10 ML: at 09:02

## 2020-05-30 RX ADMIN — PANTOPRAZOLE SODIUM 40 MG: 40 INJECTION, POWDER, FOR SOLUTION INTRAVENOUS at 09:01

## 2020-05-30 RX ADMIN — FLUCONAZOLE 200 MG: 100 TABLET ORAL at 09:02

## 2020-05-30 RX ADMIN — SODIUM CHLORIDE, PRESERVATIVE FREE 300 UNITS: 5 INJECTION INTRAVENOUS at 09:02

## 2020-05-30 RX ADMIN — HEPARIN SODIUM 5000 UNITS: 10000 INJECTION, SOLUTION INTRAVENOUS; SUBCUTANEOUS at 06:05

## 2020-05-30 ASSESSMENT — PAIN SCALES - GENERAL
PAINLEVEL_OUTOF10: 0
PAINLEVEL_OUTOF10: 0

## 2020-05-30 NOTE — PROGRESS NOTES
and make a follow-up appointment in a few weeks    Discussed with Dr. Ramona Lopez  12:23 PM  5/30/2020

## 2020-05-30 NOTE — DISCHARGE SUMMARY
excluded. 2.  Mild bilateral perinephric stranding, which medical renal disease. No urolithiasis or hydronephrosis. 3.  Clustered nodules in the lung bases, right greater than left. Scarring in the right middle and lower lobes. Findings are likely   consistent with patient's history of mycobacterium avium complex   infection. Patient Instructions:      Medication List      START taking these medications    fluconazole 200 MG tablet  Commonly known as:  DIFLUCAN  Take 1 tablet by mouth daily for 7 days  Start taking on: May 31, 2020     ondansetron 4 MG disintegrating tablet  Commonly known as:  ZOFRAN-ODT  Take 1 tablet by mouth 3 times daily as needed for Nausea or Vomiting     pantoprazole 40 MG tablet  Commonly known as:  PROTONIX  Take 1 tablet by mouth every morning (before breakfast)        CHANGE how you take these medications    vilazodone hcl 40 MG Tabs  Generic drug:  vilazodone HCl  What changed:  Another medication with the same name was removed. Continue taking this medication, and follow the directions you see here. CONTINUE taking these medications    albuterol sulfate  (90 Base) MCG/ACT inhaler     ipratropium-albuterol 0.5-2.5 (3) MG/3ML Soln nebulizer solution  Commonly known as:  DUONEB  Inhale 3 mLs into the lungs every 4 hours (while awake) .         STOP taking these medications    benzonatate 100 MG capsule  Commonly known as:  TESSALON     naproxen sodium 220 MG tablet  Commonly known as:  ANAPROX           Where to Get Your Medications      These medications were sent to [address] [facility] [phone]  [address] [place] [address], [address] [place] [address]    Phone:  [phone]   · fluconazole 200 MG tablet  · ondansetron 4 MG disintegrating tablet  · pantoprazole 40 MG tablet         Total time for discharge is 37 min    Signed:  Electronically signed by Christine Mcdonald DO on 5/30/2020 at 4:58 PM

## 2020-06-03 ENCOUNTER — HOSPITAL ENCOUNTER (OUTPATIENT)
Age: 65
Discharge: HOME | End: 2020-06-03
Payer: MEDICARE

## 2020-06-03 LAB
ANION GAP SERPL CALCULATED.3IONS-SCNC: 10 MMOL/L (ref 7–16)
BUN BLDV-MCNC: 30 MG/DL (ref 8–23)
CALCIUM SERPL-MCNC: 9.5 MG/DL (ref 8.6–10.2)
CHLORIDE BLD-SCNC: 103 MMOL/L (ref 98–107)
CO2: 29 MMOL/L (ref 22–29)
CREAT SERPL-MCNC: 1.8 MG/DL (ref 0.5–1)
GFR AFRICAN AMERICAN: 34
GFR NON-AFRICAN AMERICAN: 28 ML/MIN/1.73
GLUCOSE BLD-MCNC: 101 MG/DL (ref 74–99)
POTASSIUM SERPL-SCNC: 5.2 MMOL/L (ref 3.5–5)
SODIUM BLD-SCNC: 142 MMOL/L (ref 132–146)

## 2020-06-03 PROCEDURE — 36415 COLL VENOUS BLD VENIPUNCTURE: CPT

## 2020-06-03 PROCEDURE — 80048 BASIC METABOLIC PNL TOTAL CA: CPT

## 2020-06-06 ENCOUNTER — HOSPITAL ENCOUNTER (EMERGENCY)
Age: 65
Discharge: HOME | End: 2020-06-06
Attending: EMERGENCY MEDICINE
Payer: MEDICARE

## 2020-06-06 ENCOUNTER — APPOINTMENT (OUTPATIENT)
Dept: GENERAL RADIOLOGY | Age: 65
End: 2020-06-06
Payer: MEDICARE

## 2020-06-06 ENCOUNTER — APPOINTMENT (OUTPATIENT)
Dept: CT IMAGING | Age: 65
End: 2020-06-06
Payer: MEDICARE

## 2020-06-06 VITALS
BODY MASS INDEX: 17.89 KG/M2 | WEIGHT: 101 LBS | SYSTOLIC BLOOD PRESSURE: 126 MMHG | RESPIRATION RATE: 16 BRPM | TEMPERATURE: 97.8 F | HEIGHT: 63 IN | OXYGEN SATURATION: 97 % | DIASTOLIC BLOOD PRESSURE: 82 MMHG | HEART RATE: 82 BPM

## 2020-06-06 LAB
ALBUMIN SERPL-MCNC: 4.2 G/DL (ref 3.5–5.2)
ALP BLD-CCNC: 62 U/L (ref 35–104)
ALT SERPL-CCNC: 12 U/L (ref 0–32)
ANION GAP SERPL CALCULATED.3IONS-SCNC: 13 MMOL/L (ref 7–16)
AST SERPL-CCNC: 15 U/L (ref 0–31)
BASOPHILS ABSOLUTE: 0.09 E9/L (ref 0–0.2)
BASOPHILS RELATIVE PERCENT: 1.8 % (ref 0–2)
BILIRUB SERPL-MCNC: <0.2 MG/DL (ref 0–1.2)
BILIRUBIN URINE: NEGATIVE
BLOOD, URINE: NEGATIVE
BUN BLDV-MCNC: 28 MG/DL (ref 8–23)
CALCIUM SERPL-MCNC: 9.4 MG/DL (ref 8.6–10.2)
CHLORIDE BLD-SCNC: 103 MMOL/L (ref 98–107)
CLARITY: CLEAR
CO2: 26 MMOL/L (ref 22–29)
COLOR: YELLOW
CREAT SERPL-MCNC: 1.6 MG/DL (ref 0.5–1)
EOSINOPHILS ABSOLUTE: 0.21 E9/L (ref 0.05–0.5)
EOSINOPHILS RELATIVE PERCENT: 4.2 % (ref 0–6)
GFR AFRICAN AMERICAN: 39
GFR NON-AFRICAN AMERICAN: 33 ML/MIN/1.73
GLUCOSE BLD-MCNC: 102 MG/DL (ref 74–99)
GLUCOSE URINE: NEGATIVE MG/DL
HCT VFR BLD CALC: 31 % (ref 34–48)
HEMOGLOBIN: 9.6 G/DL (ref 11.5–15.5)
IMMATURE GRANULOCYTES #: 0.01 E9/L
IMMATURE GRANULOCYTES %: 0.2 % (ref 0–5)
KETONES, URINE: NEGATIVE MG/DL
LACTIC ACID: 1 MMOL/L (ref 0.5–2.2)
LEUKOCYTE ESTERASE, URINE: NEGATIVE
LIPASE: 79 U/L (ref 13–60)
LYMPHOCYTES ABSOLUTE: 1.33 E9/L (ref 1.5–4)
LYMPHOCYTES RELATIVE PERCENT: 26.6 % (ref 20–42)
MCH RBC QN AUTO: 28.9 PG (ref 26–35)
MCHC RBC AUTO-ENTMCNC: 31 % (ref 32–34.5)
MCV RBC AUTO: 93.4 FL (ref 80–99.9)
MONOCYTES ABSOLUTE: 0.65 E9/L (ref 0.1–0.95)
MONOCYTES RELATIVE PERCENT: 13 % (ref 2–12)
NEUTROPHILS ABSOLUTE: 2.71 E9/L (ref 1.8–7.3)
NEUTROPHILS RELATIVE PERCENT: 54.2 % (ref 43–80)
NITRITE, URINE: NEGATIVE
PDW BLD-RTO: 14.8 FL (ref 11.5–15)
PH UA: 6 (ref 5–9)
PLATELET # BLD: 336 E9/L (ref 130–450)
PMV BLD AUTO: 8.9 FL (ref 7–12)
POTASSIUM REFLEX MAGNESIUM: 4.4 MMOL/L (ref 3.5–5)
PRO-BNP: 878 PG/ML (ref 0–125)
PROTEIN UA: NEGATIVE MG/DL
RBC # BLD: 3.32 E12/L (ref 3.5–5.5)
SODIUM BLD-SCNC: 142 MMOL/L (ref 132–146)
SPECIFIC GRAVITY UA: 1.01 (ref 1–1.03)
TOTAL PROTEIN: 6.9 G/DL (ref 6.4–8.3)
TROPONIN: <0.01 NG/ML (ref 0–0.03)
UROBILINOGEN, URINE: 0.2 E.U./DL
WBC # BLD: 5 E9/L (ref 4.5–11.5)

## 2020-06-06 PROCEDURE — 74176 CT ABD & PELVIS W/O CONTRAST: CPT

## 2020-06-06 PROCEDURE — 84484 ASSAY OF TROPONIN QUANT: CPT

## 2020-06-06 PROCEDURE — 83605 ASSAY OF LACTIC ACID: CPT

## 2020-06-06 PROCEDURE — 83880 ASSAY OF NATRIURETIC PEPTIDE: CPT

## 2020-06-06 PROCEDURE — 85025 COMPLETE CBC W/AUTO DIFF WBC: CPT

## 2020-06-06 PROCEDURE — 83690 ASSAY OF LIPASE: CPT

## 2020-06-06 PROCEDURE — 80053 COMPREHEN METABOLIC PANEL: CPT

## 2020-06-06 PROCEDURE — 99284 EMERGENCY DEPT VISIT MOD MDM: CPT

## 2020-06-06 PROCEDURE — 96374 THER/PROPH/DIAG INJ IV PUSH: CPT

## 2020-06-06 PROCEDURE — 36415 COLL VENOUS BLD VENIPUNCTURE: CPT

## 2020-06-06 PROCEDURE — 71045 X-RAY EXAM CHEST 1 VIEW: CPT

## 2020-06-06 PROCEDURE — 81003 URINALYSIS AUTO W/O SCOPE: CPT

## 2020-06-06 PROCEDURE — 93005 ELECTROCARDIOGRAM TRACING: CPT | Performed by: EMERGENCY MEDICINE

## 2020-06-06 RX ORDER — METRONIDAZOLE 500 MG/1
500 TABLET ORAL 3 TIMES DAILY
Qty: 30 TABLET | Refills: 0 | Status: SHIPPED | OUTPATIENT
Start: 2020-06-06 | End: 2020-06-16

## 2020-06-06 RX ORDER — POLYETHYLENE GLYCOL 3350 17 G/17G
17 POWDER, FOR SOLUTION ORAL DAILY
Qty: 510 G | Refills: 0 | Status: SHIPPED | OUTPATIENT
Start: 2020-06-06 | End: 2020-07-06

## 2020-06-06 RX ORDER — FENTANYL CITRATE 50 UG/ML
50 INJECTION, SOLUTION INTRAMUSCULAR; INTRAVENOUS ONCE
Status: DISCONTINUED | OUTPATIENT
Start: 2020-06-06 | End: 2020-06-06 | Stop reason: HOSPADM

## 2020-06-06 RX ORDER — CEFDINIR 300 MG/1
300 CAPSULE ORAL DAILY
Qty: 10 CAPSULE | Refills: 0 | Status: SHIPPED | OUTPATIENT
Start: 2020-06-06 | End: 2020-06-16

## 2020-06-06 ASSESSMENT — ENCOUNTER SYMPTOMS
ABDOMINAL PAIN: 1
BACK PAIN: 0
SHORTNESS OF BREATH: 1
CONSTIPATION: 1
NAUSEA: 1

## 2020-06-06 ASSESSMENT — PAIN DESCRIPTION - FREQUENCY: FREQUENCY: CONTINUOUS

## 2020-06-06 ASSESSMENT — PAIN DESCRIPTION - DESCRIPTORS: DESCRIPTORS: DISCOMFORT

## 2020-06-06 ASSESSMENT — PAIN - FUNCTIONAL ASSESSMENT: PAIN_FUNCTIONAL_ASSESSMENT: PREVENTS OR INTERFERES SOME ACTIVE ACTIVITIES AND ADLS

## 2020-06-06 ASSESSMENT — PAIN DESCRIPTION - LOCATION: LOCATION: ABDOMEN;BACK

## 2020-06-06 ASSESSMENT — PAIN DESCRIPTION - ONSET: ONSET: ON-GOING

## 2020-06-06 ASSESSMENT — PAIN SCALES - GENERAL: PAINLEVEL_OUTOF10: 8

## 2020-06-06 ASSESSMENT — PAIN DESCRIPTION - PAIN TYPE: TYPE: ACUTE PAIN

## 2020-06-06 NOTE — ED PROVIDER NOTES
Movements: Extraocular movements intact. Pupils: Pupils are equal, round, and reactive to light. Neck:      Musculoskeletal: Normal range of motion and neck supple. Cardiovascular:      Rate and Rhythm: Normal rate and regular rhythm. Heart sounds: No murmur. Pulmonary:      Effort: Pulmonary effort is normal.      Breath sounds: Normal breath sounds. No wheezing, rhonchi or rales. Chest:      Chest wall: No tenderness. Abdominal:      Palpations: Abdomen is soft. Tenderness: There is no right CVA tenderness, left CVA tenderness, guarding or rebound. Comments: Slight LLQ and suprapubic tenderness to palpation   Musculoskeletal:      Right lower leg: No edema. Left lower leg: No edema. Skin:     General: Skin is warm and dry. Capillary Refill: Capillary refill takes less than 2 seconds. Neurological:      General: No focal deficit present. Mental Status: She is alert and oriented to person, place, and time. Psychiatric:         Mood and Affect: Mood normal.         Procedures    MDM  Number of Diagnoses or Management Options  Abdominal pain, unspecified abdominal location:   Abnormal CT scan:   Colitis:   Diagnosis management comments: This is an extremely pleasant 28-year-old female who presented to the ED for evaluation of abdominal pain that been ongoing for the past several days. The patient did seem quite uncomfortable on my initial examination and slightly dehydrated for which she was treated with IV fluids however she said that she did not want any pain medication even after I had ordered the medications. CT imaging demonstrated multiple bowel concerning for colitis possible inflammatory or infectious well is moderate to severe constipation. The patient had no new change in her creatinine and her troponin was unremarkable. No new changes in her EKG and on repeat examination after the IV fluids her abdominal pain had improved.   She maintained normal oxygen Negative Negative   EKG 12 Lead   Result Value Ref Range    Ventricular Rate 70 BPM    Atrial Rate 70 BPM    P-R Interval 150 ms    QRS Duration 126 ms    Q-T Interval 458 ms    QTc Calculation (Bazett) 494 ms    P Axis 65 degrees    R Axis 17 degrees    T Axis 93 degrees       Radiology:  XR CHEST PORTABLE   Final Result      Findings could suggest peribronchial inflammatory changes with areas   of subsegmental atelectasis or pneumonia extending from right hilar   location into right lung base. CT ABDOMEN PELVIS WO CONTRAST Additional Contrast? None   Final Result      1. View of lung bases shows bronchiectasis in visualized right lung   base with multiple centrilobular nodular opacities suggestive of   atypical viral or mycoplasma pneumonia versus infectious or   inflammatory bronchiolitis. These findings appear similar compared to   prior from May 20, 2020.      2. Redemonstration of two cavitary nodules in right lung base which   measure up to 12 x 11 mm. CT chest can be helpful for further   evaluation. 3. Thickened wall of multiple loops of small bowel located in the   central abdomen. These findings could suggest nonspecific infectious   or inflammatory enteritis. 4. Moderate to large amount of stool seen throughout the colon. ------------------------- NURSING NOTES AND VITALS REVIEWED ---------------------------  Date / Time Roomed:  6/6/2020  4:46 PM  ED Bed Assignment:  02/02    The nursing notes within the ED encounter and vital signs as below have been reviewed.    /82   Pulse 82   Temp 97.8 °F (36.6 °C) (Temporal)   Resp 16   Ht 5' 2.5\" (1.588 m)   Wt 101 lb (45.8 kg)   SpO2 97%   BMI 18.18 kg/m²   Oxygen Saturation Interpretation: Normal      ------------------------------------------ PROGRESS NOTES ------------------------------------------  9:36 PM EDT  I have spoken with the patient and discussed todays results, in addition to providing specific details for the

## 2020-06-07 LAB
EKG ATRIAL RATE: 70 BPM
EKG P AXIS: 65 DEGREES
EKG P-R INTERVAL: 150 MS
EKG Q-T INTERVAL: 458 MS
EKG QRS DURATION: 126 MS
EKG QTC CALCULATION (BAZETT): 494 MS
EKG R AXIS: 17 DEGREES
EKG T AXIS: 93 DEGREES
EKG VENTRICULAR RATE: 70 BPM

## 2020-06-07 PROCEDURE — 93010 ELECTROCARDIOGRAM REPORT: CPT | Performed by: INTERNAL MEDICINE

## 2020-06-10 ENCOUNTER — VIRTUAL VISIT (OUTPATIENT)
Dept: FAMILY MEDICINE CLINIC | Age: 65
End: 2020-06-10
Payer: MEDICARE

## 2020-06-10 PROCEDURE — 99214 OFFICE O/P EST MOD 30 MIN: CPT | Performed by: FAMILY MEDICINE

## 2020-06-10 RX ORDER — ALBUTEROL SULFATE 90 UG/1
2 AEROSOL, METERED RESPIRATORY (INHALATION) EVERY 6 HOURS PRN
Qty: 1 INHALER | Refills: 1 | Status: SHIPPED
Start: 2020-06-10 | End: 2020-09-04 | Stop reason: SDUPTHER

## 2020-06-10 RX ORDER — FAMOTIDINE 20 MG/1
20 TABLET, FILM COATED ORAL 2 TIMES DAILY
Qty: 180 TABLET | Refills: 1 | Status: SHIPPED
Start: 2020-06-10 | End: 2020-12-18

## 2020-06-10 ASSESSMENT — PATIENT HEALTH QUESTIONNAIRE - PHQ9
SUM OF ALL RESPONSES TO PHQ QUESTIONS 1-9: 0
1. LITTLE INTEREST OR PLEASURE IN DOING THINGS: 0
2. FEELING DOWN, DEPRESSED OR HOPELESS: 0
SUM OF ALL RESPONSES TO PHQ QUESTIONS 1-9: 0
SUM OF ALL RESPONSES TO PHQ9 QUESTIONS 1 & 2: 0

## 2020-06-10 ASSESSMENT — ENCOUNTER SYMPTOMS
ABDOMINAL DISTENTION: 0
WHEEZING: 0
EYE DISCHARGE: 0
SHORTNESS OF BREATH: 1
VOMITING: 0
SINUS PRESSURE: 0
CONSTIPATION: 0
COUGH: 0
CHEST TIGHTNESS: 1
EYE PAIN: 0
RHINORRHEA: 1
DIARRHEA: 0
BACK PAIN: 0
SORE THROAT: 0
COLOR CHANGE: 0
ABDOMINAL PAIN: 0

## 2020-06-10 NOTE — PROGRESS NOTES
production chronic   Eyes: Negative for pain and discharge. Respiratory: Positive for chest tightness (chronic) and shortness of breath (chronic). Negative for cough and wheezing. Cardiovascular: Positive for palpitations. Negative for chest pain and leg swelling. Gastrointestinal: Negative for abdominal distention, abdominal pain, constipation, diarrhea and vomiting. Endocrine: Negative for cold intolerance and heat intolerance. Genitourinary: Negative for decreased urine volume, frequency and urgency. Musculoskeletal: Negative for arthralgias and back pain. Skin: Negative for color change and rash. Allergic/Immunologic: Positive for environmental allergies. Negative for food allergies and immunocompromised state. Neurological: Positive for dizziness. Negative for syncope, weakness, numbness and headaches. Psychiatric/Behavioral: Negative for agitation and behavioral problems. Outpatient Medications Marked as Taking for the 6/10/20 encounter (Virtual Visit) with Carola Bull MD   Medication Sig Dispense Refill    albuterol sulfate  (90 Base) MCG/ACT inhaler Inhale 2 puffs into the lungs every 6 hours as needed for Wheezing 1 Inhaler 1    famotidine (PEPCID) 20 MG tablet Take 1 tablet by mouth 2 times daily 180 tablet 1    polyethylene glycol (MIRALAX) 17 GM/SCOOP powder Take 17 g by mouth daily 510 g 0    ondansetron (ZOFRAN-ODT) 4 MG disintegrating tablet Take 1 tablet by mouth 3 times daily as needed for Nausea or Vomiting 21 tablet 0    vilazodone HCl (VILAZODONE HCL) 40 MG TABS Take 40 mg by mouth daily      ipratropium-albuterol (DUONEB) 0.5-2.5 (3) MG/3ML SOLN nebulizer solution Inhale 3 mLs into the lungs every 4 hours (while awake) . 360 mL 0       I have reviewed all pertinent PMHx, PSHx, FamHx, SocialHx, medications, and allergies and updated history as appropriate. OBJECTIVE    VS: There were no vitals taken for this visit.   Physical assessment and plan. All questions answered. Please note this report has been partially produced using speech recognition software  and may contain errors related to that system including grammar, punctuation and spelling as well as words and phrases that may seem inappropriate. If there are questions or concerns please feel free to contact me to clarify. Franky Delgado is a 63 y.o. female evaluated via telephone on 6/10/2020. Consent:  She and/or health care decision maker is aware that that she may receive a bill for this telephone service, depending on her insurance coverage, and has provided verbal consent to proceed: Yes      Documentation:  I communicated with the patient and/or health care decision maker about see note. Details of this discussion including any medical advice provided: see note. I affirm this is a Patient Initiated Episode with a Patient who has not had a related appointment within my department in the past 7 days or scheduled within the next 24 hours.     Patient identification was verified at the start of the visit: Yes    Total Time: minutes: 21-30 minutes    Note: not billable if this call serves to triage the patient into an appointment for the relevant concern      Merissa Cleaves

## 2020-06-12 ENCOUNTER — HOSPITAL ENCOUNTER (OUTPATIENT)
Age: 65
Discharge: HOME | End: 2020-06-12
Payer: MEDICARE

## 2020-06-12 LAB
ANION GAP SERPL CALCULATED.3IONS-SCNC: 13 MMOL/L (ref 7–16)
BUN BLDV-MCNC: 29 MG/DL (ref 8–23)
CALCIUM SERPL-MCNC: 9.6 MG/DL (ref 8.6–10.2)
CHLORIDE BLD-SCNC: 101 MMOL/L (ref 98–107)
CO2: 26 MMOL/L (ref 22–29)
CREAT SERPL-MCNC: 1.5 MG/DL (ref 0.5–1)
GFR AFRICAN AMERICAN: 42
GFR NON-AFRICAN AMERICAN: 35 ML/MIN/1.73
GLUCOSE BLD-MCNC: 95 MG/DL (ref 74–99)
POTASSIUM SERPL-SCNC: 4.5 MMOL/L (ref 3.5–5)
SODIUM BLD-SCNC: 140 MMOL/L (ref 132–146)

## 2020-06-12 PROCEDURE — 80048 BASIC METABOLIC PNL TOTAL CA: CPT

## 2020-06-12 PROCEDURE — 36415 COLL VENOUS BLD VENIPUNCTURE: CPT

## 2020-06-16 ENCOUNTER — VIRTUAL VISIT (OUTPATIENT)
Dept: PULMONOLOGY | Age: 65
End: 2020-06-16
Payer: MEDICARE

## 2020-06-21 PROBLEM — K21.9 GASTROESOPHAGEAL REFLUX DISEASE WITHOUT ESOPHAGITIS: Status: ACTIVE | Noted: 2020-06-21

## 2020-06-21 PROBLEM — J30.2 SEASONAL ALLERGIES: Status: ACTIVE | Noted: 2020-06-21

## 2020-08-25 ENCOUNTER — TELEPHONE (OUTPATIENT)
Dept: FAMILY MEDICINE CLINIC | Age: 65
End: 2020-08-25

## 2020-08-25 NOTE — TELEPHONE ENCOUNTER
Patient states she spoke with Dr. Pradeep Charles office and they will not address her return to work. Says it has to come from PCP. Please advise.

## 2020-08-25 NOTE — TELEPHONE ENCOUNTER
Patient is established with Dr. Cornejo Later. F/u with him for work restrictions. I don't believe we have filled out paperwork for her?

## 2020-08-26 NOTE — TELEPHONE ENCOUNTER
Patient has only been seen once, 6/10 for a virtual visit and she was suppose to have a f/u 4 weeks after, but has not. Needs to be seen in office for a visit.

## 2020-08-27 NOTE — TELEPHONE ENCOUNTER
This MA spoke with pt - advised pt needs appointment to discuss return to work. Pt amendable. Pt scheduled for 08/31 @1:30PM. Pt verbalized she understood appointment date and time.     Electronically signed by Marilin Servin MA on 8/27/20 at 10:44 AM EDT

## 2020-09-04 ENCOUNTER — OFFICE VISIT (OUTPATIENT)
Dept: FAMILY MEDICINE CLINIC | Age: 65
End: 2020-09-04
Payer: MEDICARE

## 2020-09-04 VITALS
HEIGHT: 62 IN | RESPIRATION RATE: 18 BRPM | BODY MASS INDEX: 19.14 KG/M2 | OXYGEN SATURATION: 99 % | HEART RATE: 63 BPM | TEMPERATURE: 98 F | DIASTOLIC BLOOD PRESSURE: 80 MMHG | SYSTOLIC BLOOD PRESSURE: 118 MMHG | WEIGHT: 104 LBS

## 2020-09-04 PROCEDURE — 3023F SPIROM DOC REV: CPT | Performed by: FAMILY MEDICINE

## 2020-09-04 PROCEDURE — G8427 DOCREV CUR MEDS BY ELIG CLIN: HCPCS | Performed by: FAMILY MEDICINE

## 2020-09-04 PROCEDURE — 99213 OFFICE O/P EST LOW 20 MIN: CPT | Performed by: FAMILY MEDICINE

## 2020-09-04 PROCEDURE — 1036F TOBACCO NON-USER: CPT | Performed by: FAMILY MEDICINE

## 2020-09-04 PROCEDURE — G8926 SPIRO NO PERF OR DOC: HCPCS | Performed by: FAMILY MEDICINE

## 2020-09-04 PROCEDURE — G8420 CALC BMI NORM PARAMETERS: HCPCS | Performed by: FAMILY MEDICINE

## 2020-09-04 PROCEDURE — 3017F COLORECTAL CA SCREEN DOC REV: CPT | Performed by: FAMILY MEDICINE

## 2020-09-04 RX ORDER — ALBUTEROL SULFATE 90 UG/1
2 AEROSOL, METERED RESPIRATORY (INHALATION) EVERY 6 HOURS PRN
Qty: 1 INHALER | Refills: 3 | Status: SHIPPED
Start: 2020-09-04 | End: 2021-05-27 | Stop reason: SDUPTHER

## 2020-09-04 ASSESSMENT — ENCOUNTER SYMPTOMS
BLURRED VISION: 0
CHOKING: 0
FACIAL SWELLING: 0
TROUBLE SWALLOWING: 0
VOICE CHANGE: 0
ABDOMINAL PAIN: 0
PHOTOPHOBIA: 0
STRIDOR: 0
EYE ITCHING: 0
BACK PAIN: 0
ANAL BLEEDING: 0
GASTROINTESTINAL NEGATIVE: 1
SINUS PRESSURE: 0
SHORTNESS OF BREATH: 0
EYE REDNESS: 0
APNEA: 0
WHEEZING: 0
NAUSEA: 0
RESPIRATORY NEGATIVE: 1
BLOOD IN STOOL: 0
ABDOMINAL DISTENTION: 0
VOMITING: 0
SINUS PAIN: 0
COUGH: 0
EYE DISCHARGE: 0
EYE PAIN: 0
ALLERGIC/IMMUNOLOGIC NEGATIVE: 1
CONSTIPATION: 0
COLOR CHANGE: 0
CHEST TIGHTNESS: 0
RHINORRHEA: 0
ORTHOPNEA: 0
DIARRHEA: 0
SORE THROAT: 0
RECTAL PAIN: 0

## 2020-09-04 NOTE — PATIENT INSTRUCTIONS
Patient Education        Chronic Obstructive Pulmonary Disease (COPD): Care Instructions  Your Care Instructions     Chronic obstructive pulmonary disease (COPD) is a general term for a group of lung diseases, including emphysema and chronic bronchitis. People with COPD have decreased airflow in and out of the lungs, which makes it hard to breathe. The airways also can get clogged with thick mucus. Cigarette smoking is a major cause of COPD. Although there is no cure for COPD, you can slow its progress. Following your treatment plan and taking care of yourself can help you feel better and live longer. Follow-up care is a key part of your treatment and safety. Be sure to make and go to all appointments, and call your doctor if you are having problems. It's also a good idea to know your test results and keep a list of the medicines you take. How can you care for yourself at home? Staying healthy  · Do not smoke. This is the most important step you can take to prevent more damage to your lungs. If you need help quitting, talk to your doctor about stop-smoking programs and medicines. These can increase your chances of quitting for good. · Avoid colds and flu. Get a pneumococcal vaccine shot. If you have had one before, ask your doctor whether you need a second dose. Get the flu vaccine every fall. If you must be around people with colds or the flu, wash your hands often. · Avoid secondhand smoke, air pollution, and high altitudes. Also avoid cold, dry air and hot, humid air. Stay at home with your windows closed when air pollution is bad. Medicines and oxygen therapy  · Take your medicines exactly as prescribed. Call your doctor if you think you are having a problem with your medicine. You may be taking medicines such as:  ? Bronchodilators. These help open your airways and make breathing easier. They are either short-acting (work for 6 to 9 hours) or long-acting (work for 24 hours).  You inhale most muscle mass. · Talk with your doctor if you gain too much weight or if you lose weight without trying. Mental health  · Talk to your family, friends, or a therapist about your feelings. Some people feel frightened, angry, hopeless, helpless, and even guilty. Talking openly about bad feelings can help you cope. If these feelings last, talk to your doctor. When should you call for help? [id number] anytime you think you may need emergency care. For example, call if:  · You have severe trouble breathing. Call your doctor now or seek immediate medical care if:  · You have new or worse trouble breathing. · You cough up blood. · You have a fever. Watch closely for changes in your health, and be sure to contact your doctor if:  · You cough more deeply or more often, especially if you notice more mucus or a change in the color of your mucus. · You have new or worse swelling in your legs or belly. · You are not getting better as expected. Where can you learn more? Go to [email or web address]. org and sign in to your [employer] account. Enter C262 in the [employer] box to learn more about \"Chronic Obstructive Pulmonary Disease (COPD): Care Instructions. \"     If you do not have an account, please click on the \"Sign Up Now\" link. Current as of: February 24, 2020               Content Version: 12.5  © 7355-5809 Healthwise, Incorporated. Care instructions adapted under license by [facility] ([facility]). If you have questions about a medical condition or this instruction, always ask your healthcare professional. [place] [address] any warranty or liability for your use of this information.

## 2020-09-04 NOTE — PROGRESS NOTES
SUBJECTIVE  Lisa Davis  is a 63 y.o. female. HPI/Chief C/O:  Chief Complaint   Patient presents with    Discuss Labs     Pt here to review lab results     Allergies   Allergen Reactions    Rifampin Other (See Comments)     States this medicine caused Renal Failure   The patient is here for a medication list and treatment planning review  We will go over our care planning goals as well as take care of all refills  We will set up labs as well   Acute renal issues ( follow up nephrology )  M. A.C. ( follow pulmonary )    Hypertension   Associated symptoms include malaise/fatigue. Pertinent negatives include no anxiety, blurred vision, chest pain, headaches, neck pain, orthopnea, palpitations, peripheral edema, PND, shortness of breath or sweats. Risk factors for coronary artery disease include post-menopausal state. Past treatments include lifestyle changes. The current treatment provides significant improvement. Compliance problems include diet and exercise. There is no history of angina, kidney disease, CAD/MI, CVA, heart failure, left ventricular hypertrophy, PVD or retinopathy. There is no history of chronic renal disease, coarctation of the aorta, hyperaldosteronism, hypercortisolism, hyperparathyroidism, a hypertension causing med, pheochromocytoma, renovascular disease, sleep apnea or a thyroid problem. ROS:  Review of Systems   Constitutional: Positive for malaise/fatigue. Negative for activity change, appetite change, chills, diaphoresis, fatigue, fever and unexpected weight change. HENT: Negative. Negative for congestion, dental problem, drooling, ear discharge, ear pain, facial swelling, hearing loss, mouth sores, nosebleeds, postnasal drip, rhinorrhea, sinus pressure, sinus pain, sneezing, sore throat, tinnitus, trouble swallowing and voice change. Eyes: Negative for blurred vision, photophobia, pain, discharge, redness, itching and visual disturbance. Respiratory: Negative. Negative for apnea, cough, choking, chest tightness, shortness of breath, wheezing and stridor. Cardiovascular: Negative. Negative for chest pain, palpitations, orthopnea, leg swelling and PND. Gastrointestinal: Negative. Negative for abdominal distention, abdominal pain, anal bleeding, blood in stool, constipation, diarrhea, nausea, rectal pain and vomiting. Endocrine: Negative. Negative for cold intolerance, heat intolerance, polydipsia, polyphagia and polyuria. Genitourinary: Negative. Negative for decreased urine volume, difficulty urinating, dysuria, enuresis, flank pain, frequency, genital sores, hematuria and urgency. Musculoskeletal: Negative. Negative for arthralgias, back pain, gait problem, joint swelling, myalgias, neck pain and neck stiffness. Skin: Negative. Negative for color change, pallor, rash and wound. Allergic/Immunologic: Negative. Negative for environmental allergies, food allergies and immunocompromised state. Neurological: Negative. Negative for dizziness, tremors, seizures, syncope, facial asymmetry, speech difficulty, weakness, light-headedness, numbness and headaches. Hematological: Negative. Negative for adenopathy. Does not bruise/bleed easily. Psychiatric/Behavioral: Negative. Negative for agitation, behavioral problems, confusion, decreased concentration, dysphoric mood, hallucinations, self-injury, sleep disturbance and suicidal ideas. The patient is not nervous/anxious and is not hyperactive.          Past Medical/Surgical Hx;  Reviewed with patient      Diagnosis Date    Bronchiectasis ([facility]     COPD (chronic obstructive pulmonary disease) (HCC)     Mycobacterium avium complex ([facility]      Past Surgical History:   Procedure Laterality Date    BRONCHOSCOPY N/A 12/30/2019    BRONCHOSCOPY ALVEOLAR LAVAGE performed by Analy Hidalgo MD at [facility] ENDOSCOPY       Past Family Hx:  Reviewed with patient      Problem Relation Age of Onset    No Known Problems Mother     No Known Problems Father        Social Hx:  Reviewed with patient  Social History     Tobacco Use    Smoking status: Former Smoker     Packs/day: 1.00     Years: 20.00     Pack years: 20.00     Types: Cigarettes     Start date: 1976     Last attempt to quit: 1997     Years since quittin.6    Smokeless tobacco: Never Used   Substance Use Topics    Alcohol use: No       OBJECTIVE  /80   Pulse 63   Temp 98 °F (36.7 °C) (Temporal)   Resp 18   Ht 5' 2\" (1.575 m)   Wt 104 lb (47.2 kg)   LMP  (LMP Unknown)   SpO2 99%   Breastfeeding No   BMI 19.02 kg/m²     Problem List:  Anu Valverde does not have any pertinent problems on file. PHYS EX:  Physical Exam  Vitals signs and nursing note reviewed. Constitutional:       General: She is not in acute distress. Appearance: Normal appearance. She is well-developed and normal weight. She is not ill-appearing, toxic-appearing or diaphoretic. HENT:      Head: Normocephalic and atraumatic. Right Ear: Tympanic membrane, ear canal and external ear normal. There is no impacted cerumen. Left Ear: Tympanic membrane, ear canal and external ear normal. There is no impacted cerumen. Nose: Nose normal. No congestion or rhinorrhea. Mouth/Throat:      Mouth: Mucous membranes are moist.      Pharynx: No oropharyngeal exudate or posterior oropharyngeal erythema. Eyes:      General: No scleral icterus. Right eye: No discharge. Left eye: No discharge. Extraocular Movements: Extraocular movements intact. Conjunctiva/sclera: Conjunctivae normal.      Pupils: Pupils are equal, round, and reactive to light. Neck:      Musculoskeletal: Normal range of motion and neck supple. No neck rigidity or muscular tenderness. Thyroid: No thyromegaly. Vascular: No carotid bruit or JVD. Trachea: No tracheal deviation. Cardiovascular:      Rate and Rhythm: Normal rate and regular rhythm.       Pulses: Normal pulses. Heart sounds: Normal heart sounds. No murmur. No friction rub. No gallop. Pulmonary:      Effort: Pulmonary effort is normal. No respiratory distress. Breath sounds: Normal breath sounds. No stridor. No wheezing, rhonchi or rales. Chest:      Chest wall: No tenderness. Abdominal:      General: Bowel sounds are normal. There is no distension. Palpations: Abdomen is soft. There is no mass. Tenderness: There is no abdominal tenderness. There is no right CVA tenderness, left CVA tenderness, guarding or rebound. Hernia: No hernia is present. Genitourinary:     Vagina: Normal.   Musculoskeletal: Normal range of motion. General: No swelling, tenderness, deformity or signs of injury. Right lower leg: No edema. Left lower leg: No edema. Lymphadenopathy:      Cervical: No cervical adenopathy. Skin:     General: Skin is warm. Coloration: Skin is not jaundiced or pale. Findings: No bruising, erythema, lesion or rash. Neurological:      General: No focal deficit present. Mental Status: She is alert and oriented to person, place, and time. Cranial Nerves: No cranial nerve deficit. Sensory: No sensory deficit. Motor: No weakness or abnormal muscle tone. Coordination: Coordination normal.      Gait: Gait normal.      Deep Tendon Reflexes: Reflexes are normal and symmetric. Reflexes normal.   Psychiatric:         Mood and Affect: Mood normal.         Behavior: Behavior normal.         Thought Content: Thought content normal.         Judgment: Judgment normal.         ASSESSMENT/PLAN  Kristofer Fontana was seen today for discuss labs. Diagnoses and all orders for this visit:    Pulmonary Mycobacterium avium complex (MAC) infection ([facility]  --follows with pulmonary     Chronic obstructive pulmonary disease, unspecified COPD type ([facility]  -     albuterol sulfate  (90 Base) MCG/ACT inhaler;  Inhale 2 puffs into the lungs every 6 hours as needed for Wheezing  -     ANISH DIGITAL SCREEN W OR WO CAD BILATERAL; Future  --PLAN--aerosol accuneb 1.25 plus chest percussion--Rx      Screening mammogram, encounter for  -     ANISH DIGITAL SCREEN W OR WO CAD BILATERAL; Future        Outpatient Encounter Medications as of 9/4/2020   Medication Sig Dispense Refill    albuterol sulfate  (90 Base) MCG/ACT inhaler Inhale 2 puffs into the lungs every 6 hours as needed for Wheezing 1 Inhaler 3    famotidine (PEPCID) 20 MG tablet Take 1 tablet by mouth 2 times daily 180 tablet 1    ondansetron (ZOFRAN-ODT) 4 MG disintegrating tablet Take 1 tablet by mouth 3 times daily as needed for Nausea or Vomiting 21 tablet 0    vilazodone HCl (VILAZODONE HCL) 40 MG TABS Take 40 mg by mouth daily      ipratropium-albuterol (DUONEB) 0.5-2.5 (3) MG/3ML SOLN nebulizer solution Inhale 3 mLs into the lungs every 4 hours (while awake) . 360 mL 0    [DISCONTINUED] albuterol sulfate  (90 Base) MCG/ACT inhaler Inhale 2 puffs into the lungs every 6 hours as needed for Wheezing 1 Inhaler 1     No facility-administered encounter medications on file as of 9/4/2020. Return in about 3 months (around 12/4/2020).         Reviewed recent labs related to [facility]'s current problems      Discussed importance of regular Health Maintenance follow up  Health Maintenance   Topic    DTaP/Tdap/Td vaccine (1 - Tdap)    Cervical cancer screen     Lipid screen     Breast cancer screen     Shingles Vaccine (1 of 2)    Pneumococcal 0-64 years Vaccine (1 of 1 - PPSV23)    Annual Wellness Visit (AWV)     Flu vaccine (1)    Colon cancer screen colonoscopy     Hepatitis C screen     HIV screen     Hepatitis A vaccine     Hepatitis B vaccine     Hib vaccine     Meningococcal (ACWY) vaccine

## 2020-09-21 NOTE — PROGRESS NOTES
Overdue results letter mailed to patient regarding mammogram order.   Electronically signed by Joseph Driscoll on 9/21/2020 at 9:20 AM

## 2020-09-22 ENCOUNTER — HOSPITAL ENCOUNTER (OUTPATIENT)
Age: 65
Discharge: HOME | End: 2020-09-22
Payer: MEDICARE

## 2020-09-22 LAB
ALBUMIN SERPL-MCNC: 4.4 G/DL (ref 3.5–5.2)
ALP BLD-CCNC: 52 U/L (ref 35–104)
ALT SERPL-CCNC: 10 U/L (ref 0–32)
ANION GAP SERPL CALCULATED.3IONS-SCNC: 10 MMOL/L (ref 7–16)
AST SERPL-CCNC: 19 U/L (ref 0–31)
BASOPHILS ABSOLUTE: 0.06 E9/L (ref 0–0.2)
BASOPHILS RELATIVE PERCENT: 0.9 % (ref 0–2)
BILIRUB SERPL-MCNC: <0.2 MG/DL (ref 0–1.2)
BILIRUBIN URINE: NEGATIVE
BLOOD, URINE: NEGATIVE
BUN BLDV-MCNC: 23 MG/DL (ref 8–23)
CALCIUM SERPL-MCNC: 9.8 MG/DL (ref 8.6–10.2)
CHLORIDE BLD-SCNC: 101 MMOL/L (ref 98–107)
CLARITY: CLEAR
CO2: 28 MMOL/L (ref 22–29)
COLOR: YELLOW
CREAT SERPL-MCNC: 1.4 MG/DL (ref 0.5–1)
EOSINOPHILS ABSOLUTE: 0.13 E9/L (ref 0.05–0.5)
EOSINOPHILS RELATIVE PERCENT: 2 % (ref 0–6)
GFR AFRICAN AMERICAN: 46
GFR NON-AFRICAN AMERICAN: 38 ML/MIN/1.73
GLUCOSE BLD-MCNC: 92 MG/DL (ref 74–99)
GLUCOSE URINE: NEGATIVE MG/DL
HCT VFR BLD CALC: 36 % (ref 34–48)
HEMOGLOBIN: 11.4 G/DL (ref 11.5–15.5)
IMMATURE GRANULOCYTES #: 0.03 E9/L
IMMATURE GRANULOCYTES %: 0.5 % (ref 0–5)
KETONES, URINE: NEGATIVE MG/DL
LEUKOCYTE ESTERASE, URINE: NEGATIVE
LYMPHOCYTES ABSOLUTE: 1.54 E9/L (ref 1.5–4)
LYMPHOCYTES RELATIVE PERCENT: 24.1 % (ref 20–42)
MAGNESIUM: 2.1 MG/DL (ref 1.6–2.6)
MCH RBC QN AUTO: 29.4 PG (ref 26–35)
MCHC RBC AUTO-ENTMCNC: 31.7 % (ref 32–34.5)
MCV RBC AUTO: 92.8 FL (ref 80–99.9)
MONOCYTES ABSOLUTE: 0.56 E9/L (ref 0.1–0.95)
MONOCYTES RELATIVE PERCENT: 8.8 % (ref 2–12)
NEUTROPHILS ABSOLUTE: 4.07 E9/L (ref 1.8–7.3)
NEUTROPHILS RELATIVE PERCENT: 63.7 % (ref 43–80)
NITRITE, URINE: NEGATIVE
PDW BLD-RTO: 12.2 FL (ref 11.5–15)
PH UA: 6 (ref 5–9)
PHOSPHORUS: 4.6 MG/DL (ref 2.5–4.5)
PLATELET # BLD: 253 E9/L (ref 130–450)
PMV BLD AUTO: 8.7 FL (ref 7–12)
POTASSIUM SERPL-SCNC: 5 MMOL/L (ref 3.5–5)
PROTEIN UA: NEGATIVE MG/DL
RBC # BLD: 3.88 E12/L (ref 3.5–5.5)
SODIUM BLD-SCNC: 139 MMOL/L (ref 132–146)
SPECIFIC GRAVITY UA: 1.02 (ref 1–1.03)
TOTAL PROTEIN: 7.6 G/DL (ref 6.4–8.3)
UROBILINOGEN, URINE: 0.2 E.U./DL
WBC # BLD: 6.4 E9/L (ref 4.5–11.5)

## 2020-09-22 PROCEDURE — 80053 COMPREHEN METABOLIC PANEL: CPT

## 2020-09-22 PROCEDURE — 83550 IRON BINDING TEST: CPT

## 2020-09-22 PROCEDURE — 82570 ASSAY OF URINE CREATININE: CPT

## 2020-09-22 PROCEDURE — 81003 URINALYSIS AUTO W/O SCOPE: CPT

## 2020-09-22 PROCEDURE — 36415 COLL VENOUS BLD VENIPUNCTURE: CPT

## 2020-09-22 PROCEDURE — 82306 VITAMIN D 25 HYDROXY: CPT

## 2020-09-22 PROCEDURE — 83540 ASSAY OF IRON: CPT

## 2020-09-22 PROCEDURE — 84156 ASSAY OF PROTEIN URINE: CPT

## 2020-09-22 PROCEDURE — 85025 COMPLETE CBC W/AUTO DIFF WBC: CPT

## 2020-09-22 PROCEDURE — 83970 ASSAY OF PARATHORMONE: CPT

## 2020-09-22 PROCEDURE — 83735 ASSAY OF MAGNESIUM: CPT

## 2020-09-22 PROCEDURE — 82728 ASSAY OF FERRITIN: CPT

## 2020-09-22 PROCEDURE — 84100 ASSAY OF PHOSPHORUS: CPT

## 2020-09-23 LAB
CREATININE URINE: 60 MG/DL (ref 29–226)
FERRITIN: 13 NG/ML
IRON SATURATION: 15 % (ref 15–50)
IRON: 39 MCG/DL (ref 37–145)
PARATHYROID HORMONE INTACT: 38 PG/ML (ref 15–65)
PROTEIN PROTEIN: 5 MG/DL (ref 0–12)
PROTEIN/CREAT RATIO: 0.1
PROTEIN/CREAT RATIO: 0.1 (ref 0–0.2)
TOTAL IRON BINDING CAPACITY: 266 MCG/DL (ref 250–450)
VITAMIN D 25-HYDROXY: 37 NG/ML (ref 30–100)

## 2020-12-18 ENCOUNTER — TELEPHONE (OUTPATIENT)
Dept: ADMINISTRATIVE | Age: 65
End: 2020-12-18

## 2020-12-18 ENCOUNTER — HOSPITAL ENCOUNTER (OUTPATIENT)
Age: 65
Discharge: HOME | End: 2020-12-20
Payer: MEDICARE

## 2020-12-18 PROCEDURE — U0003 INFECTIOUS AGENT DETECTION BY NUCLEIC ACID (DNA OR RNA); SEVERE ACUTE RESPIRATORY SYNDROME CORONAVIRUS 2 (SARS-COV-2) (CORONAVIRUS DISEASE [COVID-19]), AMPLIFIED PROBE TECHNIQUE, MAKING USE OF HIGH THROUGHPUT TECHNOLOGIES AS DESCRIBED BY CMS-2020-01-R: HCPCS

## 2020-12-18 RX ORDER — FAMOTIDINE 20 MG/1
TABLET, FILM COATED ORAL
Qty: 180 TABLET | Refills: 1 | Status: SHIPPED
Start: 2020-12-18 | End: 2021-05-27 | Stop reason: SDUPTHER

## 2020-12-18 NOTE — TELEPHONE ENCOUNTER
Patient notified. She will go to the [facility] for testing. Orders placed.   Electronically signed by Casey Núñez on 12/18/2020 at 11:04 AM

## 2020-12-18 NOTE — TELEPHONE ENCOUNTER
Pt called c/o chills, body aches all over and fatigue. Pt stated she has been exposed to [employer] and would like an order to be tested. Please advise Pt.

## 2020-12-20 LAB
SARS-COV-2: NOT DETECTED
SOURCE: NORMAL

## 2021-01-05 ENCOUNTER — VIRTUAL VISIT (OUTPATIENT)
Dept: FAMILY MEDICINE CLINIC | Age: 66
End: 2021-01-05
Payer: MEDICARE

## 2021-01-05 ENCOUNTER — HOSPITAL ENCOUNTER (INPATIENT)
Age: 66
LOS: 5 days | Discharge: HOME | DRG: 871 | End: 2021-01-10
Attending: EMERGENCY MEDICINE | Admitting: INTERNAL MEDICINE
Payer: MEDICARE

## 2021-01-05 ENCOUNTER — APPOINTMENT (OUTPATIENT)
Dept: GENERAL RADIOLOGY | Age: 66
DRG: 871 | End: 2021-01-05
Payer: MEDICARE

## 2021-01-05 ENCOUNTER — APPOINTMENT (OUTPATIENT)
Dept: CT IMAGING | Age: 66
DRG: 871 | End: 2021-01-05
Payer: MEDICARE

## 2021-01-05 DIAGNOSIS — J44.1 COPD EXACERBATION (HCC): ICD-10-CM

## 2021-01-05 DIAGNOSIS — J44.9 CHRONIC OBSTRUCTIVE PULMONARY DISEASE, UNSPECIFIED COPD TYPE (HCC): ICD-10-CM

## 2021-01-05 DIAGNOSIS — A41.9 SEPTICEMIA (HCC): Primary | ICD-10-CM

## 2021-01-05 DIAGNOSIS — A31.0 PULMONARY MYCOBACTERIUM AVIUM COMPLEX (MAC) INFECTION (HCC): ICD-10-CM

## 2021-01-05 DIAGNOSIS — B34.9 VIRAL ILLNESS: Primary | ICD-10-CM

## 2021-01-05 DIAGNOSIS — J18.9 PNEUMONIA DUE TO ORGANISM: ICD-10-CM

## 2021-01-05 PROBLEM — R65.21 SEPTIC SHOCK DUE TO METHICILLIN RESISTANT STAPHYLOCOCCUS AUREUS (HCC): Status: ACTIVE | Noted: 2021-01-05

## 2021-01-05 PROBLEM — A41.02 SEPTIC SHOCK DUE TO METHICILLIN RESISTANT STAPHYLOCOCCUS AUREUS (HCC): Status: ACTIVE | Noted: 2021-01-05

## 2021-01-05 PROBLEM — R65.21 SEPTIC SHOCK (HCC): Status: ACTIVE | Noted: 2021-01-05

## 2021-01-05 LAB
ANION GAP SERPL CALCULATED.3IONS-SCNC: 7 MMOL/L (ref 7–16)
BASOPHILS ABSOLUTE: 0.05 E9/L (ref 0–0.2)
BASOPHILS RELATIVE PERCENT: 0.3 % (ref 0–2)
BUN BLDV-MCNC: 14 MG/DL (ref 8–23)
CALCIUM SERPL-MCNC: 9.4 MG/DL (ref 8.6–10.2)
CHLORIDE BLD-SCNC: 99 MMOL/L (ref 98–107)
CO2: 27 MMOL/L (ref 22–29)
CREAT SERPL-MCNC: 1 MG/DL (ref 0.5–1)
EKG ATRIAL RATE: 109 BPM
EKG P AXIS: 69 DEGREES
EKG P-R INTERVAL: 162 MS
EKG Q-T INTERVAL: 338 MS
EKG QRS DURATION: 126 MS
EKG QTC CALCULATION (BAZETT): 455 MS
EKG R AXIS: 24 DEGREES
EKG T AXIS: 139 DEGREES
EKG VENTRICULAR RATE: 109 BPM
EOSINOPHILS ABSOLUTE: 0.01 E9/L (ref 0.05–0.5)
EOSINOPHILS RELATIVE PERCENT: 0.1 % (ref 0–6)
GFR AFRICAN AMERICAN: >60
GFR NON-AFRICAN AMERICAN: 56 ML/MIN/1.73
GLUCOSE BLD-MCNC: 116 MG/DL (ref 74–99)
HCT VFR BLD CALC: 37.5 % (ref 34–48)
HEMOGLOBIN: 11.7 G/DL (ref 11.5–15.5)
IMMATURE GRANULOCYTES #: 0.07 E9/L
IMMATURE GRANULOCYTES %: 0.5 % (ref 0–5)
LACTIC ACID: 0.7 MMOL/L (ref 0.5–2.2)
LACTIC ACID: 1.7 MMOL/L (ref 0.5–2.2)
LYMPHOCYTES ABSOLUTE: 0.9 E9/L (ref 1.5–4)
LYMPHOCYTES RELATIVE PERCENT: 5.9 % (ref 20–42)
MCH RBC QN AUTO: 29.5 PG (ref 26–35)
MCHC RBC AUTO-ENTMCNC: 31.2 % (ref 32–34.5)
MCV RBC AUTO: 94.7 FL (ref 80–99.9)
MONOCYTES ABSOLUTE: 0.94 E9/L (ref 0.1–0.95)
MONOCYTES RELATIVE PERCENT: 6.1 % (ref 2–12)
NEUTROPHILS ABSOLUTE: 13.4 E9/L (ref 1.8–7.3)
NEUTROPHILS RELATIVE PERCENT: 87.1 % (ref 43–80)
PDW BLD-RTO: 14.2 FL (ref 11.5–15)
PLATELET # BLD: 285 E9/L (ref 130–450)
PMV BLD AUTO: 9 FL (ref 7–12)
POTASSIUM REFLEX MAGNESIUM: 5.4 MMOL/L (ref 3.5–5)
RBC # BLD: 3.96 E12/L (ref 3.5–5.5)
SARS-COV-2, NAAT: NOT DETECTED
SODIUM BLD-SCNC: 133 MMOL/L (ref 132–146)
TROPONIN: <0.01 NG/ML (ref 0–0.03)
WBC # BLD: 15.4 E9/L (ref 4.5–11.5)

## 2021-01-05 PROCEDURE — 2500000003 HC RX 250 WO HCPCS: Performed by: EMERGENCY MEDICINE

## 2021-01-05 PROCEDURE — 71045 X-RAY EXAM CHEST 1 VIEW: CPT

## 2021-01-05 PROCEDURE — 96367 TX/PROPH/DG ADDL SEQ IV INF: CPT

## 2021-01-05 PROCEDURE — 2580000003 HC RX 258: Performed by: EMERGENCY MEDICINE

## 2021-01-05 PROCEDURE — 2580000003 HC RX 258: Performed by: STUDENT IN AN ORGANIZED HEALTH CARE EDUCATION/TRAINING PROGRAM

## 2021-01-05 PROCEDURE — 6360000002 HC RX W HCPCS: Performed by: EMERGENCY MEDICINE

## 2021-01-05 PROCEDURE — 84484 ASSAY OF TROPONIN QUANT: CPT

## 2021-01-05 PROCEDURE — 99284 EMERGENCY DEPT VISIT MOD MDM: CPT

## 2021-01-05 PROCEDURE — 96375 TX/PRO/DX INJ NEW DRUG ADDON: CPT

## 2021-01-05 PROCEDURE — 93005 ELECTROCARDIOGRAM TRACING: CPT | Performed by: NURSE PRACTITIONER

## 2021-01-05 PROCEDURE — 6370000000 HC RX 637 (ALT 250 FOR IP): Performed by: EMERGENCY MEDICINE

## 2021-01-05 PROCEDURE — 96365 THER/PROPH/DIAG IV INF INIT: CPT

## 2021-01-05 PROCEDURE — 96366 THER/PROPH/DIAG IV INF ADDON: CPT

## 2021-01-05 PROCEDURE — 36556 INSERT NON-TUNNEL CV CATH: CPT

## 2021-01-05 PROCEDURE — 87040 BLOOD CULTURE FOR BACTERIA: CPT

## 2021-01-05 PROCEDURE — 02HV33Z INSERTION OF INFUSION DEVICE INTO SUPERIOR VENA CAVA, PERCUTANEOUS APPROACH: ICD-10-PCS | Performed by: EMERGENCY MEDICINE

## 2021-01-05 PROCEDURE — 96361 HYDRATE IV INFUSION ADD-ON: CPT

## 2021-01-05 PROCEDURE — 99442 PR PHYS/QHP TELEPHONE EVALUATION 11-20 MIN: CPT | Performed by: FAMILY MEDICINE

## 2021-01-05 PROCEDURE — 85025 COMPLETE CBC W/AUTO DIFF WBC: CPT

## 2021-01-05 PROCEDURE — 99223 1ST HOSP IP/OBS HIGH 75: CPT | Performed by: INTERNAL MEDICINE

## 2021-01-05 PROCEDURE — 6360000004 HC RX CONTRAST MEDICATION: Performed by: RADIOLOGY

## 2021-01-05 PROCEDURE — 80048 BASIC METABOLIC PNL TOTAL CA: CPT

## 2021-01-05 PROCEDURE — 94664 DEMO&/EVAL PT USE INHALER: CPT

## 2021-01-05 PROCEDURE — 71275 CT ANGIOGRAPHY CHEST: CPT

## 2021-01-05 PROCEDURE — 87081 CULTURE SCREEN ONLY: CPT

## 2021-01-05 PROCEDURE — 2580000003 HC RX 258: Performed by: NURSE PRACTITIONER

## 2021-01-05 PROCEDURE — 94640 AIRWAY INHALATION TREATMENT: CPT

## 2021-01-05 PROCEDURE — 93010 ELECTROCARDIOGRAM REPORT: CPT | Performed by: INTERNAL MEDICINE

## 2021-01-05 PROCEDURE — 2000000000 HC ICU R&B

## 2021-01-05 PROCEDURE — U0002 COVID-19 LAB TEST NON-CDC: HCPCS

## 2021-01-05 PROCEDURE — 2500000003 HC RX 250 WO HCPCS: Performed by: STUDENT IN AN ORGANIZED HEALTH CARE EDUCATION/TRAINING PROGRAM

## 2021-01-05 PROCEDURE — 83605 ASSAY OF LACTIC ACID: CPT

## 2021-01-05 RX ORDER — ACETAMINOPHEN 325 MG/1
650 TABLET ORAL EVERY 6 HOURS PRN
Status: DISCONTINUED | OUTPATIENT
Start: 2021-01-05 | End: 2021-01-10 | Stop reason: HOSPADM

## 2021-01-05 RX ORDER — ALBUTEROL SULFATE 90 UG/1
2 AEROSOL, METERED RESPIRATORY (INHALATION) EVERY 6 HOURS PRN
Status: DISCONTINUED | OUTPATIENT
Start: 2021-01-05 | End: 2021-01-05 | Stop reason: CLARIF

## 2021-01-05 RX ORDER — 0.9 % SODIUM CHLORIDE 0.9 %
1000 INTRAVENOUS SOLUTION INTRAVENOUS ONCE
Status: COMPLETED | OUTPATIENT
Start: 2021-01-05 | End: 2021-01-05

## 2021-01-05 RX ORDER — POLYETHYLENE GLYCOL 3350 17 G/17G
17 POWDER, FOR SOLUTION ORAL DAILY PRN
Status: DISCONTINUED | OUTPATIENT
Start: 2021-01-05 | End: 2021-01-10 | Stop reason: HOSPADM

## 2021-01-05 RX ORDER — ACETAMINOPHEN 325 MG/1
650 TABLET ORAL ONCE
Status: COMPLETED | OUTPATIENT
Start: 2021-01-05 | End: 2021-01-05

## 2021-01-05 RX ORDER — PROMETHAZINE HYDROCHLORIDE 25 MG/1
12.5 TABLET ORAL EVERY 6 HOURS PRN
Status: DISCONTINUED | OUTPATIENT
Start: 2021-01-05 | End: 2021-01-10 | Stop reason: HOSPADM

## 2021-01-05 RX ORDER — SODIUM CHLORIDE 9 MG/ML
INJECTION, SOLUTION INTRAVENOUS CONTINUOUS
Status: DISCONTINUED | OUTPATIENT
Start: 2021-01-05 | End: 2021-01-10

## 2021-01-05 RX ORDER — SODIUM CHLORIDE 0.9 % (FLUSH) 0.9 %
10 SYRINGE (ML) INJECTION PRN
Status: DISCONTINUED | OUTPATIENT
Start: 2021-01-05 | End: 2021-01-10 | Stop reason: HOSPADM

## 2021-01-05 RX ORDER — SODIUM CHLORIDE 0.9 % (FLUSH) 0.9 %
10 SYRINGE (ML) INJECTION EVERY 12 HOURS SCHEDULED
Status: DISCONTINUED | OUTPATIENT
Start: 2021-01-05 | End: 2021-01-10 | Stop reason: HOSPADM

## 2021-01-05 RX ORDER — FAMOTIDINE 20 MG/1
20 TABLET, FILM COATED ORAL DAILY
Status: DISCONTINUED | OUTPATIENT
Start: 2021-01-06 | End: 2021-01-10 | Stop reason: HOSPADM

## 2021-01-05 RX ORDER — IPRATROPIUM BROMIDE AND ALBUTEROL SULFATE 2.5; .5 MG/3ML; MG/3ML
3 SOLUTION RESPIRATORY (INHALATION) ONCE
Status: COMPLETED | OUTPATIENT
Start: 2021-01-05 | End: 2021-01-05

## 2021-01-05 RX ORDER — VILAZODONE HYDROCHLORIDE 40 MG/1
40 TABLET ORAL DAILY
Status: DISCONTINUED | OUTPATIENT
Start: 2021-01-06 | End: 2021-01-10 | Stop reason: HOSPADM

## 2021-01-05 RX ORDER — ALBUTEROL SULFATE 2.5 MG/3ML
2.5 SOLUTION RESPIRATORY (INHALATION) EVERY 6 HOURS PRN
Status: DISCONTINUED | OUTPATIENT
Start: 2021-01-05 | End: 2021-01-10 | Stop reason: HOSPADM

## 2021-01-05 RX ORDER — KETOROLAC TROMETHAMINE 30 MG/ML
15 INJECTION, SOLUTION INTRAMUSCULAR; INTRAVENOUS ONCE
Status: COMPLETED | OUTPATIENT
Start: 2021-01-05 | End: 2021-01-05

## 2021-01-05 RX ORDER — KETOROLAC TROMETHAMINE 30 MG/ML
15 INJECTION, SOLUTION INTRAMUSCULAR; INTRAVENOUS EVERY 6 HOURS PRN
Status: DISPENSED | OUTPATIENT
Start: 2021-01-05 | End: 2021-01-07

## 2021-01-05 RX ORDER — ACETAMINOPHEN 650 MG/1
650 SUPPOSITORY RECTAL EVERY 6 HOURS PRN
Status: DISCONTINUED | OUTPATIENT
Start: 2021-01-05 | End: 2021-01-10 | Stop reason: HOSPADM

## 2021-01-05 RX ORDER — IPRATROPIUM BROMIDE AND ALBUTEROL SULFATE 2.5; .5 MG/3ML; MG/3ML
3 SOLUTION RESPIRATORY (INHALATION)
Status: DISCONTINUED | OUTPATIENT
Start: 2021-01-06 | End: 2021-01-06

## 2021-01-05 RX ORDER — ONDANSETRON 2 MG/ML
4 INJECTION INTRAMUSCULAR; INTRAVENOUS EVERY 6 HOURS PRN
Status: DISCONTINUED | OUTPATIENT
Start: 2021-01-05 | End: 2021-01-10 | Stop reason: HOSPADM

## 2021-01-05 RX ORDER — FAMOTIDINE 20 MG/1
1 TABLET, FILM COATED ORAL 2 TIMES DAILY
Status: DISCONTINUED | OUTPATIENT
Start: 2021-01-05 | End: 2021-01-05 | Stop reason: DRUGHIGH

## 2021-01-05 RX ADMIN — SODIUM CHLORIDE: 9 INJECTION, SOLUTION INTRAVENOUS at 23:30

## 2021-01-05 RX ADMIN — SODIUM CHLORIDE 1000 ML: 9 INJECTION, SOLUTION INTRAVENOUS at 14:01

## 2021-01-05 RX ADMIN — SODIUM CHLORIDE 1000 ML: 9 INJECTION, SOLUTION INTRAVENOUS at 17:37

## 2021-01-05 RX ADMIN — WATER 1 G: 1 INJECTION INTRAMUSCULAR; INTRAVENOUS; SUBCUTANEOUS at 14:00

## 2021-01-05 RX ADMIN — NOREPINEPHRINE BITARTRATE 2 MCG/MIN: 1 INJECTION, SOLUTION, CONCENTRATE INTRAVENOUS at 20:05

## 2021-01-05 RX ADMIN — Medication 10 ML: at 23:28

## 2021-01-05 RX ADMIN — DOXYCYCLINE 100 MG: 100 INJECTION, POWDER, LYOPHILIZED, FOR SOLUTION INTRAVENOUS at 14:00

## 2021-01-05 RX ADMIN — IOPAMIDOL 75 ML: 755 INJECTION, SOLUTION INTRAVENOUS at 13:04

## 2021-01-05 RX ADMIN — SODIUM CHLORIDE 1000 ML: 9 INJECTION, SOLUTION INTRAVENOUS at 10:54

## 2021-01-05 RX ADMIN — IPRATROPIUM BROMIDE AND ALBUTEROL SULFATE 3 AMPULE: .5; 3 SOLUTION RESPIRATORY (INHALATION) at 14:31

## 2021-01-05 RX ADMIN — KETOROLAC TROMETHAMINE 15 MG: 30 INJECTION, SOLUTION INTRAMUSCULAR at 11:12

## 2021-01-05 RX ADMIN — ACETAMINOPHEN 650 MG: 325 TABLET ORAL at 20:12

## 2021-01-05 ASSESSMENT — ENCOUNTER SYMPTOMS
EYE REDNESS: 0
VOMITING: 0
NAUSEA: 0
COUGH: 1
SHORTNESS OF BREATH: 1
ABDOMINAL PAIN: 0

## 2021-01-05 ASSESSMENT — PAIN DESCRIPTION - ONSET: ONSET: GRADUAL

## 2021-01-05 ASSESSMENT — PAIN DESCRIPTION - DESCRIPTORS: DESCRIPTORS: ACHING;CONSTANT

## 2021-01-05 ASSESSMENT — PAIN DESCRIPTION - ORIENTATION: ORIENTATION: MID

## 2021-01-05 ASSESSMENT — PAIN SCALES - GENERAL
PAINLEVEL_OUTOF10: 7
PAINLEVEL_OUTOF10: 8

## 2021-01-05 NOTE — LETTER
[facility]  [address] [facility] [address]  Phone: [phone]             January 10, 2021    Patient: Haja William   YOB: 1955   Date of Visit: 1/5/2021       To Whom It May Concern: Haja William was seen and treated in our facility  beginning 1/5/2021 until . She {Return to school/sport/work:81365}.       Sincerely,       Rocío Singer RN         Signature:__________________________________

## 2021-01-05 NOTE — Clinical Note
Patient Class: Inpatient [101]   REQUIRED: Diagnosis: Septic shock due to methicillin resistant Staphylococcus aureus ([facility] [[id number]]   Estimated Length of Stay: Estimated stay of more than 2 midnights   Telemetry Bed Required?: Yes

## 2021-01-05 NOTE — ED PROVIDER NOTES
Chief complaint: Chest pain, shortness of breath, chills, body aches and headaches      HPI:  1/5/21, Time: 11:17 AM EST      Shortness of Breath  Severity:  Moderate  Onset quality:  Gradual  Duration:  4 days  Timing:  Constant  Progression:  Worsening  Chronicity:  New  Context: URI    Relieved by:  None tried  Worsened by: Activity  Ineffective treatments:  Inhaler  Associated symptoms: chest pain, cough and headaches    Associated symptoms: no abdominal pain, no rash and no vomiting    Chest pain:     Quality: sharp      Severity:  Moderate    Onset quality:  Gradual    Duration:  1 day    Timing:  Constant    Progression:  Worsening    Chronicity:  New  Cough:     Cough characteristics:  Non-productive    Severity:  Moderate    Onset quality:  Gradual    Duration:  4 hours    Timing:  Constant               Lisa Contreras is a 63 y.o. female presenting to the ED for shortness of breath, chest pain. The patient was sent by her PCP who did a phone conference with her today. The patient began 4 days ago with cough, shortness of breath and chest pain. The patient states \"I feel like I have pneumonia. \"  The patient states the pain is located on the right side of her chest.  Described as sharp, nonradiating. Pain currently rated 7 out of 10. Estimates to better. Nothing is worse. Pain has been constant since onset. The patient does also complain of shortness of breath. Moderate in severity. Worse with activity and exertion. No alleviating factors. The patient is normally on supplemental oxygen at night as needed. She does not normally on any oxygen in the day. Patient does admit to subjective fevers and chills. The patient denies any nausea, vomiting, dysuria, diarrhea or constipation. She has no history of hypertension, hyperlipidemia, diabetes. She is not a smoker.  The patient has no history of DVT or PE, is not on any hormone replacement therapy, denies any active malignancy, recent surgeries Tachycardia rate. Regular rhythm. No murmurs  Chest: no chest wall tenderness  Abdomen: Soft. Non tender. Non distended. No rebound, guarding, or rigidity. No pulsatile masses appreciated. Musculoskeletal: Moves all extremities x 4. Warm and well perfused, no clubbing, cyanosis, or edema. Capillary refill <3 seconds    Skin: warm and dry. No rashes. Neurologic: GCS 15, no gross focal neurologic deficits  Psych: Normal Affect    -------------------------------------------------- RESULTS -------------------------------------------------  I have personally reviewed all laboratory and imaging results for this patient. Results are listed below.      LABS:  Results for orders placed or performed during the hospital encounter of 01/05/21   CBC Auto Differential   Result Value Ref Range    WBC 15.4 (H) 4.5 - 11.5 E9/L    RBC 3.96 3.50 - 5.50 E12/L    Hemoglobin 11.7 11.5 - 15.5 g/dL    Hematocrit 37.5 34.0 - 48.0 %    MCV 94.7 80.0 - 99.9 fL    MCH 29.5 26.0 - 35.0 pg    MCHC 31.2 (L) 32.0 - 34.5 %    RDW 14.2 11.5 - 15.0 fL    Platelets 985 974 - 154 E9/L    MPV 9.0 7.0 - 12.0 fL    Neutrophils % 87.1 (H) 43.0 - 80.0 %    Immature Granulocytes % 0.5 0.0 - 5.0 %    Lymphocytes % 5.9 (L) 20.0 - 42.0 %    Monocytes % 6.1 2.0 - 12.0 %    Eosinophils % 0.1 0.0 - 6.0 %    Basophils % 0.3 0.0 - 2.0 %    Neutrophils Absolute 13.40 (H) 1.80 - 7.30 E9/L    Immature Granulocytes # 0.07 E9/L    Lymphocytes Absolute 0.90 (L) 1.50 - 4.00 E9/L    Monocytes Absolute 0.94 0.10 - 0.95 E9/L    Eosinophils Absolute 0.01 (L) 0.05 - 0.50 E9/L    Basophils Absolute 0.05 0.00 - 0.20 E9/L   Troponin   Result Value Ref Range    Troponin <0.01 0.00 - 0.03 ng/mL   Basic Metabolic Panel w/ Reflex to MG   Result Value Ref Range    Sodium 133 132 - 146 mmol/L    Potassium reflex Magnesium 5.4 (H) 3.5 - 5.0 mmol/L    Chloride 99 98 - 107 mmol/L    CO2 27 22 - 29 mmol/L    Anion Gap 7 7 - 16 mmol/L    Glucose 116 (H) 74 - 99 mg/dL    BUN 14 8 - 23 remarkable for leukocytosis of 15.4, likely secondary to pneumonia, patient with BMP fairly unremarkable, potassium was 0.4 but was hemolyzed, CTA of the chest did demonstrate multifocal pneumonia with dense infiltrate in the right middle lobe. Patient was given doxycycline as well as Rocephin. Patient did have EKG with left bundle branch block, there is mild elevation in the anterior leads but scar Bosa criteria was negative, troponin negative patient's chest pain is sharp and has been present constantly for the last 4 days and more likely consistent with her history of pneumonia. The patient did appear clinically ill. Patient will be admitted for further treatment and evaluation. Re-Evaluations/Consultations:             ED Course as of Jan 05 2039   Tue Jan 05, 2021 1919 Patient became hypotensive while waiting for room. She was given 30/kg normal saline continued to remain hypotensive. Most recent blood pressure is 84/46. She was being admitted for sepsis for pneumonia. Patient is no longer tachycardic. Lactic acid was initially normal.  We will repeat her lactate. She did receive antibiotics prior to admission. Did discuss with her central venous catheter placement which she agreed. Please see separate note. Patient be started on the Levophed for septic shock. Repeat portable chest X ray is pending. [JN]      ED Course User Index  [JN] Chris William DO   Patient is in the bed no acute distress. Results discussed. Patient will be admitted. Spoke with Dr. James Moore he will accept the patient for admission      This patient's ED course included: History, physical examination, reevaluation prior to disposition, labs, imaging, telemetry monitoring, EKG, IV medications      This patient has remained hemodynamically stable during their ED course. Counseling:    The emergency provider has spoken with the patient and discussed todays results, in addition to providing specific details for the plan of care and counseling regarding the diagnosis and prognosis. Questions are answered at this time and they are agreeable with the plan.       --------------------------------- IMPRESSION AND DISPOSITION ---------------------------------    IMPRESSION  1. Septicemia ([facility]    2. Pneumonia due to organism    3. COPD exacerbation ([facility]        DISPOSITION  Disposition: Admit to telemetry  Patient condition is stable        NOTE: This report was transcribed using voice recognition software. Every effort was made to ensure accuracy; however, inadvertent computerized transcription errors may be present         Jens Poole DO  01/05/21 1622    ED Course as of Jan 05 2039   Tue Jan 05, 2021   1919 Patient became hypotensive while waiting for room. She was given 30/kg normal saline continued to remain hypotensive. Most recent blood pressure is 84/46. She was being admitted for sepsis for pneumonia. Patient is no longer tachycardic. Lactic acid was initially normal.  We will repeat her lactate. She did receive antibiotics prior to admission. Did discuss with her central venous catheter placement which she agreed. Please see separate note. Patient be started on the Levophed for septic shock. Repeat portable chest X ray is pending. [JN]      ED Course User Index  [JN] Anderson Shaw DO       PROCEDURE  1/5/21       Time: 1845    CENTRAL LINE INSERTION  Risks, benefits and alternatives (for applicable procedures below) described. Performed By: Taryn Wharton MD    Indication: centrally administered medications. Informed consent: The patient provided verbal consent for this procedure. .  Procedure: After routine sterile preparation, a right 3-Lumen 7F Central Venous Catheter was placed by internal jugular vein approach and secured by standard fashion. Ultrasound Guidance:   used.   Number of Attempts: ONE  Post-procedure Findings: A post procedural chest x-ray  was ordered and showed good line position. Patient tolerated the procedure well. Portable chest x-ray shows central venous catheter in proper position. Consultation was made with Dr. Daljit Jessica who agreed to place the patient in ICU because a septic shock. Patient did get 30+ /kg normal saline prior to starting pressors. Repeat lactic acids pending. Discuss admission plan change with intensivist Dr. Nika García.         Amanda Cantu DO  01/05/21 2040

## 2021-01-05 NOTE — LETTER
[facility] [address]  [address] [facility] [address]  Phone: [phone]    No name on file. January 10, 2021     Patient: Valerie Servin   YOB: 1955   Date of Visit: 1/5/2021       To Whom It May Concern: It is my medical opinion that Valerie Servin may return to work on 1/18/21. If you have any questions or concerns, please don't hesitate to call.     Sincerely,    Cortes Gómez MD

## 2021-01-05 NOTE — H&P
[facility] History and Physical      CHIEF COMPLAINT:  \" I knew I had pneumonia\"  Chest pain, sob, chills, HA, bodyaches     History of Present Illness:     Patient is a 62 yo female patient who follows with PCP Dr. Rico Thompson with a past medical history of MAC,bronchiectasis,  COPD, pneumonia, h/o MATTEO on HD 6 months ago, and chronic resp failure on 2 L at night. Patient presented to the ER with concern for pneumonia. Per pt she started not feeling well approximately 1 week ago. Reporting chest pain, sob, fatigue, sob, chills, Ha, bodyaches. Reporting she felt similar with pneumonia in past. Per pt she does have a history of MAC but reporting she does not tolerate treatment. Also reporting she is not wanting ID to see her in hospital. Reporting she had issue with medications for MAC and required HD for short period of time. She follows with Dr. Aguilar Counts pulmonology, but has not been able to get office appointment due to pandemic. Reporting she uses albuterol inhalers. She denies ill contact and reporting she has not been on any antibiotics. Patient awake, alert, resting in ER in no acute distress. RT at bedside giving breathing treatment. Reporting pain with coughing/ inspiration. Denies fevers, nausea, vomiting, dysuria, hematuria, hematochezia. Reporting decreased appetite.         Informant(s) for H&P:patient, chart review     REVIEW OF SYSTEMS:  A comprehensive review of systems was negative except for: what is in the HPI      PMH:  Past Medical History:   Diagnosis Date    Bronchiectasis ([facility]     COPD (chronic obstructive pulmonary disease) ([facility]     [facility]        Surgical History:  Past Surgical History:   Procedure Laterality Date    BRONCHOSCOPY N/A 12/30/2019    BRONCHOSCOPY ALVEOLAR LAVAGE performed by Sandi Holguin MD at [facility] ENDOSCOPY       Medications Prior to Admission:    Prior to Admission medications    Medication Sig Start Date End Date Taking? Authorizing Provider   famotidine (PEPCID) 20 MG tablet TAKE 1 TABLET BY MOUTH TWICE A DAY 12/18/20   Gary John MD   albuterol sulfate  (90 Base) MCG/ACT inhaler Inhale 2 puffs into the lungs every 6 hours as needed for Wheezing 9/4/20   Bettye Lofton DO   vilazodone HCl (VILAZODONE HCL) 40 MG TABS Take 40 mg by mouth daily    Historical Provider, MD   ipratropium-albuterol (DUONEB) 0.5-2.5 (3) MG/3ML SOLN nebulizer solution Inhale 3 mLs into the lungs every 4 hours (while awake) . Patient not taking: Reported on 1/5/2021 1/1/20   Juanjo Alberto DO       Allergies:    Rifampin    Social History:    reports that she quit smoking about 23 years ago. Her smoking use included cigarettes. She started smoking about 44 years ago. She has a 20.00 pack-year smoking history. She has never used smokeless tobacco. She reports that she does not drink alcohol or use drugs.     Family History:   family history includes No Known Problems in her father and mother. =      PHYSICAL EXAM:  Vitals:  BP (!) 107/52   Pulse 88   Temp 97.8 °F (36.6 °C)   Resp 16   Ht 5' 2\" (1.575 m)   Wt 104 lb (47.2 kg)   LMP  (LMP Unknown)   SpO2 100%   BMI 19.02 kg/m²     General Appearance: alert and oriented to person, place and time and in no acute distress  Skin: warm and dry  Head: normocephalic and atraumatic  Neck: neck supple and non tender without mass   Pulmonary/Chest: diminished throughout with rales posterior bases  Cardiovascular: normal rate, normal S1 and S2 and no carotid bruits  Abdomen: soft, non-tender, non-distended, normal bowel sounds, no masses or organomegaly  Extremities: no cyanosis, no clubbing and no edema  Neurologic: speech normal         LABS:  Recent Labs     01/05/21  1046      K 5.4*   CL 99   CO2 27   BUN 14   CREATININE 1.0   GLUCOSE 116*   CALCIUM 9.4       Recent Labs     01/05/21  1046   WBC 15.4*   RBC 3.96   HGB 11.7   HCT 37.5   MCV 94.7   MCH 29.5   MCHC 31.2* RDW 14.2      MPV 9.0       No results for input(s): POCGLU in the last 72 hours. Radiology:   CTA CHEST W CONTRAST   Final Result   1. There is no evidence of a pulmonary embolus. 2. Multifocal bilateral pneumonia more prominent within the right lung. 3. Dense infiltrates within the right middle lobe with traction   bronchiectasis. 4. Multiple cavitary lesion seen within the right lower lobe. Please note   some of the cavitary lesions were present on the patient's previous CT scan   through the abdomen and pelvis dated 06/06/2020. These findings are felt to   represent an infectious/inflammatory process and not cavitary malignancy. XR CHEST PORTABLE   Final Result   Increasing inflammatory change on the right with bronchiectasis. See above. ASSESSMENT:      Active Problems:    Sepsis ([address]  Resolved Problems:    * No resolved hospital problems. *      PLAN:    1. Sepsis criteria: leukocytosis 15.2 and tachycardia- HR 120s likely related to pneumonia. Continue antibiotics and monitor     2. Acute on chronic respiratory failure with hypoxia: pt wears 2 L 02 at night. She follows with Dr. Sindi Polanco. Noted to be 85% on RA. CTA chest reviewed. COVID 19 neg. Consult pulmonology. Continue antibiotics. Check procal. Pt refusing ID consult. 3. Pneumonia: h/o MAC: check procal. Reporting she does not tolerate MAC treatment and does not want. H/o MATTEO on HD short period of time from treatment. Pulmonology consult. Continue antibiotics. 4. Hyperkalemia: repeat    5. H/o COPD/ bronchiectasis        Code Status: FULL  DVT prophylaxis: lovenox      NOTE: This report was transcribed using voice recognition software. Every effort was made to ensure accuracy; however, inadvertent computerized transcription errors may be present.   Electronically signed by LISA Sen on 1/5/2021 at 2:16 PM   HOSPITALIST ATTENDING PHYSICIAN NOTE 1/5/2021 1928PM:    Details of the evaluation - subjective assessment (including medication profile, past medical, family and social history when applicable), examination, review of lab and test data, diagnostic impressions and medical decision making - performed by LISA Hernandez, were discussed with me on the date of service and I agree with clinical information herein unless otherwise noted. The patient has been evaluated by me personally earlier today. Pt reports no fevers, chills,n/v. PHYSICAL EXAM:    Vitals:  BP (!) 84/46   Pulse 84   Temp 98.9 °F (37.2 °C) (Oral)   Resp 16   Ht 5' 2\" (1.575 m)   Wt 104 lb (47.2 kg)   LMP  (LMP Unknown)   SpO2 100%   BMI 19.02 kg/m²     General:  Appears comfortable. Answers questions appropriately and cooperative with exam  HEENT:  Mucous membranes moist. No erythema, rhinorrhea, or post-nasal drip noted. Neck:  No carotid bruits. Heart:  Rhythm regular at rate of 88  Lungs:  CTA. No wheeze, rales, or rhonchi  Abdomen:  Positive bowel sounds positive. Soft. Non-tender. No guarding, rebound or rigidity. Breast/Rectal/Genitourinary: not pertinent. Extremities:  Negative for lower extremity edema  Skin:  Warm and dry  Vascular: 2/4 Dorsalis Pedis pulses bilaterally. Neuro:  Cranial nerves 2-12 grossly intact, no focal weakness or change in sensation noted. Extraocular muscles intact. Pupils equal, round, reactive to light. I agree with the assessment and plan of LISA Hernandez. Sepsis  Acute on chronic respiratory failure with hypoxia  Pneumonia   Hyperglycemia likely due to stress  Copd/bronchiectasis  H/o MAC        Electronically signed by Martin Jean D.O.   Hospitalist  [facility] at [facility]

## 2021-01-05 NOTE — PROGRESS NOTES
Haja William is a 63 y.o. female evaluated via telephone on 1/5/2021. Consent:  She and/or health care decision maker is aware that that she may receive a bill for this telephone service, depending on her insurance coverage, and has provided verbal consent to proceed: Yes    Chief Complaint   Patient presents with    Cough     Patient states she has pneumonia    Chest Pain    Shortness of Breath    Headache     Symptoms x 2 days, worsening quickly.  Generalized Body Aches       Documentation:  I communicated with the patient and/or health care decision maker about illness. She notes getting sick Saturday and feeling absolutely terrible. She has a significant history of MAC and COPD and follows with Pulmonology. She denies having a pulse ox at home to check her saturation. She reports feeling very winded and notes having chest pressure, pain, cough, and congestion. She is a [place] and had exposure to someone with Covid 3 weeks ago, test reported to be negative at that time. Other symptoms include muscle aches and headache. Details of this discussion including any medical advice provided: Secondary to past medical history and current symptoms patient advised to go to the ED for stat evaluation. She reports she will go now. Declines ambulance and reports her daughter will take her. Patient will go to [facility] ED. Report called. I affirm this is a Patient Initiated Episode with a Patient who has not had a related appointment within my department in the past 7 days or scheduled within the next 24 hours.     Patient identification was verified at the start of the visit: Yes    Total Time: minutes: 11-20 minutes    Note: not billable if this call serves to triage the patient into an appointment for the relevant concern      [facility]

## 2021-01-06 LAB
ADENOVIRUS BY PCR: NOT DETECTED
ANION GAP SERPL CALCULATED.3IONS-SCNC: 5 MMOL/L (ref 7–16)
BORDETELLA PARAPERTUSSIS BY PCR: NOT DETECTED
BORDETELLA PERTUSSIS BY PCR: NOT DETECTED
BUN BLDV-MCNC: 12 MG/DL (ref 8–23)
CALCIUM SERPL-MCNC: 8.5 MG/DL (ref 8.6–10.2)
CHLAMYDOPHILIA PNEUMONIAE BY PCR: NOT DETECTED
CHLORIDE BLD-SCNC: 109 MMOL/L (ref 98–107)
CO2: 23 MMOL/L (ref 22–29)
CORONAVIRUS 229E BY PCR: NOT DETECTED
CORONAVIRUS HKU1 BY PCR: NOT DETECTED
CORONAVIRUS NL63 BY PCR: NOT DETECTED
CORONAVIRUS OC43 BY PCR: NOT DETECTED
CREAT SERPL-MCNC: 1 MG/DL (ref 0.5–1)
GFR AFRICAN AMERICAN: >60
GFR NON-AFRICAN AMERICAN: 56 ML/MIN/1.73
GLUCOSE BLD-MCNC: 97 MG/DL (ref 74–99)
HCT VFR BLD CALC: 32.8 % (ref 34–48)
HEMOGLOBIN: 9.9 G/DL (ref 11.5–15.5)
HUMAN METAPNEUMOVIRUS BY PCR: NOT DETECTED
HUMAN RHINOVIRUS/ENTEROVIRUS BY PCR: NOT DETECTED
INFLUENZA A BY PCR: NOT DETECTED
INFLUENZA B BY PCR: NOT DETECTED
L. PNEUMOPHILA SEROGP 1 UR AG: NORMAL
LACTIC ACID: 0.6 MMOL/L (ref 0.5–2.2)
MAGNESIUM: 2 MG/DL (ref 1.6–2.6)
MCH RBC QN AUTO: 28.7 PG (ref 26–35)
MCHC RBC AUTO-ENTMCNC: 30.2 % (ref 32–34.5)
MCV RBC AUTO: 95.1 FL (ref 80–99.9)
MYCOPLASMA PNEUMONIAE BY PCR: NOT DETECTED
PARAINFLUENZA VIRUS 1 BY PCR: NOT DETECTED
PARAINFLUENZA VIRUS 2 BY PCR: NOT DETECTED
PARAINFLUENZA VIRUS 3 BY PCR: NOT DETECTED
PARAINFLUENZA VIRUS 4 BY PCR: NOT DETECTED
PDW BLD-RTO: 14.3 FL (ref 11.5–15)
PLATELET # BLD: 244 E9/L (ref 130–450)
PMV BLD AUTO: 9 FL (ref 7–12)
POTASSIUM REFLEX MAGNESIUM: 4 MMOL/L (ref 3.5–5)
POTASSIUM SERPL-SCNC: 3.9 MMOL/L (ref 3.5–5)
RBC # BLD: 3.45 E12/L (ref 3.5–5.5)
RESPIRATORY SYNCYTIAL VIRUS BY PCR: NOT DETECTED
SARS-COV-2, PCR: NOT DETECTED
SODIUM BLD-SCNC: 137 MMOL/L (ref 132–146)
STREP PNEUMONIAE ANTIGEN, URINE: NORMAL
WBC # BLD: 13.4 E9/L (ref 4.5–11.5)

## 2021-01-06 PROCEDURE — 36592 COLLECT BLOOD FROM PICC: CPT

## 2021-01-06 PROCEDURE — 2500000003 HC RX 250 WO HCPCS: Performed by: NURSE PRACTITIONER

## 2021-01-06 PROCEDURE — 6370000000 HC RX 637 (ALT 250 FOR IP): Performed by: NURSE PRACTITIONER

## 2021-01-06 PROCEDURE — 6370000000 HC RX 637 (ALT 250 FOR IP): Performed by: INTERNAL MEDICINE

## 2021-01-06 PROCEDURE — 87206 SMEAR FLUORESCENT/ACID STAI: CPT

## 2021-01-06 PROCEDURE — 84132 ASSAY OF SERUM POTASSIUM: CPT

## 2021-01-06 PROCEDURE — 94640 AIRWAY INHALATION TREATMENT: CPT

## 2021-01-06 PROCEDURE — 87449 NOS EACH ORGANISM AG IA: CPT

## 2021-01-06 PROCEDURE — 2060000000 HC ICU INTERMEDIATE R&B

## 2021-01-06 PROCEDURE — 6360000002 HC RX W HCPCS: Performed by: INTERNAL MEDICINE

## 2021-01-06 PROCEDURE — 2580000003 HC RX 258: Performed by: INTERNAL MEDICINE

## 2021-01-06 PROCEDURE — 2580000003 HC RX 258: Performed by: NURSE PRACTITIONER

## 2021-01-06 PROCEDURE — 87070 CULTURE OTHR SPECIMN AEROBIC: CPT

## 2021-01-06 PROCEDURE — 80048 BASIC METABOLIC PNL TOTAL CA: CPT

## 2021-01-06 PROCEDURE — 83735 ASSAY OF MAGNESIUM: CPT

## 2021-01-06 PROCEDURE — 85027 COMPLETE CBC AUTOMATED: CPT

## 2021-01-06 PROCEDURE — 36415 COLL VENOUS BLD VENIPUNCTURE: CPT

## 2021-01-06 PROCEDURE — 0202U NFCT DS 22 TRGT SARS-COV-2: CPT

## 2021-01-06 PROCEDURE — 99233 SBSQ HOSP IP/OBS HIGH 50: CPT | Performed by: INTERNAL MEDICINE

## 2021-01-06 PROCEDURE — 6360000002 HC RX W HCPCS: Performed by: NURSE PRACTITIONER

## 2021-01-06 PROCEDURE — 2500000003 HC RX 250 WO HCPCS: Performed by: INTERNAL MEDICINE

## 2021-01-06 PROCEDURE — 83605 ASSAY OF LACTIC ACID: CPT

## 2021-01-06 RX ORDER — ALBUTEROL SULFATE 2.5 MG/3ML
2.5 SOLUTION RESPIRATORY (INHALATION)
Status: DISCONTINUED | OUTPATIENT
Start: 2021-01-06 | End: 2021-01-10 | Stop reason: HOSPADM

## 2021-01-06 RX ORDER — SODIUM CHLORIDE FOR INHALATION 3 %
4 VIAL, NEBULIZER (ML) INHALATION
Status: DISCONTINUED | OUTPATIENT
Start: 2021-01-06 | End: 2021-01-10 | Stop reason: HOSPADM

## 2021-01-06 RX ADMIN — DOXYCYCLINE 100 MG: 100 INJECTION, POWDER, LYOPHILIZED, FOR SOLUTION INTRAVENOUS at 01:52

## 2021-01-06 RX ADMIN — ACETAMINOPHEN 650 MG: 325 TABLET ORAL at 15:19

## 2021-01-06 RX ADMIN — WATER 1 G: 1 INJECTION INTRAMUSCULAR; INTRAVENOUS; SUBCUTANEOUS at 15:25

## 2021-01-06 RX ADMIN — ACETAMINOPHEN 650 MG: 325 TABLET ORAL at 08:19

## 2021-01-06 RX ADMIN — Medication 10 ML: at 20:33

## 2021-01-06 RX ADMIN — ALBUTEROL SULFATE 2.5 MG: 2.5 SOLUTION RESPIRATORY (INHALATION) at 12:08

## 2021-01-06 RX ADMIN — IPRATROPIUM BROMIDE AND ALBUTEROL SULFATE 3 ML: .5; 3 SOLUTION RESPIRATORY (INHALATION) at 07:33

## 2021-01-06 RX ADMIN — ACETAMINOPHEN 650 MG: 325 TABLET ORAL at 20:39

## 2021-01-06 RX ADMIN — SODIUM CHLORIDE: 9 INJECTION, SOLUTION INTRAVENOUS at 16:43

## 2021-01-06 RX ADMIN — Medication 10 ML: at 08:24

## 2021-01-06 RX ADMIN — FAMOTIDINE 20 MG: 20 TABLET, FILM COATED ORAL at 08:19

## 2021-01-06 RX ADMIN — KETOROLAC TROMETHAMINE 15 MG: 30 INJECTION, SOLUTION INTRAMUSCULAR at 16:43

## 2021-01-06 RX ADMIN — SODIUM CHLORIDE SOLN NEBU 3% 4 ML: 3 NEBU SOLN at 12:08

## 2021-01-06 RX ADMIN — KETOROLAC TROMETHAMINE 15 MG: 30 INJECTION, SOLUTION INTRAMUSCULAR at 05:14

## 2021-01-06 RX ADMIN — DOXYCYCLINE 100 MG: 100 INJECTION, POWDER, LYOPHILIZED, FOR SOLUTION INTRAVENOUS at 15:19

## 2021-01-06 RX ADMIN — ENOXAPARIN SODIUM 40 MG: 40 INJECTION SUBCUTANEOUS at 08:19

## 2021-01-06 RX ADMIN — VILAZODONE HYDROCHLORIDE 40 MG: 40 TABLET ORAL at 08:19

## 2021-01-06 ASSESSMENT — PAIN - FUNCTIONAL ASSESSMENT: PAIN_FUNCTIONAL_ASSESSMENT: PREVENTS OR INTERFERES SOME ACTIVE ACTIVITIES AND ADLS

## 2021-01-06 ASSESSMENT — PAIN DESCRIPTION - LOCATION
LOCATION: HEAD;CHEST
LOCATION: HEAD;CHEST
LOCATION: HEAD

## 2021-01-06 ASSESSMENT — PAIN SCALES - GENERAL
PAINLEVEL_OUTOF10: 8
PAINLEVEL_OUTOF10: 2
PAINLEVEL_OUTOF10: 3
PAINLEVEL_OUTOF10: 9
PAINLEVEL_OUTOF10: 9
PAINLEVEL_OUTOF10: 4

## 2021-01-06 ASSESSMENT — PAIN DESCRIPTION - DESCRIPTORS
DESCRIPTORS: ACHING;DISCOMFORT
DESCRIPTORS: ACHING;DISCOMFORT;THROBBING
DESCRIPTORS: ACHING;DISCOMFORT;THROBBING
DESCRIPTORS: ACHING;DISCOMFORT;SORE

## 2021-01-06 ASSESSMENT — PAIN DESCRIPTION - PAIN TYPE
TYPE: ACUTE PAIN
TYPE: ACUTE PAIN

## 2021-01-06 ASSESSMENT — PAIN DESCRIPTION - ONSET: ONSET: ON-GOING

## 2021-01-06 ASSESSMENT — PAIN DESCRIPTION - PROGRESSION: CLINICAL_PROGRESSION: GRADUALLY WORSENING

## 2021-01-06 NOTE — ED NOTES
Nurse to nurse given to [facility], RN  01/05/21 570 [facility], RN  01/05/21 2124

## 2021-01-06 NOTE — PROGRESS NOTES
Patient admitted from ER to room 217, placed on monitor, patient oriented to room and unit visiting hours. Patient guide at bedside, reviewed patient rights and responsibilities. MRSA nasal swab obtained. Bed alarm on, call light within reach.

## 2021-01-06 NOTE — PLAN OF CARE
Problem: Pain:  Goal: Pain level will decrease  Description: Pain level will decrease  Outcome: Met This Shift  Goal: Control of acute pain  Description: Control of acute pain  Outcome: Met This Shift     Problem: Falls - Risk of:  Goal: Will remain free from falls  Description: Will remain free from falls  Outcome: Met This Shift  Goal: Absence of physical injury  Description: Absence of physical injury  Outcome: Met This Shift     Problem: Pain:  Goal: Pain level will decrease  Description: Pain level will decrease  Outcome: Met This Shift

## 2021-01-06 NOTE — PATIENT CARE CONFERENCE
Intensive Care Daily Quality Rounding Checklist      ICU Team Members: Dr. Ta Hill, Jalil Castro (residents), charge nurse, bedside nurse, clinical pharmacist       ICU Day #: NUMBER: 2    Intubation Date: N/A    Ventilator Day #: N/A    Central Line Insertion Date: January 5        Day #: NUMBER: 2     Arterial Line Insertion Date: N/A      Day #: N/A    Temporary Hemodialysis Catheter Insertion Date: N/A      Day # N/A    DVT Prophylaxis: Lovenox    GI Prophylaxis: Pepcid    Santiago Catheter Insertion Date:  N/A       Day #: N/A    Skin Issues/ Wounds and ordered treatment discussed on rounds: None     Goals/ Plans for the Day: Daily labs, wean O2 as able, consult patient's Pulmonologist, consult ID, transfer to floor

## 2021-01-06 NOTE — CONSULTS
[address] [place] [address] [facility]  [facility]  Consultation Note     Admit Date: 1/5/2021 10:34 AM    Reason for Consult:   History of MAC    Attending Physician:  Bettye Wells DO    HISTORY OF PRESENT ILLNESS:             The history is obtained from extensive review of available past medical records. The patient is a 63 y.o. female who is known to the ID service. The patient was treated for MAC infection of the lung for 2 years back in 2016 by pulmonary just in [place]. She was on Ethambutol, Rifampin and Clarithromycin. She was offered treatment and finally agreed in April 2020. She declined IV Amikacin because of cost and was prescribed inhaled amikacin which she never started. She stopped Rifampin and Ethambutol because she claimed these drugs made her sick. She was admitted to the hospital with acute kidney injury and had hemodialysis. She declined any further treatment for MAC thereafter. The patient had an exposure to SARS-CoV-2 at work 3 weeks prior to the admission. She tested negative. She had a virtual visit with her PCP on 1/5/2020 and was complaining of malaise, shortness of breath, chest pressure, pain, cough and congestion. She reportedly had tested negative for SARS-CoV-2. She was instructed to go to the ED. In the ED she was found to be hypotensive with a blood pressure 110/54 and was afebrile. WBC was 15.4 and a rapid SARS-CoV-2 test was negative. She was treated with Ceftriaxone and IV Doxycycline. Levophed was started and she was admitted to the ICU. Vasopressors were discontinued. The patient is feeling better. Admits to having a slightly productive sputum. Past Medical History:        Diagnosis Date    [facility]     COPD (chronic obstructive pulmonary disease) ([facility]     Mycobacterium avium complex ([facility]      May 2020. Admitted to [facility] feeling sick. She had acute kidney injury and was taking nonsteroidals.   She had recently started Ethambutol and Rifampin for MAC infection of the lung. She was feeling sick and was instructed to stop Rifampin. She was observed off antibiotics since she had declined to take any further treatment for MAC infection. She was placed on hemodialysis. She developed odynophagia and was treated empirically with for Candida esophagitis. She was discharged home and did not follow-up in the office. December 2019. Admitted to the hospital with nonproductive cough, fatigue and diaphoresis. She tested positive for treated with Ceftriaxone, Vancomycin and Zosyn. She tested positive for RSV. Seen by ID. We discontinued antibiotics altogether. She had a bronchoscopy that once again showed Mycobacterium avium complex, susceptible to Amikacin, Clarithromycin, Ethambutol, Moxifloxacin, Rifabutin, Rifampin and intermediate to Linezolid. February 2020 through April 2020. Followed up in the office for the next few months and finally agreed to restart treatment for MAC. She claimed not to be able to tolerate Clarithromycin. She was agreeable to start treatment again in April 2020 with Ethambutol and Rifampin and stopped taking these after a week because of feeling sick. She was also prescribed IV amikacin but declined due to cost.  She was prescribed inhaled Amikacin but she never started it.    2016. Treated with Rifampin, Clarithromycin and Ethambutol for MAC infection of the lung x2 years by the pulmonologist in [place].     Past Surgical History:        Procedure Laterality Date    BRONCHOSCOPY N/A 12/30/2019    BRONCHOSCOPY ALVEOLAR LAVAGE performed by Rodolfo Floyd MD at [facility] ENDOSCOPY     Current Medications:   Scheduled Meds:   albuterol  2.5 mg Nebulization Q4H WA    sodium chloride (Inhalant)  4 mL Nebulization Q4H WA    vilazodone HCl  40 mg Oral Daily    sodium chloride flush  10 mL Intravenous 2 times per day    enoxaparin  40 mg Subcutaneous Daily    cefTRIAXone (ROCEPHIN) IV  1 g Intravenous Q24H    doxycycline (VIBRAMYCIN) IV  100 mg Intravenous Q12H    famotidine  20 mg Oral Daily     Continuous Infusions:   sodium chloride 75 mL/hr at 21 2330     PRN Meds:sodium chloride flush, promethazine **OR** ondansetron, polyethylene glycol, acetaminophen **OR** acetaminophen, ketorolac, albuterol    Allergies:  Rifampin    Social History:   Social History     Socioeconomic History    Marital status:      Spouse name: None    Number of children: None    Years of education: None    Highest education level: None   Occupational History    None   Social Needs    Financial resource strain: None    Food insecurity     Worry: None     Inability: None    Transportation needs     Medical: None     Non-medical: None   Tobacco Use    Smoking status: Former Smoker     Packs/day: 1.00     Years: 20.00     Pack years: 20.00     Types: Cigarettes     Start date: 1976     Quit date: 1997     Years since quittin.0    Smokeless tobacco: Never Used   Substance and Sexual Activity    Alcohol use: No    Drug use: No    Sexual activity: None   Lifestyle    Physical activity     Days per week: None     Minutes per session: None    Stress: None   Relationships    Social connections     Talks on phone: None     Gets together: None     Attends Judaism service: None     Active member of club or organization: None     Attends meetings of clubs or organizations: None     Relationship status: None    Intimate partner violence     Fear of current or ex partner: None     Emotionally abused: None     Physically abused: None     Forced sexual activity: None   Other Topics Concern    None   Social History Narrative    None      Pets: None  Travel: No  The patient lives at home. She works at a [employer] in [email or web address]    Family History:       Problem Relation Age of Onset    No Known Problems Mother     No Known Problems Father    .  Otherwise non-pertinent to the chief complaint. REVIEW OF SYSTEMS:    Constitutional: Negative for fevers, chills, diaphoresis  Neurologic: Negative   Psychiatric: Negative  Rheumatologic: Negative   Endocrine: Negative  Hematologic: Negative  Immunologic: Negative   ENT: Negative  Respiratory: As in the HPI  Cardiovascular: Negative  GI: Negative  : Negative  Musculoskeletal: Negative  Skin: No rashes. PHYSICAL EXAM:    Vitals:   BP (!) 104/53   Pulse 99   Temp 98.6 °F (37 °C) (Axillary)   Resp 26   Ht 5' 2\" (1.575 m)   Wt 104 lb (47.2 kg)   LMP  (LMP Unknown)   SpO2 95%   BMI 19.02 kg/m²   Constitutional: The patient is awake, alert, and oriented. Sitting up in bed in the ICU. No distress. Skin: Warm and dry. No rashes were noted. HEENT: Eyes show round, and reactive pupils. No jaundice. Moist mucous membranes, no ulcerations, no thrush. Neck: Supple to movements. No lymphadenopathy. Chest: No use of accessory muscles to breathe. Symmetrical expansion. Auscultation reveals scattered crackles. Cardiovascular: S1 and S2 are rhythmic and regular. No murmurs appreciated. Abdomen: Positive bowel sounds to auscultation. Benign to palpation. No masses felt. No hepatosplenomegaly. Extremities: No clubbing, no cyanosis, no edema.   Lines: peripheral      CBC+dif:  Recent Labs     01/05/21  1046 01/06/21  0435   WBC 15.4* 13.4*   HGB 11.7 9.9*   HCT 37.5 32.8*   MCV 94.7 95.1    244   NEUTROABS 13.40*  --      Lab Results   Component Value Date    CRP 3.2 (H) 05/22/2020    CRP 0.4 04/30/2020    CRP 0.6 (H) 02/28/2020      No results found for: CRPHS  Lab Results   Component Value Date    SEDRATE 45 (H) 05/22/2020    SEDRATE 55 (H) 04/30/2020    SEDRATE 41 (H) 02/28/2020     Lab Results   Component Value Date    ALT 10 09/22/2020    AST 19 09/22/2020    ALKPHOS 52 09/22/2020    BILITOT <0.2 09/22/2020     Lab Results   Component Value Date     01/06/2021    K 4.0 01/06/2021    K 3.9 01/06/2021     01/06/2021    CO2 23 01/06/2021    BUN 12 01/06/2021    CREATININE 1.0 01/06/2021    GFRAA >60 01/06/2021    LABGLOM 56 01/06/2021    GLUCOSE 97 01/06/2021    PROT 7.6 09/22/2020    LABALBU 4.4 09/22/2020    CALCIUM 8.5 01/06/2021    BILITOT <0.2 09/22/2020    ALKPHOS 52 09/22/2020    AST 19 09/22/2020    ALT 10 09/22/2020       Lab Results   Component Value Date    PROTIME 12.6 12/28/2019    INR 1.1 12/28/2019       No results found for: TSH    Lab Results   Component Value Date    COLORU Yellow 09/22/2020    PHUR 6.0 09/22/2020    CLARITYU Clear 09/22/2020    SPECGRAV 1.020 09/22/2020    LEUKOCYTESUR Negative 09/22/2020    UROBILINOGEN 0.2 09/22/2020    BILIRUBINUR Negative 09/22/2020    BLOODU Negative 09/22/2020    GLUCOSEU Negative 09/22/2020       No results found for: HCO3, BE, O2SAT, PH, THGB, PCO2, PO2, TCO2  Radiology:      Microbiology:  Pending  No results for input(s): BC in the last 72 hours. No results for input(s): ORG in the last 72 hours. No results for input(s): Brisa Humbles in the last 72 hours. Recent Labs     01/06/21  0435   STREPNEUMAGU Presumptive negative- suggests no current or recent  pneumococcal infection. Infection due to Strep pneumoniae cannot be  ruled out since the antigen present in the sample  may be below the detection limit of the test.  Normal Range:Presumptive Negative       Recent Labs     01/06/21  0435   LP1UAG Presumptive Negative -suggesting no recent or current infections  with Legionella pneumophila serogroup 1. Infection to Legionella cannot be ruled out since other serogroups  and species may cause infection, antigen may not be present in  early infection, or level of antigen may be below the  detection limit. Normal Range: Presumptive Negative       No results for input(s): ASO in the last 72 hours. No results for input(s): CULTRESP in the last 72 hours. No results for input(s): PROCAL in the last 72 hours. Assessment:  · MAC infection of the lung.  Treated for 2 years with 3 drugs in 2016 Amikacin. Patient was offered treatment again and declined IV antibiotic because of cost. Prescribed inhaled Amikacin but never started. Eventually treated with Rifampin and Ethambutol. She stopped the medication due to adverse drug reactions and declined any further treatment  · Possible viral lower respiratory tract infection versus worsening of MAC infection  · Possible sepsis with septic shock. Briefly on vasopressors. Plan:    · Continue Doxycycline and Ceftriaxone for now  · Check cultures, baseline ESR, CRP  · Monitor labs  · Will follow with you    Thank you for having us see this patient in consultation. I will be discussing this case with the treating physicians.     Purvi William  12:19 PM  1/6/2021

## 2021-01-06 NOTE — CARE COORDINATION
COVID negative 1/5. Met w/ patient. Explained role of  and plan of care. Lives independently alone in a 3rd floor apartment- drives- has family support. Has nebulizer but does not use, wears home O2 3-4 liters at HS through [address]- currently requiring O2 4lNC- will need O2 testing/ order if unable to wean off at discharge. PCP is Dr. Franklin Livingston and pharmacy is [facility]. Currently on iv abxs. Plan is to return home on discharge-anticipating no needs. Will follow.  Mikey Lyles

## 2021-01-06 NOTE — ED PROVIDER NOTES
Central Line Placement Procedure Note    Indication: vascular access, centrally administered medications and need for frequent blood draws    Consent: The patient was counseled regarding the procedure, its indications, risks, potential complications and alternatives, and any questions were answered. Consent was obtained to proceed. Procedure: The patient was positioned appropriately and the skin over the right internal jugular vein was prepped with Chloraprep and draped in a sterile fashion. Local anesthesia was obtained by infiltration using 1% Lidocaine without epinephrine. A large bore needle was used to identify the vein. A guide wire was then inserted into the vein through the needle. A triple lumen catheter was then inserted into the vessel over the guide wire using the Seldinger technique. All ports showed good, free flowing blood return and were flushed with saline solution. The catheter was then securely fastened to the skin with suture at 17 cm. Two sutures were placed into the proximal eyelets. An antibiotic disk was placed and the site was then covered with a sterile dressing. A post procedure X-ray was ordered and showed good line position. The patient tolerated the procedure well.     Complications: None    Lowell Ibarra MD, PGY 1    Attending physician was present for all key aspects of the procedure             Lowell Ibarra MD  Resident  01/05/21 [id number]

## 2021-01-06 NOTE — CONSULTS
Pulmonary [facility]                             Pulmonary Consult/Progress Note :          Patient: Cezar Curry  MRN: [id number]  : 1955      Date of Admission: .2021 10:34 AM    Consulting Physician:David Juarez         Reason for Consultation:  CC : SOB   HPI:   Cezar Curry is a 63y.o. year old with history of Chronic MAC that she refuse to treat ,also COPD and on home O2 .presneted to the hospital and admitted to ICU on  for sepsis  and she was hypotensive     She was She has been feeling ill for the past few days with fatigue, chills, shortness of breath, cough, L-sided pleuritic chest pain, headaches, and body aches.        Patient was diagnosed with MAC in  and was found to have positive MAC cultures earlier this year. She was briefly treated but then discontinued clarithromycin, ethambutol, and rifampin due to side effects. She was offered IV amikacin but declined due to cost. She was given chest vest but does not use     In the ED she was found to be hypotensive with a blood pressure 110/54 and was afebrile. WBC was 15.4 and a rapid SARS-CoV-2 test was negative. She was treated with Ceftriaxone and IV Doxycycline. Levophed was started and she was admitted to the ICU. Vasopressors were discontinued. The patient is feeling better.  Admits to having a slightly productive sput    PAST MEDICAL HISTORY:   Past Medical History:   Diagnosis Date    [facility]     COPD (chronic obstructive pulmonary disease) ([facility])     Mycobacterium avium complex ([facility] [address] [facility]        PAST SURGICAL HISTORY:   Past Surgical History:   Procedure Laterality Date    BRONCHOSCOPY N/A 2019    BRONCHOSCOPY ALVEOLAR LAVAGE performed by Taqueria Rico MD at [facility] ENDOSCOPY       FAMILY HISTORY:   Family History   Problem Relation Age of Onset    No Known Problems Mother     No Known Problems Father        SOCIAL HISTORY:   Social History     Socioeconomic History    Marital status:      Spouse name: Not on file    Number of children: Not on file    Years of education: Not on file    Highest education level: Not on file   Occupational History    Not on file   Social Needs    Financial resource strain: Not on file    Food insecurity     Worry: Not on file     Inability: Not on file    Transportation needs     Medical: Not on file     Non-medical: Not on file   Tobacco Use    Smoking status: Former Smoker     Packs/day: 1.00     Years: 20.00     Pack years: 20.00     Types: Cigarettes     Start date: 1976     Quit date: 1997     Years since quittin.0    Smokeless tobacco: Never Used   Substance and Sexual Activity    Alcohol use: No    Drug use: No    Sexual activity: Not on file   Lifestyle    Physical activity     Days per week: Not on file     Minutes per session: Not on file    Stress: Not on file   Relationships    Social connections     Talks on phone: Not on file     Gets together: Not on file     Attends Hoahaoism service: Not on file     Active member of club or organization: Not on file     Attends meetings of clubs or organizations: Not on file     Relationship status: Not on file    Intimate partner violence     Fear of current or ex partner: Not on file     Emotionally abused: Not on file     Physically abused: Not on file     Forced sexual activity: Not on file   Other Topics Concern    Not on file   Social History Narrative    Not on file     Social History     Tobacco Use   Smoking Status Former Smoker    Packs/day: 1.00    Years: 20.00    Pack years: 20.00    Types: Cigarettes    Start date: 1976   Chasity Holtfredy Quit date: 1997    Years since quittin.0   Smokeless Tobacco Never Used     Social History     Substance and Sexual Activity   Alcohol Use No     Social History     Substance and Sexual Activity   Drug Use No         HOME MEDICATIONS:  Prior to Admission medications    Medication Sig Start Date End Date Taking? Authorizing Provider   famotidine (PEPCID) 20 MG tablet TAKE 1 TABLET BY MOUTH TWICE A DAY 12/18/20   Shiva Franco MD   albuterol sulfate  (90 Base) MCG/ACT inhaler Inhale 2 puffs into the lungs every 6 hours as needed for Wheezing 9/4/20   Rena Lofton DO   vilazodone HCl (VILAZODONE HCL) 40 MG TABS Take 40 mg by mouth daily    Historical Provider, MD   ipratropium-albuterol (DUONEB) 0.5-2.5 (3) MG/3ML SOLN nebulizer solution Inhale 3 mLs into the lungs every 4 hours (while awake) .   Patient not taking: Reported on 1/5/2021 1/1/20   Juanjo Alberto DO       CURRENT MEDICATIONS:  Current Facility-Administered Medications: norepinephrine (LEVOPHED) 16 mg in dextrose 5 % 250 mL infusion, 2 mcg/min, Intravenous, Continuous    IV MEDICATIONS:   norepinephrine 2 mcg/min (01/05/21 2005)       ALLERGIES:  Allergies   Allergen Reactions    Rifampin Other (See Comments)     States this medicine caused Renal Failure       REVIEW OF SYSTEMS:  General ROS:  No weight loss ,no fatigue     ENT ROS:   No Sore throat ,no lymphoadenopathy,no nasal stuffiness     Hematological and Lymphatic ROS:   No ecchymosis ,no tendency to bleed  Respiratory ROS:   SOB   Cardiovascular ROS:   No CP,No Palpitation   Gastrointestinal ROS:   No Gi bleed,no nausea or vomiting      - Musculoskeletal ROS:      - no joint swelling ,no joint pain   Neurological ROS:     -no weakness or numbness    Dermatological ROS:   No skin rash ,no urticaria     PHYSICAL EXAMINATION:     VITAL SIGNS:  BP (!) 107/56   Pulse 89   Temp 98.9 °F (37.2 °C) (Oral)   Resp 18   Ht 5' 2\" (1.575 m)   Wt 104 lb (47.2 kg)   LMP  (LMP Unknown)   SpO2 98%   BMI 19.02 kg/m²   Wt Readings from Last 3 Encounters:   01/05/21 104 lb (47.2 kg)   09/04/20 104 lb (47.2 kg)   06/06/20 101 lb (45.8 kg)     Temp Readings from Last 3 Encounters:   01/05/21 98.9 °F (37.2 °C) (Oral)   09/04/20 98 °F (36.7 °C) (Temporal)   06/06/20 97.8 °F Medicine   Director of [address] [place] [address] Director of [address] [place] [address]    Juan David Hernandez    NOTE: This report was transcribed using voice recognition software. Every effort was made to ensure accuracy; however, inadvertent computerized transcription errors may be present.

## 2021-01-06 NOTE — CONSULTS
Critical Care Admit/Consult Note         Patient - Ashlyn Osullivan   MRN -  [id number]   [place] # - [de-identified]   - 1955      Date of Admission -  2021 10:34 AM  Date of evaluation -  2021  021/021-A   Hospital Day - 1            ADMIT/CONSULT DETAILS     Reason for Admit/Consult   Sepsis    Consulting Service/Physician   Consulting - Breanna Toledo DO  Primary Care Physician - Mahsa Lopez MD         HPI   The patient is a 63 y.o. female with significant past medical history of MAC, COPD on oxygen at night, and recurrent pneumonia admitted  for septicemia, pneumonia, and COPD exacerbation. She has been feeling ill for the past few days with fatigue, chills, shortness of breath, cough, L-sided pleuritic chest pain, headaches, and body aches. Patient was diagnosed with MAC in  and was found to have positive MAC cultures earlier this year. She was briefly treated but then discontinued clarithromycin, ethambutol, and rifampin due to side effects. She was offered IV amikacin but declined due to cost. She was given chest vest but does not use it.     Past Medical History         Diagnosis Date    Bronchiectasis ([address]     COPD (chronic obstructive [facility] disease) [facility]     Mycobacterium avium complex ([facility] [address] [facility]         Past Surgical History           Procedure Laterality Date    BRONCHOSCOPY N/A 2019    BRONCHOSCOPY ALVEOLAR LAVAGE performed by Blake Woodard MD at [facility] ENDOSCOPY     Current Medications   Current Medications    ipratropium-albuterol  3 mL Inhalation Q4H WA    vilazodone HCl  40 mg Oral Daily    sodium chloride flush  10 mL Intravenous 2 times per day    enoxaparin  40 mg Subcutaneous Daily    cefTRIAXone (ROCEPHIN) IV  1 g Intravenous Q24H    doxycycline (VIBRAMYCIN) IV  100 mg Intravenous Q12H    famotidine  20 mg Oral Daily     sodium chloride flush, promethazine **OR** ondansetron, polyethylene glycol, acetaminophen **OR** acetaminophen, ketorolac, albuterol  IV Drips/Infusions   sodium chloride 75 mL/hr at 01/05/21 2330    norepinephrine Stopped (01/05/21 2330)     Home Medications  Medications Prior to Admission: famotidine (PEPCID) 20 MG tablet, TAKE 1 TABLET BY MOUTH TWICE A DAY  albuterol sulfate  (90 Base) MCG/ACT inhaler, Inhale 2 puffs into the lungs every 6 hours as needed for Wheezing  vilazodone HCl (VILAZODONE HCL) 40 MG TABS, Take 40 mg by mouth daily  ipratropium-albuterol (DUONEB) 0.5-2.5 (3) MG/3ML SOLN nebulizer solution, Inhale 3 mLs into the lungs every 4 hours (while awake) . (Patient not taking: Reported on 1/5/2021)    Diet/Nutrition   DIET GENERAL;    Allergies   Rifampin    Social History   Tobacco   reports that she quit smoking about 23 years ago. Her smoking use included cigarettes. She started smoking about 44 years ago. She has a 20.00 pack-year smoking history. She has never used smokeless tobacco.    Alcohol     reports no history of alcohol use.     Occupational history :    Family History         Problem Relation Age of Onset    No Known Problems Mother     No Known Problems Father        Sleep History       ROS     REVIEW OF SYSTEMS:  CONSTITUTIONAL:  Chills, fatigue negative for  Fevers and weight loss  EYES:  negative for  double vision and blurred vision  HEENT:  negative for  nasal congestion and sore throat  RESPIRATORY:  Cough, shortness of breath  CARDIOVASCULAR:  Chest pain  GASTROINTESTINAL:  negative for nausea, vomiting and diarrhea  GENITOURINARY:  negative for frequency and dysuria  INTEGUMENT/BREAST:  negative for rash and skin lesion(s)  HEMATOLOGIC/LYMPHATIC:  negative for easy bruising and bleeding  ALLERGIC/IMMUNOLOGIC:  Recurrent pneumonia; history of MAC  ENDOCRINE:  negative for weight changes and diabetic symptoms including neither polyuria nor polydipsia  MUSCULOSKELETAL: myalgias  NEUROLOGICAL:  headaches  BEHAVIOR/PSYCH:  negative for poor appetite and anxiety    Lines and Devices    Right IJ CVC placed 21    Mechanical Ventilation Data   VENT SETTINGS (Comprehensive)  Vent Information  SpO2: 95 %  Additional Respiratory  Assessments  Pulse: 99  Resp: 26  SpO2: 95 %  Oral Care: Teeth brushed, Mouth swabbed    ABG  No results found for: PH, PCO2, PO2, HCO3, O2SAT  No results found for: IFIO2, MODE, SETTIDVOL, SETPEEP        Vitals    height is 5' 2\" (1.575 m) and weight is 104 lb (47.2 kg). Her axillary temperature is 98.6 °F (37 °C). Her blood pressure is 104/53 (abnormal) and her pulse is 99. Her respiration is 26 and oxygen saturation is 95%. Temperature Range: Temp: 98.6 °F (37 °C) Temp  Av.5 °F (36.9 °C)  Min: 97.8 °F (36.6 °C)  Max: 98.9 °F (37.2 °C)  BP Range:  Systolic (32YQV), OFJ:293 , Min:84 , ONB:427     Diastolic (65VBR), UKP:33, Min:43, Max:59    Pulse Range: Pulse  Av.5  Min: 72  Max: 120  Respiration Range: Resp  Av.5  Min: 16  Max: 26  Current Pulse Ox[de-identified]  SpO2: 95 %  24HR Pulse Ox Range:  SpO2  Av.7 %  Min: 85 %  Max: 100 %  Oxygen Amount and Delivery: O2 Flow Rate (L/min): 1 L/min      I/O (24 Hours)    Patient Vitals for the past 8 hrs:   BP Temp Temp src Pulse Resp SpO2   21 0830 -- -- -- -- -- 95 %   21 0800 (!) 104/53 98.6 °F (37 °C) Axillary 99 26 98 %   21 0734 -- -- -- -- 19 99 %   21 0700 (!) 95/52 -- -- 80 23 97 %   21 0600 (!) 90/55 -- -- 93 23 90 %   21 0500 (!) 97/48 -- -- 77 16 92 %   21 0400 (!) 97/51 98.7 °F (37.1 °C) Oral 72 16 98 %   21 0300 (!) 95/54 -- -- 76 16 98 %   21 0200 (!) 91/53 -- -- 90 18 100 %       Intake/Output Summary (Last 24 hours) at 2021 0925  Last data filed at 2021 0600  Gross per 24 hour   Intake 650.43 ml   Output 675 ml   Net -24.57 ml     I/O last 3 completed shifts: In: 650.4 [P.O.:120;  I.V.:530.4]  Out: 675 [Urine:675]   Date 21 0000 - 21   Shift 2423-3767 6323-8191 7078-6250 24 Hour Total   INTAKE   P.O.(mL/kg/hr) 120(0.3)   120 I.V.(mL/kg) 523(11.1)   523(11.1)   Shift Total(mL/kg) 775(84.4)   643(13.6)   OUTPUT   Urine(mL/kg/hr) 225(0.6)   225   Shift Total(mL/kg) 225(4.8)   225(4.8)   Weight (kg) 47.2 47.2 47.2 47.2     Patient Vitals for the past 96 hrs (Last 3 readings):   Weight   01/05/21 1032 104 lb (47.2 kg)     Exam     PHYSICAL EXAM:    General appearance - alert, appears ill, tired  Mental status - alert, oriented to person, place, and time, normal mood, behavior, speech, dress, motor activity, and thought processes  Eyes - pupils equal and reactive, extraocular eye movements intact, sclera anicteric  Ears - external ear normal  Nose - normal and patent, no erythema, discharge or polyps  Mouth - mucous membranes moist, pharynx normal without lesions  Neck - supple, no significant adenopathy  Chest - diminished bilaterally; frequent cough with inspiration  Heart - normal rate, regular rhythm, normal S1, S2, no murmurs, rubs, clicks or gallops  Abdomen - soft, nontender, nondistended, no masses or organomegaly  Neurological - alert, oriented, normal speech, no focal findings or movement disorder noted  Extremities - peripheral pulses normal, no clubbing or cyanosis. No edema.   Skin - normal coloration and turgor, no rashes, no suspicious skin lesions noted     Data   Old records and images have been reviewed    Lab Results   CBC     Lab Results   Component Value Date    WBC 13.4 01/06/2021    RBC 3.45 01/06/2021    HGB 9.9 01/06/2021    HCT 32.8 01/06/2021     01/06/2021    MCV 95.1 01/06/2021    MCH 28.7 01/06/2021    MCHC 30.2 01/06/2021    RDW 14.3 01/06/2021    LYMPHOPCT 5.9 01/05/2021    MONOPCT 6.1 01/05/2021    BASOPCT 0.3 01/05/2021    MONOSABS 0.94 01/05/2021    LYMPHSABS 0.90 01/05/2021    EOSABS 0.01 01/05/2021    BASOSABS 0.05 01/05/2021       BMP   Lab Results   Component Value Date     01/06/2021    K 4.0 01/06/2021    K 3.9 01/06/2021     01/06/2021    CO2 23 01/06/2021    BUN 12 01/06/2021 CREATININE 1.0 01/06/2021    GLUCOSE 97 01/06/2021    CALCIUM 8.5 01/06/2021       LFTS  Lab Results   Component Value Date    ALKPHOS 52 09/22/2020    ALT 10 09/22/2020    AST 19 09/22/2020    PROT 7.6 09/22/2020    BILITOT <0.2 09/22/2020    LABALBU 4.4 09/22/2020       INR  No results for input(s): PROTIME, INR in the last 72 hours. APTT  No results for input(s): APTT in the last 72 hours. Lactic Acid  Lab Results   Component Value Date    LACTA 0.6 01/06/2021    LACTA 0.7 01/05/2021    LACTA 1.7 01/05/2021        BNP   No results for input(s): BNP in the last 72 hours. Cultures     No results for input(s): BC in the last 72 hours. No results for input(s): Jerlyn Sly in the last 72 hours. No results for input(s): LABURIN in the last 72 hours. Radiology   Xr Chest Portable    Result Date: 1/5/2021  EXAMINATION: ONE XRAY VIEW OF THE CHEST 1/5/2021 7:31 pm COMPARISON: 01/05/2021 HISTORY: ORDERING SYSTEM PROVIDED HISTORY: CVC placement TECHNOLOGIST PROVIDED HISTORY: Reason for exam:->CVC placement FINDINGS: The heart and the mediastinal structures are normal.  Right IJ central line is in place with the tip at the atrial caval junction without complication. The previously noted patchy and nodular infiltrates in the lungs more on the right side are noted, with some of the cavitary lesions vaguely identified. Small right pleural effusion is present. Stable abnormal chest with persistent patchy nodular and cavitary lesions probably inflammatory process like  multifocal pneumonia. Malignancy is less likely. Right IJ central line without complications    Xr Chest Portable    Result Date: 1/5/2021  EXAMINATION: ONE XRAY VIEW OF THE CHEST 1/5/2021 10:57 am COMPARISON: June 6, 2020 HISTORY: ORDERING SYSTEM PROVIDED HISTORY: cough, CP and SOB TECHNOLOGIST PROVIDED HISTORY: Reason for exam:->cough, CP and SOB FINDINGS: Subtly increasing inflammatory changes on the right associated with bronchiectasis. The interval changes best appreciated near the right base. Normal heart and pulmonary vascularity. Increasing inflammatory change on the right with bronchiectasis. See above. Cta Chest W Contrast    Result Date: 1/5/2021  EXAMINATION: CTA OF THE CHEST 1/5/2021 1:04 pm TECHNIQUE: CTA of the chest was performed after the administration of intravenous contrast.  Multiplanar reformatted images are provided for review. MIP images are provided for review. Dose modulation, iterative reconstruction, and/or weight based adjustment of the mA/kV was utilized to reduce the radiation dose to as low as reasonably achievable. COMPARISON: None. HISTORY: ORDERING SYSTEM PROVIDED HISTORY: shortness of breath TECHNOLOGIST PROVIDED HISTORY: Reason for exam:->shortness of breath FINDINGS: Pulmonary Arteries: Pulmonary arteries are adequately opacified for evaluation. No evidence of intraluminal filling defect to suggest pulmonary embolism. Main pulmonary artery is normal in caliber. Mediastinum: No evidence of mediastinal lymphadenopathy. The heart and pericardium demonstrate no acute abnormality. There is no acute abnormality of the thoracic aorta. Lungs/pleura: Emphysematous changes are noted. There are multifocal infiltrates seen throughout the right lung more prominent within the right middle lobe and right upper lobe. There are few scattered cavitary lesions seen within the right lower lobe, the largest measures 1.6 cm. These findings are favored to represent an infectious process and not cavitary malignancy. Patchy infiltrates are seen within the left lower lobe. There is traction bronchiectasis seen within the right middle lobe. Please note in review with the patient's previous CT scan of the abdomen pelvis of 06/06/2020 small cavitary lesions were present previously. Upper Abdomen: Limited images of the upper abdomen are unremarkable. Soft Tissues/Bones: No acute bone or soft tissue abnormality.     1. There is no evidence of a pulmonary embolus. 2. Multifocal bilateral pneumonia more prominent within the right lung. 3. Dense infiltrates within the right middle lobe with traction bronchiectasis. 4. Multiple cavitary lesion seen within the right lower lobe. Please note some of the cavitary lesions were present on the patient's previous CT scan through the abdomen and pelvis dated 06/06/2020. These findings are felt to represent an infectious/inflammatory process and not cavitary malignancy. SYSTEMS ASSESSMENT  1. Pneumonia  2. H/o MAC  3. COPD    Neuro   Tylenol for headaches  Toradol for pain  Monitor mental status    Respiratory   On NCO2; wean oxygen as tolerated; keep sats >93%  Proventil q4h PRN  Albuterol inhaler q6h PRN  Duonebs q4h PRN  Pulm consult (follows with Dr. Lucia Lee)    Cardiovascular   Hypotension- off pressors; continue to monitor    Gastrointestinal   Pepcid GI ppx  Zofran/phenergan for nausea     Renal   Replace electrolytes as needed     Infectious Disease   ID consult  Doxycycline, Rocephin    Hematology/Oncology   Lovenox DVT ppx    Endocrine   Trend BGs    Social/Spiritual/DNR/Other   Full code    Transfer to monitored floor. Hany Casarez MD, PGY1  9:25 AM 1/6/2021     Rosi Sprung you for asking us to see this complex patient. \"    I personally saw, examined and provided care for the patient. Radiographs, labs and medication list were reviewed by me independently. I spoke with bedside nursing, therapists and consultants. Critical care services and times documented are independent of procedures and multidisciplinary rounds with Residents. Additionally comprehensive, multidisciplinary rounds were conducted with the MICU team. The case was discussed in detail and plans for care were established. Review of Residents documentation was conducted and revisions were made as appropriate. I agree with the above documented exam, problem list and plan of care.   Mari Bello MD   CCT excluding procedures:35'

## 2021-01-07 LAB
ANION GAP SERPL CALCULATED.3IONS-SCNC: 4 MMOL/L (ref 7–16)
BASOPHILS ABSOLUTE: 0.04 E9/L (ref 0–0.2)
BASOPHILS RELATIVE PERCENT: 0.3 % (ref 0–2)
BUN BLDV-MCNC: 11 MG/DL (ref 8–23)
CALCIUM SERPL-MCNC: 8.8 MG/DL (ref 8.6–10.2)
CHLORIDE BLD-SCNC: 113 MMOL/L (ref 98–107)
CO2: 23 MMOL/L (ref 22–29)
CREAT SERPL-MCNC: 0.8 MG/DL (ref 0.5–1)
EOSINOPHILS ABSOLUTE: 0.19 E9/L (ref 0.05–0.5)
EOSINOPHILS RELATIVE PERCENT: 1.4 % (ref 0–6)
GFR AFRICAN AMERICAN: >60
GFR NON-AFRICAN AMERICAN: >60 ML/MIN/1.73
GLUCOSE BLD-MCNC: 111 MG/DL (ref 74–99)
HCT VFR BLD CALC: 32.5 % (ref 34–48)
HEMOGLOBIN: 10.2 G/DL (ref 11.5–15.5)
IMMATURE GRANULOCYTES #: 0.07 E9/L
IMMATURE GRANULOCYTES %: 0.5 % (ref 0–5)
LYMPHOCYTES ABSOLUTE: 0.68 E9/L (ref 1.5–4)
LYMPHOCYTES RELATIVE PERCENT: 5 % (ref 20–42)
MCH RBC QN AUTO: 29.3 PG (ref 26–35)
MCHC RBC AUTO-ENTMCNC: 31.4 % (ref 32–34.5)
MCV RBC AUTO: 93.4 FL (ref 80–99.9)
MONOCYTES ABSOLUTE: 0.73 E9/L (ref 0.1–0.95)
MONOCYTES RELATIVE PERCENT: 5.4 % (ref 2–12)
MRSA CULTURE ONLY: NORMAL
NEUTROPHILS ABSOLUTE: 11.86 E9/L (ref 1.8–7.3)
NEUTROPHILS RELATIVE PERCENT: 87.4 % (ref 43–80)
PDW BLD-RTO: 14.3 FL (ref 11.5–15)
PLATELET # BLD: 278 E9/L (ref 130–450)
PMV BLD AUTO: 8.8 FL (ref 7–12)
POTASSIUM SERPL-SCNC: 3.9 MMOL/L (ref 3.5–5)
RBC # BLD: 3.48 E12/L (ref 3.5–5.5)
SARS-COV-2 ANTIBODY, TOTAL: NORMAL
SODIUM BLD-SCNC: 140 MMOL/L (ref 132–146)
WBC # BLD: 13.6 E9/L (ref 4.5–11.5)

## 2021-01-07 PROCEDURE — 2580000003 HC RX 258: Performed by: INTERNAL MEDICINE

## 2021-01-07 PROCEDURE — 36592 COLLECT BLOOD FROM PICC: CPT

## 2021-01-07 PROCEDURE — 80048 BASIC METABOLIC PNL TOTAL CA: CPT

## 2021-01-07 PROCEDURE — 2500000003 HC RX 250 WO HCPCS: Performed by: INTERNAL MEDICINE

## 2021-01-07 PROCEDURE — 85025 COMPLETE CBC W/AUTO DIFF WBC: CPT

## 2021-01-07 PROCEDURE — 86769 SARS-COV-2 COVID-19 ANTIBODY: CPT

## 2021-01-07 PROCEDURE — 6370000000 HC RX 637 (ALT 250 FOR IP): Performed by: INTERNAL MEDICINE

## 2021-01-07 PROCEDURE — 36415 COLL VENOUS BLD VENIPUNCTURE: CPT

## 2021-01-07 PROCEDURE — 2060000000 HC ICU INTERMEDIATE R&B

## 2021-01-07 PROCEDURE — 99232 SBSQ HOSP IP/OBS MODERATE 35: CPT | Performed by: FAMILY MEDICINE

## 2021-01-07 PROCEDURE — 6360000002 HC RX W HCPCS: Performed by: INTERNAL MEDICINE

## 2021-01-07 RX ADMIN — SODIUM CHLORIDE: 9 INJECTION, SOLUTION INTRAVENOUS at 20:43

## 2021-01-07 RX ADMIN — KETOROLAC TROMETHAMINE 15 MG: 30 INJECTION, SOLUTION INTRAMUSCULAR at 08:28

## 2021-01-07 RX ADMIN — DOXYCYCLINE 100 MG: 100 INJECTION, POWDER, LYOPHILIZED, FOR SOLUTION INTRAVENOUS at 01:34

## 2021-01-07 RX ADMIN — VILAZODONE HYDROCHLORIDE 40 MG: 40 TABLET ORAL at 08:28

## 2021-01-07 RX ADMIN — ENOXAPARIN SODIUM 40 MG: 40 INJECTION SUBCUTANEOUS at 08:29

## 2021-01-07 RX ADMIN — Medication 10 ML: at 09:30

## 2021-01-07 RX ADMIN — DOXYCYCLINE 100 MG: 100 INJECTION, POWDER, LYOPHILIZED, FOR SOLUTION INTRAVENOUS at 14:13

## 2021-01-07 RX ADMIN — WATER 1 G: 1 INJECTION INTRAMUSCULAR; INTRAVENOUS; SUBCUTANEOUS at 14:13

## 2021-01-07 RX ADMIN — FAMOTIDINE 20 MG: 20 TABLET, FILM COATED ORAL at 08:28

## 2021-01-07 RX ADMIN — Medication 10 ML: at 20:30

## 2021-01-07 ASSESSMENT — PAIN DESCRIPTION - PAIN TYPE
TYPE: ACUTE PAIN
TYPE: ACUTE PAIN

## 2021-01-07 ASSESSMENT — PAIN DESCRIPTION - FREQUENCY
FREQUENCY: CONTINUOUS
FREQUENCY: CONTINUOUS

## 2021-01-07 ASSESSMENT — PAIN DESCRIPTION - PROGRESSION
CLINICAL_PROGRESSION: GRADUALLY IMPROVING
CLINICAL_PROGRESSION: GRADUALLY WORSENING
CLINICAL_PROGRESSION: GRADUALLY IMPROVING

## 2021-01-07 ASSESSMENT — PAIN SCALES - GENERAL
PAINLEVEL_OUTOF10: 0

## 2021-01-07 ASSESSMENT — PAIN DESCRIPTION - ORIENTATION: ORIENTATION: MID

## 2021-01-07 ASSESSMENT — PAIN DESCRIPTION - DESCRIPTORS
DESCRIPTORS: ACHING
DESCRIPTORS: ACHING

## 2021-01-07 ASSESSMENT — PAIN DESCRIPTION - LOCATION: LOCATION: HEAD

## 2021-01-07 NOTE — PROGRESS NOTES
[facility] Progress Note    Admitting Date and Time: 1/5/2021 10:34 AM  Admit Dx: Sepsis ([facility] [A41.9]  Septic shock due to methicillin resistant Staphylococcus aureus ([address] [A41.02, R65.21]  Septic shock ([address] [A41.9, R65.21]    Subjective:  Patient is being followed for Sepsis [facility]) [A41.9]  Septic shock due to methicillin resistant Staphylococcus aureus ([facility] [address] [A41.02, R65.21]  Septic shock ([facility] [address] [A41.9, R65.21]   Pt feels weak. She has a cough. It is moist, but she does not want any mucolytic at this time. She does not like the way that guaifenisen makes her feel. Per RN: VSS, BP improved    ROS: denies fever, chills, cp, sob, n/v, HA unless stated above.       albuterol  2.5 mg Nebulization Q4H WA    sodium chloride (Inhalant)  4 mL Nebulization Q4H WA    vilazodone HCl  40 mg Oral Daily    sodium chloride flush  10 mL Intravenous 2 times per day    enoxaparin  40 mg Subcutaneous Daily    cefTRIAXone (ROCEPHIN) IV  1 g Intravenous Q24H    doxycycline (VIBRAMYCIN) IV  100 mg Intravenous Q12H    famotidine  20 mg Oral Daily         sodium chloride flush, 10 mL, PRN      promethazine, 12.5 mg, Q6H PRN    Or      ondansetron, 4 mg, Q6H PRN      polyethylene glycol, 17 g, Daily PRN      acetaminophen, 650 mg, Q6H PRN    Or      acetaminophen, 650 mg, Q6H PRN      albuterol, 2.5 mg, Q6H PRN         Objective:    BP (!) 119/59   Pulse 81   Temp 98.6 °F (37 °C) (Axillary)   Resp 20   Ht 5' 2\" (1.575 m)   Wt 104 lb (47.2 kg)   LMP  (LMP Unknown)   SpO2 99%   BMI 19.02 kg/m²     General Appearance: alert and oriented to person, place and time and in no acute distress  Skin: warm and dry  Head: normocephalic and atraumatic  Eyes: pupils equal, round, and reactive to light, extraocular eye movements intact, conjunctivae normal  Neck: neck supple and non tender without mass   Pulmonary/Chest: clear to auscultation bilaterally- no wheezes, rales or rhonchi, normal air movement, no respiratory distress  Cardiovascular: normal rate, normal S1 and S2 and no carotid bruits  Abdomen: soft, non-tender, non-distended, normal bowel sounds, no masses or organomegaly  Extremities: no cyanosis, no clubbing and no edema  Neurologic: no cranial nerve deficit and speech normal        Recent Labs     01/05/21  1046 01/06/21  0435 01/07/21  0525    137 140   K 5.4* 3.9  4.0 3.9   CL 99 109* 113*   CO2 27 23 23   BUN 14 12 11   CREATININE 1.0 1.0 0.8   GLUCOSE 116* 97 111*   CALCIUM 9.4 8.5* 8.8       Recent Labs     01/05/21  1046 01/06/21  0435 01/07/21  0525   WBC 15.4* 13.4* 13.6*   RBC 3.96 3.45* 3.48*   HGB 11.7 9.9* 10.2*   HCT 37.5 32.8* 32.5*   MCV 94.7 95.1 93.4   MCH 29.5 28.7 29.3   MCHC 31.2* 30.2* 31.4*   RDW 14.2 14.3 14.3    244 278   MPV 9.0 9.0 8.8       Radiology:   EXAMINATION:   CTA OF THE CHEST 1/5/2021 1:04 pm   TECHNIQUE:   CTA of the chest was performed after the administration of intravenous   contrast.  Multiplanar reformatted images are provided for review.  MIP   images are provided for review. Dose modulation, iterative reconstruction,   and/or weight based adjustment of the mA/kV was utilized to reduce the   radiation dose to as low as reasonably achievable. COMPARISON:   None. HISTORY:   ORDERING SYSTEM PROVIDED HISTORY: shortness of breath   TECHNOLOGIST PROVIDED HISTORY:   Reason for exam:->shortness of breath   FINDINGS:   Pulmonary Arteries: Pulmonary arteries are adequately opacified for   evaluation.  No evidence of intraluminal filling defect to suggest pulmonary   embolism.  Main pulmonary artery is normal in caliber. Mediastinum: No evidence of mediastinal lymphadenopathy.  The heart and   pericardium demonstrate no acute abnormality.  There is no acute abnormality   of the thoracic aorta.    Lungs/pleura: Emphysematous changes are noted.  There are multifocal   infiltrates seen throughout the right lung more prominent within the right   middle lobe and right upper lobe.  There are few scattered cavitary lesions   seen within the right lower lobe, the largest measures 1.6 cm.  These   findings are favored to represent an infectious process and not cavitary   malignancy.  Patchy infiltrates are seen within the left lower lobe. Balinda Calico   is traction bronchiectasis seen within the right middle lobe.  Please note in   review with the patient's previous CT scan of the abdomen pelvis of   06/06/2020 small cavitary lesions were present previously. Upper Abdomen: Limited images of the upper abdomen are unremarkable. Soft Tissues/Bones: No acute bone or soft tissue abnormality.       Impression   1. There is no evidence of a pulmonary embolus. 2. Multifocal bilateral pneumonia more prominent within the right lung. 3. Dense infiltrates within the right middle lobe with traction   bronchiectasis. 4. Multiple cavitary lesion seen within the right lower lobe.  Please note   some of the cavitary lesions were present on the patient's previous CT scan   through the abdomen and pelvis dated 06/06/2020. Critical access hospital findings are felt to   represent an infectious/inflammatory process and not cavitary malignancy.             Assessment:    Active Problems:    Sepsis ([address]    Septic shock due to methicillin resistant Staphylococcus aureus ([address]    Septic shock ([address]  Resolved Problems:    * No resolved hospital problems. *      Plan:  1. Sepsis- (leukocytosis, tachycardia, hypotension) POA- likely related to pneumonia  2. Community acquired pneumonia- on abx  3. Leukocytosis-2/2 #2  4. Hx of MAC infection-  Did not tolerate treatment previously    Continue IV abx. Awaiting final blood cultures. NOTE: This report was transcribed using voice recognition software. Every effort was made to ensure accuracy; however, inadvertent computerized transcription errors may be present.   Electronically signed by Dustin Foster MD on 1/7/2021 at 3:52 PM

## 2021-01-07 NOTE — PROGRESS NOTES
[facility] CARE AT [facility]   Progress Note    Admitting Date and Time: 1/5/2021 10:34 AM  Admit Dx: Sepsis ([address] [A41.9]  Septic shock due to methicillin resistant Staphylococcus aureus ([address] [A41.02, R65.21]  Septic shock ([address] [A41.9, R65.21]    Subjective:    Patient was admitted with Sepsis ([address] [A41.9]  Septic shock due to methicillin resistant Staphylococcus aureus ([address] [A41.02, R65.21]  Septic shock ([address] [A41.9, R65.21]. Patient denies fever, chills, cp, vomiting. Pt c/o some sob and nausea     albuterol  2.5 mg Nebulization Q4H WA    sodium chloride (Inhalant)  4 mL Nebulization Q4H WA    vilazodone HCl  40 mg Oral Daily    sodium chloride flush  10 mL Intravenous 2 times per day    enoxaparin  40 mg Subcutaneous Daily    cefTRIAXone (ROCEPHIN) IV  1 g Intravenous Q24H    doxycycline (VIBRAMYCIN) IV  100 mg Intravenous Q12H    famotidine  20 mg Oral Daily         sodium chloride flush, 10 mL, PRN      promethazine, 12.5 mg, Q6H PRN    Or      ondansetron, 4 mg, Q6H PRN      polyethylene glycol, 17 g, Daily PRN      acetaminophen, 650 mg, Q6H PRN    Or      acetaminophen, 650 mg, Q6H PRN      ketorolac, 15 mg, Q6H PRN      albuterol, 2.5 mg, Q6H PRN         Objective:          PHYSICAL EXAM:    Vitals:  BP (!) 96/50   Pulse 92   Temp 98.6 °F (37 °C) (Axillary)   Resp 20   Ht 5' 2\" (1.575 m)   Wt 104 lb (47.2 kg)   LMP  (LMP Unknown)   SpO2 97%   BMI 19.02 kg/m²     General:  Appears uncomfortable. Answers questions appropriately and cooperative with exam  HEENT:  Mucous membranes moist. No erythema, rhinorrhea, or post-nasal drip noted. Neck:  No carotid bruits. Heart:  Rhythm regular at rate of 90  Lungs:  CTA. No wheeze, rales, or rhonchi  Abdomen:  Positive bowel sounds positive. Soft. Non-tender. No guarding, rebound or rigidity. Breast/Rectal/Genitourinary: not pertinent.     Extremities:  Negative for lower extremity edema  Skin:  Warm and dry  Vascular: 2/4 Dorsalis Pedis pulses bilaterally. Neuro:  Cranial nerves 2-12 grossly intact, no focal weakness or change in sensation noted. Extraocular muscles intact. Pupils equal, round, reactive to light. Recent Labs     01/05/21  1046 01/06/21 0435    137   K 5.4* 3.9  4.0   CL 99 109*   CO2 27 23   BUN 14 12   CREATININE 1.0 1.0   GLUCOSE 116* 97   CALCIUM 9.4 8.5*       Recent Labs     01/05/21  1046 01/06/21  0435   WBC 15.4* 13.4*   RBC 3.96 3.45*   HGB 11.7 9.9*   HCT 37.5 32.8*   MCV 94.7 95.1   MCH 29.5 28.7   MCHC 31.2* 30.2*   RDW 14.2 14.3    244   MPV 9.0 9.0       CBC:   Lab Results   Component Value Date    WBC 13.4 01/06/2021    RBC 3.45 01/06/2021    HGB 9.9 01/06/2021    HCT 32.8 01/06/2021    MCV 95.1 01/06/2021    MCH 28.7 01/06/2021    MCHC 30.2 01/06/2021    RDW 14.3 01/06/2021     01/06/2021    MPV 9.0 01/06/2021     BMP:    Lab Results   Component Value Date     01/06/2021    K 4.0 01/06/2021    K 3.9 01/06/2021     01/06/2021    CO2 23 01/06/2021    BUN 12 01/06/2021    LABALBU 4.4 09/22/2020    CREATININE 1.0 01/06/2021    CALCIUM 8.5 01/06/2021    GFRAA >60 01/06/2021    LABGLOM 56 01/06/2021    GLUCOSE 97 01/06/2021     Magnesium:    Lab Results   Component Value Date    MG 2.0 01/06/2021     Phosphorus:    Lab Results   Component Value Date    PHOS 4.6 09/22/2020        Radiology:   XR CHEST PORTABLE   Final Result   Stable abnormal chest with persistent patchy nodular and cavitary lesions   probably inflammatory process like  multifocal pneumonia. Malignancy is less   likely. Right IJ central line without complications      CTA CHEST W CONTRAST   Final Result   1. There is no evidence of a pulmonary embolus. 2. Multifocal bilateral pneumonia more prominent within the right lung. 3. Dense infiltrates within the right middle lobe with traction   bronchiectasis. 4. Multiple cavitary lesion seen within the right lower lobe.   Please note some of the cavitary lesions were present on the patient's previous CT scan   through the abdomen and pelvis dated 06/06/2020. These findings are felt to   represent an infectious/inflammatory process and not cavitary malignancy. XR CHEST PORTABLE   Final Result   Increasing inflammatory change on the right with bronchiectasis. See above. Assessment:    Active Problems:    Sepsis ([address]    Septic shock due to methicillin resistant Staphylococcus aureus ([address]    Septic shock ([address]  Resolved Problems:    * No resolved hospital problems. *      Plan:  Sepsis supportive care and tx underlying infection  Acute on chronic respiratory failure with hypoxia  Adjust o2 as needed. Pulmonary following  Hypotension. Pt had been transferred to icu and to be placed on pressors. Pt's bp improved.   Pneumonia   abx per ID  Hyperglycemia likely due to stress monitor  Copd/bronchiectasis monitor  MAC pt has declined further tx    Transfer out of icu at the discretion of intensivist    Chart reviewed and updated by nursing    Time spent is 35 min    Electronically signed by Lianne Cuenca DO on 1/6/2021 at 11:06 PM

## 2021-01-07 NOTE — PATIENT CARE CONFERENCE
Intensive Care Daily Quality Rounding Checklist        ICU Team Members: Dr. David Jonas, Jalil Gann (residents), charge nurse, bedside nurse, clinical pharmacist     ICU Day #: N/A     Intubation Date: N/A     Ventilator Day #: N/A     Central Line Insertion Date: January 5                                                    Day #: NUMBER: 3      Arterial Line Insertion Date: N/A                             Day #: N/A     Temporary Hemodialysis Catheter Insertion Date: N/A                             Day # N/A     DVT Prophylaxis: Lovenox    GI Prophylaxis: Pepcid     Santiago Catheter Insertion Date:  N/A                                        Day #: N/A     Skin Issues/ Wounds and ordered treatment discussed on rounds: None     Goals/ Plans for the Day: Daily labs, wean O2 as able, await bed to transfer to floor

## 2021-01-07 NOTE — PROGRESS NOTES
[address] [place] [address] [facility]  Progress Note    SUBJECTIVE:  Chief Complaint   Patient presents with    Chest Pain     CP, SOB, chills, headache, bodyaches, all symptoms onset saturday      Although the patient is feeling better, she is complaining of a dry cough and some pain in her chest from coughing. Tolerating antibiotics. No fevers. Minimally productive sputum. Review of systems:  As stated above in the chief complaint, otherwise negative. Medications:  Scheduled Meds:   albuterol  2.5 mg Nebulization Q4H WA    sodium chloride (Inhalant)  4 mL Nebulization Q4H WA    vilazodone HCl  40 mg Oral Daily    sodium chloride flush  10 mL Intravenous 2 times per day    enoxaparin  40 mg Subcutaneous Daily    cefTRIAXone (ROCEPHIN) IV  1 g Intravenous Q24H    doxycycline (VIBRAMYCIN) IV  100 mg Intravenous Q12H    famotidine  20 mg Oral Daily     Continuous Infusions:   sodium chloride 75 mL/hr at 21 1643     PRN Meds:sodium chloride flush, promethazine **OR** ondansetron, polyethylene glycol, acetaminophen **OR** acetaminophen, ketorolac, albuterol    OBJECTIVE:  BP (!) 114/54   Pulse 86   Temp 98.6 °F (37 °C) (Axillary)   Resp 18   Ht 5' 2\" (1.575 m)   Wt 104 lb (47.2 kg)   LMP  (LMP Unknown)   SpO2 93%   BMI 19.02 kg/m²   Temp  Av.6 °F (37 °C)  Min: 98.4 °F (36.9 °C)  Max: 98.8 °F (37.1 °C)  Constitutional: The patient is awake, alert, and oriented. Sitting in bed. Slightly emaciated. No distress. Skin: Warm and dry. No rashes were noted. HEENT: Round and reactive pupils. Moist mucous membranes. No ulcerations or thrush. Neck: Supple to movements. Chest: No use of accessory muscles to breathe. Symmetrical expansion. No wheezing, crackles or rhonchi. Cardiovascular: S1 and S2 are rhythmic and regular. No murmurs appreciated. Abdomen: Positive bowel sounds to auscultation. Benign to palpation. No masses felt.  No oral  · Check final cultures  · Monitor labs    Isatu Hou  9:57 AM  1/7/2021 Detail Level: Zone

## 2021-01-07 NOTE — CARE COORDINATION
COVID negative 1/6. Lives independently alone in a 3rd floor apartment-has family support. Has nebulizer but does not use, wears home O2 2 liters at HS through [address] verified w/ T[facility] @ [address]- currently requiring O2 2lNC continuously- will need O2 testing/ order if unable to wean off at discharge. Currently on iv abxs-await ID final plan. Plan is to return home on discharge-awaiting PT/OT evals.  Will follow Vibha Langley

## 2021-01-07 NOTE — PROGRESS NOTES
Report called to MENDEZ Holden, pt to be moved into room 434. CMR called and pt placed on monitor. All belongings accounted for and placed in bed with pt. Pt called her family to let them know about transfer, she denied additional calls to be made at this time. Transport request placed, awaiting transport.     1749: Pt left for transfer

## 2021-01-08 LAB
ANION GAP SERPL CALCULATED.3IONS-SCNC: 10 MMOL/L (ref 7–16)
BASOPHILS ABSOLUTE: 0.04 E9/L (ref 0–0.2)
BASOPHILS RELATIVE PERCENT: 0.4 % (ref 0–2)
BUN BLDV-MCNC: 12 MG/DL (ref 8–23)
CALCIUM SERPL-MCNC: 8.4 MG/DL (ref 8.6–10.2)
CHLORIDE BLD-SCNC: 108 MMOL/L (ref 98–107)
CO2: 22 MMOL/L (ref 22–29)
CREAT SERPL-MCNC: 0.8 MG/DL (ref 0.5–1)
CULTURE, RESPIRATORY: NORMAL
EOSINOPHILS ABSOLUTE: 0.29 E9/L (ref 0.05–0.5)
EOSINOPHILS RELATIVE PERCENT: 3.1 % (ref 0–6)
GFR AFRICAN AMERICAN: >60
GFR NON-AFRICAN AMERICAN: >60 ML/MIN/1.73
GLUCOSE BLD-MCNC: 99 MG/DL (ref 74–99)
HCT VFR BLD CALC: 30.1 % (ref 34–48)
HEMOGLOBIN: 9.3 G/DL (ref 11.5–15.5)
IMMATURE GRANULOCYTES #: 0.03 E9/L
IMMATURE GRANULOCYTES %: 0.3 % (ref 0–5)
LYMPHOCYTES ABSOLUTE: 0.83 E9/L (ref 1.5–4)
LYMPHOCYTES RELATIVE PERCENT: 8.8 % (ref 20–42)
MCH RBC QN AUTO: 29 PG (ref 26–35)
MCHC RBC AUTO-ENTMCNC: 30.9 % (ref 32–34.5)
MCV RBC AUTO: 93.8 FL (ref 80–99.9)
MONOCYTES ABSOLUTE: 0.71 E9/L (ref 0.1–0.95)
MONOCYTES RELATIVE PERCENT: 7.5 % (ref 2–12)
NEUTROPHILS ABSOLUTE: 7.55 E9/L (ref 1.8–7.3)
NEUTROPHILS RELATIVE PERCENT: 79.9 % (ref 43–80)
PDW BLD-RTO: 14.4 FL (ref 11.5–15)
PLATELET # BLD: 288 E9/L (ref 130–450)
PMV BLD AUTO: 9.4 FL (ref 7–12)
POTASSIUM SERPL-SCNC: 3.5 MMOL/L (ref 3.5–5)
RBC # BLD: 3.21 E12/L (ref 3.5–5.5)
SMEAR, RESPIRATORY: NORMAL
SODIUM BLD-SCNC: 140 MMOL/L (ref 132–146)
WBC # BLD: 9.5 E9/L (ref 4.5–11.5)

## 2021-01-08 PROCEDURE — 6370000000 HC RX 637 (ALT 250 FOR IP): Performed by: INTERNAL MEDICINE

## 2021-01-08 PROCEDURE — 36415 COLL VENOUS BLD VENIPUNCTURE: CPT

## 2021-01-08 PROCEDURE — 85025 COMPLETE CBC W/AUTO DIFF WBC: CPT

## 2021-01-08 PROCEDURE — 6360000002 HC RX W HCPCS: Performed by: INTERNAL MEDICINE

## 2021-01-08 PROCEDURE — 2580000003 HC RX 258: Performed by: NURSE PRACTITIONER

## 2021-01-08 PROCEDURE — 2500000003 HC RX 250 WO HCPCS: Performed by: INTERNAL MEDICINE

## 2021-01-08 PROCEDURE — 2580000003 HC RX 258: Performed by: INTERNAL MEDICINE

## 2021-01-08 PROCEDURE — 6370000000 HC RX 637 (ALT 250 FOR IP): Performed by: SPECIALIST

## 2021-01-08 PROCEDURE — 99232 SBSQ HOSP IP/OBS MODERATE 35: CPT | Performed by: INTERNAL MEDICINE

## 2021-01-08 PROCEDURE — 80048 BASIC METABOLIC PNL TOTAL CA: CPT

## 2021-01-08 PROCEDURE — 97165 OT EVAL LOW COMPLEX 30 MIN: CPT

## 2021-01-08 PROCEDURE — 97530 THERAPEUTIC ACTIVITIES: CPT

## 2021-01-08 PROCEDURE — 97161 PT EVAL LOW COMPLEX 20 MIN: CPT

## 2021-01-08 PROCEDURE — 2060000000 HC ICU INTERMEDIATE R&B

## 2021-01-08 PROCEDURE — 2700000000 HC OXYGEN THERAPY PER DAY

## 2021-01-08 RX ORDER — DOXYCYCLINE HYCLATE 100 MG/1
100 CAPSULE ORAL EVERY 12 HOURS SCHEDULED
Status: DISCONTINUED | OUTPATIENT
Start: 2021-01-08 | End: 2021-01-10 | Stop reason: HOSPADM

## 2021-01-08 RX ORDER — GUAIFENESIN/DEXTROMETHORPHAN 100-10MG/5
10 SYRUP ORAL EVERY 6 HOURS PRN
Status: DISCONTINUED | OUTPATIENT
Start: 2021-01-08 | End: 2021-01-10 | Stop reason: HOSPADM

## 2021-01-08 RX ORDER — CEFUROXIME AXETIL 500 MG/1
500 TABLET ORAL EVERY 12 HOURS SCHEDULED
Status: DISCONTINUED | OUTPATIENT
Start: 2021-01-08 | End: 2021-01-10 | Stop reason: HOSPADM

## 2021-01-08 RX ADMIN — SODIUM CHLORIDE: 9 INJECTION, SOLUTION INTRAVENOUS at 20:40

## 2021-01-08 RX ADMIN — ACETAMINOPHEN 650 MG: 325 TABLET ORAL at 02:25

## 2021-01-08 RX ADMIN — CEFUROXIME AXETIL 500 MG: 500 TABLET ORAL at 20:40

## 2021-01-08 RX ADMIN — PROMETHAZINE HYDROCHLORIDE 12.5 MG: 25 TABLET ORAL at 15:28

## 2021-01-08 RX ADMIN — ACETAMINOPHEN 650 MG: 325 TABLET ORAL at 08:30

## 2021-01-08 RX ADMIN — SODIUM CHLORIDE: 9 INJECTION, SOLUTION INTRAVENOUS at 10:40

## 2021-01-08 RX ADMIN — VILAZODONE HYDROCHLORIDE 40 MG: 40 TABLET ORAL at 08:30

## 2021-01-08 RX ADMIN — GUAIFENESIN AND DEXTROMETHORPHAN 10 ML: 100; 10 SYRUP ORAL at 20:59

## 2021-01-08 RX ADMIN — ENOXAPARIN SODIUM 40 MG: 40 INJECTION SUBCUTANEOUS at 08:31

## 2021-01-08 RX ADMIN — FAMOTIDINE 20 MG: 20 TABLET, FILM COATED ORAL at 08:30

## 2021-01-08 RX ADMIN — DOXYCYCLINE 100 MG: 100 INJECTION, POWDER, LYOPHILIZED, FOR SOLUTION INTRAVENOUS at 02:38

## 2021-01-08 RX ADMIN — DOXYCYCLINE HYCLATE 100 MG: 100 CAPSULE ORAL at 20:40

## 2021-01-08 RX ADMIN — Medication 10 ML: at 21:09

## 2021-01-08 ASSESSMENT — PAIN DESCRIPTION - DESCRIPTORS: DESCRIPTORS: HEADACHE

## 2021-01-08 ASSESSMENT — PAIN SCALES - GENERAL: PAINLEVEL_OUTOF10: 3

## 2021-01-08 ASSESSMENT — PAIN DESCRIPTION - FREQUENCY: FREQUENCY: INTERMITTENT

## 2021-01-08 ASSESSMENT — PAIN - FUNCTIONAL ASSESSMENT: PAIN_FUNCTIONAL_ASSESSMENT: ACTIVITIES ARE NOT PREVENTED

## 2021-01-08 ASSESSMENT — PAIN DESCRIPTION - ONSET: ONSET: ON-GOING

## 2021-01-08 NOTE — PROGRESS NOTES
Physical Therapy    Facility/Department: [address] INTERMEDIATE 1  Initial Assessment    NAME: Dorina Wolff  : 1955  MRN: [id number]    Date of Service: 2021      Patient Diagnosis(es): The primary encounter diagnosis was Septicemia [facility]). Diagnoses of Pneumonia due to organism and COPD exacerbation ([facility] were also pertinent to this visit. has a past medical history of Bronchiectasis ([facility] COPD (chronic obstructive pulmonary disease) ([facility] and Mycobacterium avium complex ([facility] has a past surgical history that includes bronchoscopy (N/A, 2019). Referring Provider:  Sharon Valdovinos MD      Evaluating Therapist: Merlin Jc PT      Room #:434  DIAGNOSIS: Sepsis  Additional Pertinent History:COPd  PRECAUTIONS: falls, O2    Social:  Pt lives alone in a 3rd floor apartment with no elevators. Prior to admission independent without device. 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 Uses home O2 at night     Initial Evaluation  Date: 21 Treatment      Short Term/ Long Term   Goals   Was pt agreeable to Eval/treatment? yes     Does pt have pain? no     Bed Mobility  Rolling: independent  Supine to sit: independent  Sit to supine: NT  Scooting: independent  independnet   Transfers Sit to stand: CGA  Stand to sit: CGA  Stand pivot: CGA  independent   Ambulation    150 feet with no device with CGA  400 feet with no deivce independent   Stair Negotiation  Ascended and descended  NT   3 flights of steps with 1 rail independent   LE strength     4-/5    4/5   balance      Fair+     AM-PAC Raw score               20/24         Pt is alert and Oriented   LE ROM: WFL  Sensation: intact  Edema: none  Endurance: fair+     ASSESSMENT  Pt displays functional ability as noted in the objective portion of this evaluation.       Patient education  Pt educated on PT objectives    Patient response to education:   Pt verbalized understanding Pt demonstrated skill Pt requires further education in this area yes           Comments/treatmet: Sitting balance edge of bed independent. Pt ambulated in room no device CGA. Pt then ambualtd in avendano no device CGA. Pt holds arms in high guard during ambulation. Pt states she wants to be up and walking because she does not want to be weak when she gets home from hospital.     Pt's/ family goals   1. To stay independent and walk more    Patient and or family understand(s) diagnosis, prognosis, and plan of care. PLAN OF CARE:    Current Treatment Recommendations     [x] Strengthening     [] ROM   [x] Balance Training   [x] Endurance Training   [x] Transfer Training   [x] Gait Training   [x] Stair Training   [] Positioning   [x] Safety and Education Training   [x] Patient/Caregiver Education   [] HEP  [] Other     Frequency of treatments: 2-5x/week x 3-5.days    Time in  0838  Time out  0901    Total Treatment Time  11 minutes     Evaluation Time includes thorough review of current medical information, gathering information on past medical history/social history and prior level of function, completion of standardized testing/informal observation of tasks, assessment of data and education on plan of care and goals.     CPT codes:  [x] Low Complexity PT evaluation 86147  [] Moderate Complexity PT evaluation 77291  [] High Complexity PT evaluation 16699  [] PT Re-evaluation 97405  [] Gait training 23220 minutes  [] Manual therapy 84918 minutes  [x] Therapeutic activities 22785 11 minutes  [] Therapeutic exercises 03354 minutes  [] Neuromuscular reeducation 61503 minutes     [facility] PSYCHIATRY PT [id number]

## 2021-01-08 NOTE — PROGRESS NOTES
Pulse ox was 92% on room air at rest.   Ambulated patient on room air. Oxygen saturation was 90% on room air while ambulating.

## 2021-01-08 NOTE — PROGRESS NOTES
Occupational Therapy  OCCUPATIONAL THERAPY INITIAL EVALUATION      Date:2021  Patient Name: Zainab Guerrero  MRN: [id number]  : 1955  Room: [address] [place] [address]    Referring Provider: Khang Flores MD    Evaluating OT: Irineo Brandon OTR/L [id number]    AM-PAC Daily Activity Raw Score:     Recommended Adaptive Equipment: TBD    Diagnosis: Sepsis. Pt presents to ED from home with fever, chills, body aches and headache      Pertinent Medical History: COPD   Precautions:  Falls, O2     Home Living: Pt lives alone in a 3rd floor apt with 3 flights of steps on entry. Bathroom setup: tub/shower combo, standard commode     Prior Level of Function: Independent with ADLs, Independent with IADLs; completed functional mobility with no AD. O2 night only. Works 20 hr/wk at [employer]: Yes    Pain Level: headache    Cognition: A&O: 4/4. Highly motivated to get better   Problem solving:  WFL   Judgement/safety:  [facility]     Functional Assessment:   Initial Eval Status  Date: 21 Treatment session:  Short Term Goals     Feeding Independent     Grooming Set up  Independent   UB Dressing Set up  Independent   LB Dressing SBA  Management of B socks  Limited tolerance for forward trunk flexion d/t coughing  Mod I    Bathing Min A  Mod I   Toileting SBA  Mod I   Bed Mobility  Supine to sit: Independent     Functional Transfers STS: SBA  Independent   Functional Mobility SBA with no AD  Hallway distance  Independent during ADLs   Balance Sitting: good     Standing: fair no AD     Activity Tolerance Fair minus  O2 2L restin%  Post activity: 93%  Increased respirations and coughing post activity  standing diogo x6-7 min with good minus balance during self care tasks           OT issues and instructs pt on use of incentive spirometer. Pt has limited follow through and difficulty initially but after several attempts demonstrates ability to return 250mL. Increased coughing with use of spirometer.  Educated to Orthotic Management [id number]     Neuro Re-Ed [id number]     TOTAL TIMED TREATMENT 10 1       Evaluation time includes thorough review of current medical information, gathering information on past medical history/social history and prior level of function, completion of standardized testing/informal observation of tasks, assessment of data, and development of POC/Goals    Kaveh Houser OTR/L  [id number]

## 2021-01-08 NOTE — PROGRESS NOTES
[facility] Progress Note    Admitting Date and Time: 1/5/2021 10:34 AM  Admit Dx: Sepsis ([facility] [A41.9]  Septic shock due to methicillin resistant Staphylococcus aureus ([facility] [A41.02, R65.21]  Septic shock ([address] [A41.9, R65.21]    Subjective:  Patient is being followed for Sepsis [facility]) [A41.9]  Septic shock due to methicillin resistant Staphylococcus aureus ([facility] [A41.02, R65.21]  Septic shock ([facility] [A41.9, R65.21]     Patient awake, alert, sitting up in bed in no acute distress  C/o ongoing HA  Reporting having to force herself to eat  Reporting dizziness at times  Breathing feels better  On NC        ROS: denies fever, chills, cp, sob, n/v, HA unless stated above.       cefUROXime  500 mg Oral 2 times per day    doxycycline hyclate  100 mg Oral 2 times per day    albuterol  2.5 mg Nebulization Q4H WA    sodium chloride (Inhalant)  4 mL Nebulization Q4H WA    vilazodone HCl  40 mg Oral Daily    sodium chloride flush  10 mL Intravenous 2 times per day    enoxaparin  40 mg Subcutaneous Daily    famotidine  20 mg Oral Daily         sodium chloride flush, 10 mL, PRN      promethazine, 12.5 mg, Q6H PRN    Or      ondansetron, 4 mg, Q6H PRN      polyethylene glycol, 17 g, Daily PRN      acetaminophen, 650 mg, Q6H PRN    Or      acetaminophen, 650 mg, Q6H PRN      albuterol, 2.5 mg, Q6H PRN         Objective:    BP (!) 127/55   Pulse 77   Temp 98.2 °F (36.8 °C) (Oral)   Resp 18   Ht 5' 2\" (1.575 m)   Wt 105 lb 1 oz (47.7 kg)   LMP  (LMP Unknown)   SpO2 97%   BMI 19.22 kg/m²   General Appearance: alert and oriented to person, place and time and in no acute distress  Skin: warm and dry  Head: normocephalic and atraumatic  Neck: neck supple and non tender without mass   Pulmonary/Chest: clear to auscultation bilaterally-  Cardiovascular: normal rate, normal S1 and S2 and no carotid bruits  Abdomen: soft, non-tender, non-distended, normal bowel sounds, no masses or organomegaly  Extremities: no cyanosis, no clubbing and no edema  Neurologic: speech normal         Recent Labs     01/06/21 0435 01/07/21  0525 01/08/21  0314    140 140   K 3.9  4.0 3.9 3.5   * 113* 108*   CO2 23 23 22   BUN 12 11 12   CREATININE 1.0 0.8 0.8   GLUCOSE 97 111* 99   CALCIUM 8.5* 8.8 8.4*       Recent Labs     01/06/21 0435 01/07/21  0525 01/08/21  0314   WBC 13.4* 13.6* 9.5   RBC 3.45* 3.48* 3.21*   HGB 9.9* 10.2* 9.3*   HCT 32.8* 32.5* 30.1*   MCV 95.1 93.4 93.8   MCH 28.7 29.3 29.0   MCHC 30.2* 31.4* 30.9*   RDW 14.3 14.3 14.4    278 288   MPV 9.0 8.8 9.4           Assessment:    Active Problems:    Sepsis (HCC)    Septic shock due to methicillin resistant Staphylococcus aureus (HCC)    Septic shock (HCC)  Resolved Problems:    * No resolved hospital problems. *      Plan:  1. Sepsis criteria: leukocytosis 15.2 and tachycardia- HR 120s likely related to pneumonia. Continue antibiotics and monitor. Initially required ICU stay and pressors- later weaned off.      2. Acute on chronic respiratory failure with hypoxia: pt wears 2 L 02 at night. She follows with Dr. Clare Kendrick. Noted to be 85% on RA on admission. CTA chest reviewed. COVID 19 neg. Pulmonology following. . Continue antibiotics.      3. Pneumonia- likely CAP: h/o MAC:Reporting she does not tolerate MAC treatment and does not want. H/o MATTEO on HD short period of time from treatment. Appreciate pulmonology input/ ID input. Initially started on IV ceftriaxone/ IV doxycycline. - transitioned to po      4. Hyperkalemia: resolved      5. H/o COPD/ bronchiectasis      Dispo: possible dc in 24 hours     Code Status: FULL  DVT prophylaxis: lovenox      NOTE: This report was transcribed using voice recognition software. Every effort was made to ensure accuracy; however, inadvertent computerized transcription errors may be present.   Electronically signed by LISA Valladares on 1/8/2021 at 2:03 PM  HOSPITALIST [facility]idd NOTE 1/8/2021 2127PM:    Details of the evaluation - subjective assessment (including medication profile, past medical, family and social history when applicable), examination, review of lab and test data, diagnostic impressions and medical decision making - performed by LISA Dukes, were discussed with me on the date of service and I agree with clinical information herein unless otherwise noted. The patient has been evaluated by me personally earlier today. Pt reports no fevers, chills,n/v.     Exam: heart reg at rate of 78,lungs cta, abd pos bs soft nt, ext neg for le edema    I agree with the assessment and plan of LISA Dukes. Sepsis  Acute on chronic respiratory failure with hypoxia  Pneumonia   hypotension  Hyperglycemia likely due to stress  Copd/bronchiectasis  MAC        Electronically signed by Kelly Griggs D.O.   Hospitalist  [facility] at [facility]

## 2021-01-08 NOTE — PROGRESS NOTES
[address] [place] [address] [facility]  Progress Note    SUBJECTIVE:  Chief Complaint   Patient presents with    Chest Pain     CP, SOB, chills, headache, bodyaches, all symptoms onset saturday      The patient is feeling better. The cough is less productive. She has less chest discomfort when coughing. Review of systems:  As stated above in the chief complaint, otherwise negative. Medications:  Scheduled Meds:   albuterol  2.5 mg Nebulization Q4H WA    sodium chloride (Inhalant)  4 mL Nebulization Q4H WA    vilazodone HCl  40 mg Oral Daily    sodium chloride flush  10 mL Intravenous 2 times per day    enoxaparin  40 mg Subcutaneous Daily    cefTRIAXone (ROCEPHIN) IV  1 g Intravenous Q24H    doxycycline (VIBRAMYCIN) IV  100 mg Intravenous Q12H    famotidine  20 mg Oral Daily     Continuous Infusions:   sodium chloride 75 mL/hr at 21 1040     PRN Meds:sodium chloride flush, promethazine **OR** ondansetron, polyethylene glycol, acetaminophen **OR** acetaminophen, albuterol    OBJECTIVE:  BP (!) 127/55   Pulse 77   Temp 98.2 °F (36.8 °C) (Oral)   Resp 18   Ht 5' 2\" (1.575 m)   Wt 105 lb 1 oz (47.7 kg)   LMP  (LMP Unknown)   SpO2 97%   BMI 19.22 kg/m²   Temp  Av.2 °F (36.8 °C)  Min: 97.5 °F (36.4 °C)  Max: 98.5 °F (36.9 °C)  Constitutional: The patient is awake, alert, and oriented. Sitting in bed. Slightly emaciated. No distress. Skin: Warm and dry. No rashes were noted. HEENT: Round and reactive pupils. Moist mucous membranes. No ulcerations or thrush. Neck: Supple to movements. Chest: No respiratory distress. Scattered rhonchi. Cardiovascular: Heart sounds rhythmic and regular. Abdomen: Positive bowel sounds to auscultation. Benign to palpation. Extremities: No edema.   Lines: peripheral    Laboratory and Tests Review:  Lab Results   Component Value Date    WBC 9.5 2021    WBC 13.6 (H) 2021    WBC 13.4 (H) 2021    HGB 9.3 (L) 01/08/2021    HCT 30.1 (L) 01/08/2021    MCV 93.8 01/08/2021     01/08/2021     Lab Results   Component Value Date    NEUTROABS 7.55 (H) 01/08/2021    NEUTROABS 11.86 (H) 01/07/2021    NEUTROABS 13.40 (H) 01/05/2021     No results found for: [facility]  Lab Results   Component Value Date    ALT 10 09/22/2020    AST 19 09/22/2020    ALKPHOS 52 09/22/2020    BILITOT <0.2 09/22/2020     Lab Results   Component Value Date     01/08/2021    K 3.5 01/08/2021    K 4.0 01/06/2021     01/08/2021    CO2 22 01/08/2021    BUN 12 01/08/2021    CREATININE 0.8 01/08/2021    CREATININE 0.8 01/07/2021    CREATININE 1.0 01/06/2021    GFRAA >60 01/08/2021    LABGLOM >60 01/08/2021    GLUCOSE 99 01/08/2021    PROT 7.6 09/22/2020    LABALBU 4.4 09/22/2020    CALCIUM 8.4 01/08/2021    BILITOT <0.2 09/22/2020    ALKPHOS 52 09/22/2020    AST 19 09/22/2020    ALT 10 09/22/2020     Lab Results   Component Value Date    CRP 3.2 (H) 05/22/2020    CRP 0.4 04/30/2020    CRP 0.6 (H) 02/28/2020     Lab Results   Component Value Date    SEDRATE 45 (H) 05/22/2020    SEDRATE 55 (H) 04/30/2020    SEDRATE 41 (H) 02/28/2020     Radiology:      Microbiology:   Respiratory panel, including SARS-CoV-2: Negative respiratory culture 1/6/2021: OP tara reduced  Nares screen MRSA: Negative  Blood cultures 1/5/2021: Negative so far  Streptococcus pneumoniae/Legionella urine Ag: negative     ASSESSMENT:  · Probable community-acquired pneumonia, improving  · Leukocytosis associated to the above, resolving  · History of MAC infection. Did not tolerate second round of antibiotics and declined both IV and inhaled Amikacin.   No longer interested in treatment    PLAN:  · Change Ceftriaxone and IV Doxycycline to oral Cefuroxime and Doxycycline x5 days  · If doing well by tomorrow and tolerating oral antibiotics I think she can be discharged    Naya Waldrop  11:55 AM  1/8/2021

## 2021-01-09 LAB
ANION GAP SERPL CALCULATED.3IONS-SCNC: 6 MMOL/L (ref 7–16)
BASOPHILS ABSOLUTE: 0.05 E9/L (ref 0–0.2)
BASOPHILS RELATIVE PERCENT: 0.7 % (ref 0–2)
BUN BLDV-MCNC: 6 MG/DL (ref 8–23)
CALCIUM SERPL-MCNC: 8 MG/DL (ref 8.6–10.2)
CHLORIDE BLD-SCNC: 107 MMOL/L (ref 98–107)
CO2: 24 MMOL/L (ref 22–29)
CREAT SERPL-MCNC: 0.8 MG/DL (ref 0.5–1)
EOSINOPHILS ABSOLUTE: 0.29 E9/L (ref 0.05–0.5)
EOSINOPHILS RELATIVE PERCENT: 4.3 % (ref 0–6)
GFR AFRICAN AMERICAN: >60
GFR NON-AFRICAN AMERICAN: >60 ML/MIN/1.73
GLUCOSE BLD-MCNC: 107 MG/DL (ref 74–99)
HBA1C MFR BLD: 5.5 % (ref 4–5.6)
HCT VFR BLD CALC: 28.9 % (ref 34–48)
HEMOGLOBIN: 8.8 G/DL (ref 11.5–15.5)
IMMATURE GRANULOCYTES #: 0.03 E9/L
IMMATURE GRANULOCYTES %: 0.4 % (ref 0–5)
LYMPHOCYTES ABSOLUTE: 0.89 E9/L (ref 1.5–4)
LYMPHOCYTES RELATIVE PERCENT: 13.1 % (ref 20–42)
MAGNESIUM: 1.6 MG/DL (ref 1.6–2.6)
MCH RBC QN AUTO: 28.4 PG (ref 26–35)
MCHC RBC AUTO-ENTMCNC: 30.4 % (ref 32–34.5)
MCV RBC AUTO: 93.2 FL (ref 80–99.9)
MONOCYTES ABSOLUTE: 0.65 E9/L (ref 0.1–0.95)
MONOCYTES RELATIVE PERCENT: 9.6 % (ref 2–12)
NEUTROPHILS ABSOLUTE: 4.89 E9/L (ref 1.8–7.3)
NEUTROPHILS RELATIVE PERCENT: 71.9 % (ref 43–80)
PDW BLD-RTO: 14.5 FL (ref 11.5–15)
PLATELET # BLD: 305 E9/L (ref 130–450)
PMV BLD AUTO: 8.8 FL (ref 7–12)
POTASSIUM SERPL-SCNC: 3.3 MMOL/L (ref 3.5–5)
RBC # BLD: 3.1 E12/L (ref 3.5–5.5)
SODIUM BLD-SCNC: 137 MMOL/L (ref 132–146)
WBC # BLD: 6.8 E9/L (ref 4.5–11.5)

## 2021-01-09 PROCEDURE — 6360000002 HC RX W HCPCS: Performed by: NURSE PRACTITIONER

## 2021-01-09 PROCEDURE — 99232 SBSQ HOSP IP/OBS MODERATE 35: CPT | Performed by: FAMILY MEDICINE

## 2021-01-09 PROCEDURE — 6370000000 HC RX 637 (ALT 250 FOR IP): Performed by: FAMILY MEDICINE

## 2021-01-09 PROCEDURE — 83036 HEMOGLOBIN GLYCOSYLATED A1C: CPT

## 2021-01-09 PROCEDURE — 2700000000 HC OXYGEN THERAPY PER DAY

## 2021-01-09 PROCEDURE — 6370000000 HC RX 637 (ALT 250 FOR IP): Performed by: INTERNAL MEDICINE

## 2021-01-09 PROCEDURE — 85025 COMPLETE CBC W/AUTO DIFF WBC: CPT

## 2021-01-09 PROCEDURE — 83735 ASSAY OF MAGNESIUM: CPT

## 2021-01-09 PROCEDURE — 36415 COLL VENOUS BLD VENIPUNCTURE: CPT

## 2021-01-09 PROCEDURE — 6370000000 HC RX 637 (ALT 250 FOR IP): Performed by: SPECIALIST

## 2021-01-09 PROCEDURE — 80048 BASIC METABOLIC PNL TOTAL CA: CPT

## 2021-01-09 PROCEDURE — 99223 1ST HOSP IP/OBS HIGH 75: CPT | Performed by: INTERNAL MEDICINE

## 2021-01-09 PROCEDURE — 6360000002 HC RX W HCPCS: Performed by: INTERNAL MEDICINE

## 2021-01-09 PROCEDURE — 2060000000 HC ICU INTERMEDIATE R&B

## 2021-01-09 PROCEDURE — 2580000003 HC RX 258: Performed by: NURSE PRACTITIONER

## 2021-01-09 RX ORDER — POTASSIUM CHLORIDE 20 MEQ/1
20 TABLET, EXTENDED RELEASE ORAL 2 TIMES DAILY WITH MEALS
Status: DISCONTINUED | OUTPATIENT
Start: 2021-01-09 | End: 2021-01-10 | Stop reason: HOSPADM

## 2021-01-09 RX ORDER — SODIUM CHLORIDE FOR INHALATION 3 %
4 VIAL, NEBULIZER (ML) INHALATION EVERY 12 HOURS
Status: DISCONTINUED | OUTPATIENT
Start: 2021-01-09 | End: 2021-01-10 | Stop reason: HOSPADM

## 2021-01-09 RX ORDER — MAGNESIUM SULFATE IN WATER 40 MG/ML
2 INJECTION, SOLUTION INTRAVENOUS ONCE
Status: COMPLETED | OUTPATIENT
Start: 2021-01-09 | End: 2021-01-09

## 2021-01-09 RX ADMIN — MAGNESIUM SULFATE 2 G: 2 INJECTION INTRAVENOUS at 14:28

## 2021-01-09 RX ADMIN — DOXYCYCLINE HYCLATE 100 MG: 100 CAPSULE ORAL at 20:15

## 2021-01-09 RX ADMIN — DOXYCYCLINE HYCLATE 100 MG: 100 CAPSULE ORAL at 09:15

## 2021-01-09 RX ADMIN — ACETAMINOPHEN 650 MG: 325 TABLET ORAL at 15:52

## 2021-01-09 RX ADMIN — CEFUROXIME AXETIL 500 MG: 500 TABLET ORAL at 09:15

## 2021-01-09 RX ADMIN — POTASSIUM CHLORIDE 20 MEQ: 20 TABLET, EXTENDED RELEASE ORAL at 17:58

## 2021-01-09 RX ADMIN — VILAZODONE HYDROCHLORIDE 40 MG: 40 TABLET ORAL at 09:15

## 2021-01-09 RX ADMIN — GUAIFENESIN AND DEXTROMETHORPHAN 10 ML: 100; 10 SYRUP ORAL at 15:51

## 2021-01-09 RX ADMIN — GUAIFENESIN AND DEXTROMETHORPHAN 10 ML: 100; 10 SYRUP ORAL at 09:26

## 2021-01-09 RX ADMIN — CEFUROXIME AXETIL 500 MG: 500 TABLET ORAL at 20:15

## 2021-01-09 RX ADMIN — ENOXAPARIN SODIUM 40 MG: 40 INJECTION SUBCUTANEOUS at 09:15

## 2021-01-09 RX ADMIN — FAMOTIDINE 20 MG: 20 TABLET, FILM COATED ORAL at 09:15

## 2021-01-09 RX ADMIN — POTASSIUM CHLORIDE 20 MEQ: 20 TABLET, EXTENDED RELEASE ORAL at 14:30

## 2021-01-09 RX ADMIN — SODIUM CHLORIDE: 9 INJECTION, SOLUTION INTRAVENOUS at 17:58

## 2021-01-09 ASSESSMENT — PAIN SCALES - GENERAL: PAINLEVEL_OUTOF10: 0

## 2021-01-09 ASSESSMENT — PAIN DESCRIPTION - LOCATION: LOCATION: CHEST

## 2021-01-09 NOTE — PROGRESS NOTES
[facility] Progress Note    Admitting Date and Time: 1/5/2021 10:34 AM  Admit Dx: Sepsis ([facility] [address] [A41.9]  Septic shock due to methicillin resistant Staphylococcus aureus ([address] [A41.02, R65.21]  Septic shock ([address] [A41.9, R65.21]    Subjective:  Patient is being followed for Sepsis [facility]) [A41.9]  Septic shock due to methicillin resistant Staphylococcus aureus ([facility] [address] [A41.02, R65.21]  Septic shock ([facility] [address] [A41.9, R65.21]     Pt resting in bed in no acute distress  Reporting she still does not feel good  C/o chest discomfort with coughing  On NC  Appetite fair          ROS: denies fever, chills, cp, sob, n/v, HA unless stated above.       potassium chloride  20 mEq Oral BID WC    cefUROXime  500 mg Oral 2 times per day    doxycycline hyclate  100 mg Oral 2 times per day    albuterol  2.5 mg Nebulization Q4H WA    sodium chloride (Inhalant)  4 mL Nebulization Q4H WA    vilazodone HCl  40 mg Oral Daily    sodium chloride flush  10 mL Intravenous 2 times per day    enoxaparin  40 mg Subcutaneous Daily    famotidine  20 mg Oral Daily         guaiFENesin-dextromethorphan, 10 mL, Q6H PRN      benzocaine-menthol, 1 lozenge, Q2H PRN      sodium chloride flush, 10 mL, PRN      promethazine, 12.5 mg, Q6H PRN    Or      ondansetron, 4 mg, Q6H PRN      polyethylene glycol, 17 g, Daily PRN      acetaminophen, 650 mg, Q6H PRN    Or      acetaminophen, 650 mg, Q6H PRN      albuterol, 2.5 mg, Q6H PRN         Objective:    BP (!) 112/55   Pulse 85   Temp 98 °F (36.7 °C) (Oral)   Resp 20   Ht 5' 2\" (1.575 m)   Wt 104 lb 4.8 oz (47.3 kg)   LMP  (LMP Unknown)   SpO2 98%   BMI 19.08 kg/m²     General Appearance: alert and oriented to person, place and time and in no acute distress  Skin: warm and dry  Head: normocephalic and atraumatic  Neck: neck supple and non tender without mass   Pulmonary/Chest: diminished  Cardiovascular: normal rate, normal S1 and S2 and no carotid bruits  Abdomen: soft, recognition software. Every effort was made to ensure accuracy; however, inadvertent computerized transcription errors may be present.   Electronically signed by LISA Grigsby on 1/9/2021 at 11:30 AM

## 2021-01-09 NOTE — PROGRESS NOTES
[address] [place] [address] [facility]  Progress Note    SUBJECTIVE:  Chief Complaint   Patient presents with    Chest Pain     CP, SOB, chills, headache, bodyaches, all symptoms onset saturday      The patient is feeling better. No fevers overnight. Continues with productive cough, yellow sputum. Continues with sob on O2 via nc. Review of systems:  As stated above in the chief complaint, otherwise negative. Medications:  Scheduled Meds:   potassium chloride  20 mEq Oral BID WC    magnesium sulfate  2 g Intravenous Once    cefUROXime  500 mg Oral 2 times per day    doxycycline hyclate  100 mg Oral 2 times per day    albuterol  2.5 mg Nebulization Q4H WA    sodium chloride (Inhalant)  4 mL Nebulization Q4H WA    vilazodone HCl  40 mg Oral Daily    sodium chloride flush  10 mL Intravenous 2 times per day    enoxaparin  40 mg Subcutaneous Daily    famotidine  20 mg Oral Daily     Continuous Infusions:   sodium chloride 100 mL/hr at 210     PRN Meds:guaiFENesin-dextromethorphan, benzocaine-menthol, sodium chloride flush, promethazine **OR** ondansetron, polyethylene glycol, acetaminophen **OR** acetaminophen, albuterol    OBJECTIVE:  BP (!) 107/55   Pulse 72   Temp 98.3 °F (36.8 °C) (Oral)   Resp 18   Ht 5' 2\" (1.575 m)   Wt 104 lb 4.8 oz (47.3 kg)   LMP  (LMP Unknown)   SpO2 98%   BMI 19.08 kg/m²   Temp  Av.3 °F (36.8 °C)  Min: 98 °F (36.7 °C)  Max: 98.6 °F (37 °C)  Constitutional: The patient is awake, alert, and oriented. Sitting in bed. Slightly emaciated. No distress. Skin: Warm and dry. No rashes were noted. HEENT: Round and reactive pupils. Moist mucous membranes. No ulcerations or thrush. Neck: Supple to movements. Chest: No respiratory distress. Coarse rales b/l   Cardiovascular: Heart sounds rhythmic and regular. Abdomen: Positive bowel sounds to auscultation. Benign to palpation. Extremities: No edema.   Lines: peripheral    Laboratory practice nurse. Labs, cultures, and radiographs reviewed. Face to Face encounter occurred. Changes made as necessary by me.      Teddy Finney MD  Infectious Disease      2:38 PM  1/9/2021

## 2021-01-09 NOTE — PROGRESS NOTES
Patient on room air at rest pulse ox 92%. Patient ambulating on room air pulse ox 84%. 2 liters oxygen applied, pulse ox 94%.

## 2021-01-10 VITALS
BODY MASS INDEX: 22.49 KG/M2 | HEART RATE: 72 BPM | HEIGHT: 62 IN | RESPIRATION RATE: 18 BRPM | DIASTOLIC BLOOD PRESSURE: 60 MMHG | TEMPERATURE: 97.8 F | OXYGEN SATURATION: 97 % | WEIGHT: 122.2 LBS | SYSTOLIC BLOOD PRESSURE: 128 MMHG

## 2021-01-10 LAB
ANION GAP SERPL CALCULATED.3IONS-SCNC: 5 MMOL/L (ref 7–16)
BASOPHILS ABSOLUTE: 0.05 E9/L (ref 0–0.2)
BASOPHILS RELATIVE PERCENT: 0.9 % (ref 0–2)
BLOOD CULTURE, ROUTINE: NORMAL
BUN BLDV-MCNC: 8 MG/DL (ref 8–23)
CALCIUM SERPL-MCNC: 8.3 MG/DL (ref 8.6–10.2)
CHLORIDE BLD-SCNC: 109 MMOL/L (ref 98–107)
CO2: 27 MMOL/L (ref 22–29)
CREAT SERPL-MCNC: 0.8 MG/DL (ref 0.5–1)
CULTURE, BLOOD 2: NORMAL
EOSINOPHILS ABSOLUTE: 0.37 E9/L (ref 0.05–0.5)
EOSINOPHILS RELATIVE PERCENT: 6.7 % (ref 0–6)
GFR AFRICAN AMERICAN: >60
GFR NON-AFRICAN AMERICAN: >60 ML/MIN/1.73
GLUCOSE BLD-MCNC: 108 MG/DL (ref 74–99)
HCT VFR BLD CALC: 29.9 % (ref 34–48)
HEMOGLOBIN: 9.4 G/DL (ref 11.5–15.5)
IMMATURE GRANULOCYTES #: 0.04 E9/L
IMMATURE GRANULOCYTES %: 0.7 % (ref 0–5)
LYMPHOCYTES ABSOLUTE: 1.09 E9/L (ref 1.5–4)
LYMPHOCYTES RELATIVE PERCENT: 19.7 % (ref 20–42)
MAGNESIUM: 2 MG/DL (ref 1.6–2.6)
MCH RBC QN AUTO: 29.1 PG (ref 26–35)
MCHC RBC AUTO-ENTMCNC: 31.4 % (ref 32–34.5)
MCV RBC AUTO: 92.6 FL (ref 80–99.9)
MONOCYTES ABSOLUTE: 0.58 E9/L (ref 0.1–0.95)
MONOCYTES RELATIVE PERCENT: 10.5 % (ref 2–12)
NEUTROPHILS ABSOLUTE: 3.41 E9/L (ref 1.8–7.3)
NEUTROPHILS RELATIVE PERCENT: 61.5 % (ref 43–80)
PDW BLD-RTO: 14.6 FL (ref 11.5–15)
PLATELET # BLD: 324 E9/L (ref 130–450)
PMV BLD AUTO: 8.8 FL (ref 7–12)
POTASSIUM SERPL-SCNC: 3.9 MMOL/L (ref 3.5–5)
RBC # BLD: 3.23 E12/L (ref 3.5–5.5)
SODIUM BLD-SCNC: 141 MMOL/L (ref 132–146)
WBC # BLD: 5.5 E9/L (ref 4.5–11.5)

## 2021-01-10 PROCEDURE — 6370000000 HC RX 637 (ALT 250 FOR IP): Performed by: INTERNAL MEDICINE

## 2021-01-10 PROCEDURE — 83735 ASSAY OF MAGNESIUM: CPT

## 2021-01-10 PROCEDURE — 6370000000 HC RX 637 (ALT 250 FOR IP): Performed by: SPECIALIST

## 2021-01-10 PROCEDURE — 80048 BASIC METABOLIC PNL TOTAL CA: CPT

## 2021-01-10 PROCEDURE — 36415 COLL VENOUS BLD VENIPUNCTURE: CPT

## 2021-01-10 PROCEDURE — 99239 HOSP IP/OBS DSCHRG MGMT >30: CPT | Performed by: FAMILY MEDICINE

## 2021-01-10 PROCEDURE — 2700000000 HC OXYGEN THERAPY PER DAY

## 2021-01-10 PROCEDURE — 6360000002 HC RX W HCPCS: Performed by: INTERNAL MEDICINE

## 2021-01-10 PROCEDURE — 6370000000 HC RX 637 (ALT 250 FOR IP): Performed by: FAMILY MEDICINE

## 2021-01-10 PROCEDURE — 85025 COMPLETE CBC W/AUTO DIFF WBC: CPT

## 2021-01-10 RX ORDER — DOXYCYCLINE HYCLATE 100 MG/1
100 CAPSULE ORAL EVERY 12 HOURS SCHEDULED
Qty: 6 CAPSULE | Refills: 0 | Status: SHIPPED | OUTPATIENT
Start: 2021-01-10 | End: 2021-01-13

## 2021-01-10 RX ORDER — CEFUROXIME AXETIL 500 MG/1
500 TABLET ORAL EVERY 12 HOURS SCHEDULED
Qty: 6 TABLET | Refills: 0 | Status: SHIPPED | OUTPATIENT
Start: 2021-01-10 | End: 2021-01-13

## 2021-01-10 RX ADMIN — POTASSIUM CHLORIDE 20 MEQ: 20 TABLET, EXTENDED RELEASE ORAL at 16:01

## 2021-01-10 RX ADMIN — DOXYCYCLINE HYCLATE 100 MG: 100 CAPSULE ORAL at 09:33

## 2021-01-10 RX ADMIN — VILAZODONE HYDROCHLORIDE 40 MG: 40 TABLET ORAL at 09:33

## 2021-01-10 RX ADMIN — CEFUROXIME AXETIL 500 MG: 500 TABLET ORAL at 09:34

## 2021-01-10 RX ADMIN — ENOXAPARIN SODIUM 40 MG: 40 INJECTION SUBCUTANEOUS at 09:34

## 2021-01-10 RX ADMIN — ACETAMINOPHEN 650 MG: 325 TABLET ORAL at 02:43

## 2021-01-10 RX ADMIN — ACETAMINOPHEN 650 MG: 325 TABLET ORAL at 12:23

## 2021-01-10 RX ADMIN — FAMOTIDINE 20 MG: 20 TABLET, FILM COATED ORAL at 09:33

## 2021-01-10 RX ADMIN — POTASSIUM CHLORIDE 20 MEQ: 20 TABLET, EXTENDED RELEASE ORAL at 09:33

## 2021-01-10 RX ADMIN — GUAIFENESIN AND DEXTROMETHORPHAN 10 ML: 100; 10 SYRUP ORAL at 02:43

## 2021-01-10 ASSESSMENT — PAIN SCALES - GENERAL: PAINLEVEL_OUTOF10: 9

## 2021-01-10 NOTE — PROGRESS NOTES
[address] [place] [address] [facility]  Progress Note    SUBJECTIVE:  Chief Complaint   Patient presents with    Chest Pain     CP, SOB, chills, headache, bodyaches, all symptoms onset saturday      The patient is feeling better. No fevers overnight. Continues with productive cough, yellow sputum. Continues with sob on O2 via nc. Review of systems:  As stated above in the chief complaint, otherwise negative. Medications:  Scheduled Meds:   potassium chloride  20 mEq Oral BID WC    sodium chloride (Inhalant)  4 mL Nebulization Q12H    cefUROXime  500 mg Oral 2 times per day    doxycycline hyclate  100 mg Oral 2 times per day    albuterol  2.5 mg Nebulization Q4H WA    sodium chloride (Inhalant)  4 mL Nebulization Q4H WA    vilazodone HCl  40 mg Oral Daily    sodium chloride flush  10 mL Intravenous 2 times per day    enoxaparin  40 mg Subcutaneous Daily    famotidine  20 mg Oral Daily     Continuous Infusions:   sodium chloride 100 mL/hr at 21 1758     PRN Meds:guaiFENesin-dextromethorphan, benzocaine-menthol, sodium chloride flush, promethazine **OR** ondansetron, polyethylene glycol, acetaminophen **OR** acetaminophen, albuterol    OBJECTIVE:  BP (!) 114/58   Pulse 74   Temp 98 °F (36.7 °C) (Oral)   Resp 16   Ht 5' 2\" (1.575 m)   Wt 122 lb 3.2 oz (55.4 kg)   LMP  (LMP Unknown)   SpO2 97%   BMI 22.35 kg/m²   Temp  Av.1 °F (36.7 °C)  Min: 98 °F (36.7 °C)  Max: 98.3 °F (36.8 °C)  Constitutional: The patient is awake, alert, and oriented. Sitting in bed. Slightly emaciated. No distress. Skin: Warm and dry. No rashes were noted. HEENT: Round and reactive pupils. Moist mucous membranes. No ulcerations or thrush. Neck: Supple to movements. Chest: No respiratory distress. Coarse rales b/l   Cardiovascular: Heart sounds rhythmic and regular. Abdomen: Positive bowel sounds to auscultation. Benign to palpation. Extremities: No edema.   Lines: peripheral    Laboratory and Tests Review:  Lab Results   Component Value Date    WBC 5.5 01/10/2021    WBC 6.8 01/09/2021    WBC 9.5 01/08/2021    HGB 9.4 (L) 01/10/2021    HCT 29.9 (L) 01/10/2021    MCV 92.6 01/10/2021     01/10/2021     Lab Results   Component Value Date    NEUTROABS 3.41 01/10/2021    NEUTROABS 4.89 01/09/2021    NEUTROABS 7.55 (H) 01/08/2021     No results found for: [facility]  Lab Results   Component Value Date    ALT 10 09/22/2020    AST 19 09/22/2020    ALKPHOS 52 09/22/2020    BILITOT <0.2 09/22/2020     Lab Results   Component Value Date     01/10/2021    K 3.9 01/10/2021    K 4.0 01/06/2021     01/10/2021    CO2 27 01/10/2021    BUN 8 01/10/2021    CREATININE 0.8 01/10/2021    CREATININE 0.8 01/09/2021    CREATININE 0.8 01/08/2021    GFRAA >60 01/10/2021    LABGLOM >60 01/10/2021    GLUCOSE 108 01/10/2021    PROT 7.6 09/22/2020    LABALBU 4.4 09/22/2020    CALCIUM 8.3 01/10/2021    BILITOT <0.2 09/22/2020    ALKPHOS 52 09/22/2020    AST 19 09/22/2020    ALT 10 09/22/2020     Lab Results   Component Value Date    CRP 3.2 (H) 05/22/2020    CRP 0.4 04/30/2020    CRP 0.6 (H) 02/28/2020     Lab Results   Component Value Date    SEDRATE 45 (H) 05/22/2020    SEDRATE 55 (H) 04/30/2020    SEDRATE 41 (H) 02/28/2020     Radiology:      Microbiology:   Respiratory panel, including SARS-CoV-2: Negative respiratory culture 1/6/2021: OP tara reduced  Nares screen MRSA: Negative  Blood cultures 1/5/2021: Negative so far  Streptococcus pneumoniae/Legionella urine Ag: negative     ASSESSMENT:  · Probable community-acquired pneumonia, improving  · Leukocytosis associated to the above, resolving  · History of MAC infection. Did not tolerate second round of antibiotics and declined both IV and inhaled Amikacin.   No longer interested in treatment    PLAN:  · Continue Cefuroxime and Doxycycline x5 days  · Respiratory cultures from 1/6/2021 shows abundant PMNs, abundant gram-positive diplococci and few gram-negative rods      Erik Jacome        10:24 AM  1/10/2021

## 2021-01-10 NOTE — PROGRESS NOTES
Spoke with Dr. Rosario Odom regarding patient's request to return to work a week from discharge. Return to work letter given to patient.

## 2021-01-10 NOTE — DISCHARGE SUMMARY
for;    1. Sepsis criteria: leukocytosis 15.2 and tachycardia- HR 120s likely related to pneumonia. Continue antibiotics and monitor. Initially required ICU stay and pressors- later weaned off. Resolved.     2. Acute on chronic respiratory failure with hypoxia: pt wears 2 L 02 at night. She follows with Dr. Ana Beth to be 85% on RA on admission. CTA chest reviewed. COVID 19 neg. Pulmonology following. Continue antibiotics. Nursing completed ambulatory pulse ox test, pt dropped to 84% and pt recovered on 2 L NC. New DME script for continuous home 02 as she was only wearing at night. Discussed with pt. She will need outpt follow up with pulmonology. 3. Pneumonia- likely CAP: h/o MAC:Reporting she does not tolerate MAC treatment and does not want. H/o MATTEO on HD short period of time from treatment. Appreciate pulmonology input/ ID input. Initially started on IV ceftriaxone/ IV doxycycline. - transitioned to po      4. Hypokalemia/ hypomagnesemia: supplemented. Appetite better     5. H/o COPD/ bronchiectasis     6. Hyperglycemia likely due to stress: monitor  hg a1c 5.5.         Patient continued to improve. She was then discharged in stable condition with the following medications, instructions, and follow up. [facility] was offered and she declined.        Discharge Exam:    General Appearance: alert and oriented to person, place and time and in no acute distress  Skin: warm and dry  Head: normocephalic and atraumatic  Neck: neck supple and non tender without mass   Pulmonary/Chest: diminished throughout to auscultation   Cardiovascular: normal rate, normal S1 and S2 and no carotid bruits  Abdomen: soft, non-tender, non-distended, normal bowel sounds, no masses or organomegaly  Extremities: no cyanosis, no clubbing and no edema  Neurologic: speech normal     I/O last 3 completed shifts: In: 0691 [P.O.:660; I.V.:988]  Out: 1250 [Urine:1250]  I/O this shift:   In: 18 [P.O.:480]  Out: -       LABS:  Recent Labs 01/08/21  0314 01/09/21  0324 01/10/21  0315    137 141   K 3.5 3.3* 3.9   * 107 109*   CO2 22 24 27   BUN 12 6* 8   CREATININE 0.8 0.8 0.8   GLUCOSE 99 107* 108*   CALCIUM 8.4* 8.0* 8.3*       Recent Labs     01/08/21  0314 01/09/21  0324 01/10/21  0315   WBC 9.5 6.8 5.5   RBC 3.21* 3.10* 3.23*   HGB 9.3* 8.8* 9.4*   HCT 30.1* 28.9* 29.9*   MCV 93.8 93.2 92.6   MCH 29.0 28.4 29.1   MCHC 30.9* 30.4* 31.4*   RDW 14.4 14.5 14.6    305 324   MPV 9.4 8.8 8.8       No results for input(s): POCGLU in the last 72 hours. Imaging:  Xr Chest Portable    Result Date: 1/5/2021  EXAMINATION: ONE XRAY VIEW OF THE CHEST 1/5/2021 7:31 pm COMPARISON: 01/05/2021 HISTORY: ORDERING SYSTEM PROVIDED HISTORY: CVC placement TECHNOLOGIST PROVIDED HISTORY: Reason for exam:->CVC placement FINDINGS: The heart and the mediastinal structures are normal.  Right IJ central line is in place with the tip at the atrial caval junction without complication. The previously noted patchy and nodular infiltrates in the lungs more on the right side are noted, with some of the cavitary lesions vaguely identified. Small right pleural effusion is present. Stable abnormal chest with persistent patchy nodular and cavitary lesions probably inflammatory process like  multifocal pneumonia. Malignancy is less likely. Right IJ central line without complications    Xr Chest Portable    Result Date: 1/5/2021  EXAMINATION: ONE XRAY VIEW OF THE CHEST 1/5/2021 10:57 am COMPARISON: June 6, 2020 HISTORY: ORDERING SYSTEM PROVIDED HISTORY: cough, CP and SOB TECHNOLOGIST PROVIDED HISTORY: Reason for exam:->cough, CP and SOB FINDINGS: Subtly increasing inflammatory changes on the right associated with bronchiectasis. The interval changes best appreciated near the right base. Normal heart and pulmonary vascularity. Increasing inflammatory change on the right with bronchiectasis. See above.     Cta Chest W Contrast    Result Date: 1/5/2021  EXAMINATION: CTA OF THE CHEST 1/5/2021 1:04 pm TECHNIQUE: CTA of the chest was performed after the administration of intravenous contrast.  Multiplanar reformatted images are provided for review. MIP images are provided for review. Dose modulation, iterative reconstruction, and/or weight based adjustment of the mA/kV was utilized to reduce the radiation dose to as low as reasonably achievable. COMPARISON: None. HISTORY: ORDERING SYSTEM PROVIDED HISTORY: shortness of breath TECHNOLOGIST PROVIDED HISTORY: Reason for exam:->shortness of breath FINDINGS: Pulmonary Arteries: Pulmonary arteries are adequately opacified for evaluation. No evidence of intraluminal filling defect to suggest pulmonary embolism. Main pulmonary artery is normal in caliber. Mediastinum: No evidence of mediastinal lymphadenopathy. The heart and pericardium demonstrate no acute abnormality. There is no acute abnormality of the thoracic aorta. Lungs/pleura: Emphysematous changes are noted. There are multifocal infiltrates seen throughout the right lung more prominent within the right middle lobe and right upper lobe. There are few scattered cavitary lesions seen within the right lower lobe, the largest measures 1.6 cm. These findings are favored to represent an infectious process and not cavitary malignancy. Patchy infiltrates are seen within the left lower lobe. There is traction bronchiectasis seen within the right middle lobe. Please note in review with the patient's previous CT scan of the abdomen pelvis of 06/06/2020 small cavitary lesions were present previously. Upper Abdomen: Limited images of the upper abdomen are unremarkable. Soft Tissues/Bones: No acute bone or soft tissue abnormality. 1. There is no evidence of a pulmonary embolus. 2. Multifocal bilateral pneumonia more prominent within the right lung. 3. Dense infiltrates within the right middle lobe with traction bronchiectasis.  4. Multiple cavitary lesion

## 2021-01-13 ENCOUNTER — VIRTUAL VISIT (OUTPATIENT)
Dept: FAMILY MEDICINE CLINIC | Age: 66
End: 2021-01-13
Payer: MEDICARE

## 2021-01-13 DIAGNOSIS — J18.9 PNEUMONIA DUE TO ORGANISM: Primary | ICD-10-CM

## 2021-01-13 PROCEDURE — 99442 PR PHYS/QHP TELEPHONE EVALUATION 11-20 MIN: CPT | Performed by: FAMILY MEDICINE

## 2021-01-13 ASSESSMENT — PATIENT HEALTH QUESTIONNAIRE - PHQ9
SUM OF ALL RESPONSES TO PHQ QUESTIONS 1-9: 0
SUM OF ALL RESPONSES TO PHQ9 QUESTIONS 1 & 2: 0
2. FEELING DOWN, DEPRESSED OR HOPELESS: 0

## 2021-01-13 NOTE — PROGRESS NOTES
Mello Mccloud is a 63 y.o. female evaluated via telephone on 1/13/2021. Consent:  She and/or health care decision maker is aware that that she may receive a bill for this telephone service, depending on her insurance coverage, and has provided verbal consent to proceed: Yes    Chief Complaint   Patient presents with   [address] from [address] [place] [address] for bacterial pneumonia. patient states she is feeling better each day. C/O cough, sob. patient was discharged with oxygen.  Other     patient consents to telemedicine visit and is at home in [address] [place] [address]:  I communicated with the patient and/or health care decision maker about her recent admission 1/5-1/10/21 for septic shock secondary to  pneumonia. Details of this discussion including any medical advice provided: She has a significant history of MAC and COPD. Pulmonology and ID were on board IP and she has follow ups with both in the next 2 weeks. She reports feeling a lot better, but still has some fatigue and mild sob with a cough. Supportive treatment discussed. Complete antibiotic regimen as prescribed. Covid test was negative 1/7. Keep f/u with specialist. At this time rtw anticipated 1/18/21, unless symptoms worsen or new symptoms develop. Patient recommended to schedule an established visit with labs. I affirm this is a Patient Initiated Episode with a Patient who has not had a related appointment within my department in the past 7 days or scheduled within the next 24 hours.     Patient identification was verified at the start of the visit: Yes    Total Time: minutes: 11-20 minutes    Note: not billable if this call serves to triage the patient into an appointment for the relevant concern      Caar Nicolas

## 2021-02-01 ENCOUNTER — TELEPHONE (OUTPATIENT)
Dept: FAMILY MEDICINE CLINIC | Age: 66
End: 2021-02-01

## 2021-02-01 NOTE — TELEPHONE ENCOUNTER
Pt left message for [address] [place] [address] requesting return call for information about a note to \"get back to work\". Pt was seen on 01/14/2021 and was released to work with no restrictions as of 01/18/2021. This MA attempted to return call to pt. Pt's phone rang directly to voicemail. This MA left message for pt advising that the letter written on 01/14/2021 released her back to work with no restrictions as of 01/18/2021, and advised that she should have returned at that time. This MA advised pt to return call to office if there are any further questions.     Electronically signed by Helio Walker MA on 2/1/21 at 4:11 PM EST

## 2021-05-21 ENCOUNTER — TELEPHONE (OUTPATIENT)
Dept: ADMINISTRATIVE | Age: 66
End: 2021-05-21

## 2021-05-21 ENCOUNTER — OFFICE VISIT (OUTPATIENT)
Dept: PRIMARY CARE CLINIC | Age: 66
End: 2021-05-21
Payer: MEDICARE

## 2021-05-21 VITALS
HEIGHT: 62 IN | WEIGHT: 122 LBS | HEART RATE: 84 BPM | TEMPERATURE: 98 F | SYSTOLIC BLOOD PRESSURE: 116 MMHG | BODY MASS INDEX: 22.45 KG/M2 | DIASTOLIC BLOOD PRESSURE: 62 MMHG | RESPIRATION RATE: 18 BRPM | OXYGEN SATURATION: 99 %

## 2021-05-21 DIAGNOSIS — R52 PAIN: Primary | ICD-10-CM

## 2021-05-21 DIAGNOSIS — M25.512 ACUTE PAIN OF LEFT SHOULDER: ICD-10-CM

## 2021-05-21 PROBLEM — I76 SEPTIC EMBOLISM (HCC): Status: ACTIVE | Noted: 2021-05-21

## 2021-05-21 PROCEDURE — 99214 OFFICE O/P EST MOD 30 MIN: CPT | Performed by: CLINICAL NURSE SPECIALIST

## 2021-05-21 PROCEDURE — G8427 DOCREV CUR MEDS BY ELIG CLIN: HCPCS | Performed by: CLINICAL NURSE SPECIALIST

## 2021-05-21 PROCEDURE — 1036F TOBACCO NON-USER: CPT | Performed by: CLINICAL NURSE SPECIALIST

## 2021-05-21 PROCEDURE — G8420 CALC BMI NORM PARAMETERS: HCPCS | Performed by: CLINICAL NURSE SPECIALIST

## 2021-05-21 PROCEDURE — 3017F COLORECTAL CA SCREEN DOC REV: CPT | Performed by: CLINICAL NURSE SPECIALIST

## 2021-05-21 PROCEDURE — 96372 THER/PROPH/DIAG INJ SC/IM: CPT | Performed by: CLINICAL NURSE SPECIALIST

## 2021-05-21 RX ORDER — METHYLPREDNISOLONE 4 MG/1
TABLET ORAL
Qty: 21 TABLET | Refills: 0 | Status: SHIPPED
Start: 2021-05-21 | End: 2021-05-27 | Stop reason: ALTCHOICE

## 2021-05-21 RX ORDER — DEXAMETHASONE SODIUM PHOSPHATE 10 MG/ML
10 INJECTION INTRAMUSCULAR; INTRAVENOUS ONCE
Status: COMPLETED | OUTPATIENT
Start: 2021-05-21 | End: 2021-05-21

## 2021-05-21 RX ADMIN — DEXAMETHASONE SODIUM PHOSPHATE 10 MG: 10 INJECTION INTRAMUSCULAR; INTRAVENOUS at 13:26

## 2021-05-21 ASSESSMENT — ENCOUNTER SYMPTOMS
RESPIRATORY NEGATIVE: 1
ALLERGIC/IMMUNOLOGIC NEGATIVE: 1
GASTROINTESTINAL NEGATIVE: 1
BACK PAIN: 1
EYES NEGATIVE: 1

## 2021-05-21 NOTE — TELEPHONE ENCOUNTER
Patient called to be seen with PCP for Left arm, neck and left Shoulder pain. She lifted heavy object over her head at work-states she did not report it and will not go through [facility]. No availability today. She will go to either Walk in care or Urgent care. Advised of hours and location. Wear mask and go in alone. Pt agreeable and verbalized understanding.

## 2021-05-21 NOTE — PROGRESS NOTES
BRONCHOSCOPY N/A 2019    BRONCHOSCOPY ALVEOLAR LAVAGE performed by Danielle Frausto MD at [facility] ENDOSCOPY       Family History   Problem Relation Age of Onset    No Known Problems Mother     No Known Problems Father        Medications:     Current Outpatient Medications:     methylPREDNISolone (MEDROL DOSEPACK) 4 MG tablet, Take by mouth., Disp: 21 tablet, Rfl: 0    famotidine (PEPCID) 20 MG tablet, TAKE 1 TABLET BY MOUTH TWICE A DAY, Disp: 180 tablet, Rfl: 1    albuterol sulfate  (90 Base) MCG/ACT inhaler, Inhale 2 puffs into the lungs every 6 hours as needed for Wheezing, Disp: 1 Inhaler, Rfl: 3    vilazodone HCl (VILAZODONE HCL) 40 MG TABS, Take 40 mg by mouth daily, Disp: , Rfl:     ipratropium-albuterol (DUONEB) 0.5-2.5 (3) MG/3ML SOLN nebulizer solution, Inhale 3 mLs into the lungs every 4 hours (while awake) . , Disp: 360 mL, Rfl: 0    Allergies: Allergies   Allergen Reactions    Rifampin Other (See Comments)     States this medicine caused Renal Failure       Social History:     Social History     Tobacco Use    Smoking status: Former Smoker     Packs/day: 1.00     Years: 20.00     Pack years: 20.00     Types: Cigarettes     Start date: 1976     Quit date: 1997     Years since quittin.4    Smokeless tobacco: Never Used   Substance Use Topics    Alcohol use: No    Drug use: No       Patient lives at home. Physical Exam:     Vitals:    21 1236   BP: 116/62   Site: Right Upper Arm   Position: Sitting   Cuff Size: Medium Adult   Pulse: 84   Resp: 18   Temp: 98 °F (36.7 °C)   TempSrc: Temporal   SpO2: 99%   Weight: 122 lb (55.3 kg)   Height: 5' 2\" (1.575 m)       Exam:  Physical Exam  Vitals and nursing note reviewed. Constitutional:       General: She is not in acute distress. Appearance: Normal appearance. She is normal weight. She is not ill-appearing, toxic-appearing or diaphoretic. HENT:      Head: Normocephalic.       Right Ear: Tympanic membrane, ear canal and external ear normal. There is no impacted cerumen. Left Ear: Tympanic membrane, ear canal and external ear normal. There is no impacted cerumen. Nose: Nose normal. No congestion or rhinorrhea. Mouth/Throat:      Mouth: Mucous membranes are moist.      Pharynx: Oropharynx is clear. No oropharyngeal exudate or posterior oropharyngeal erythema. Eyes:      General: No scleral icterus. Right eye: No discharge. Left eye: No discharge. Extraocular Movements: Extraocular movements intact. Conjunctiva/sclera: Conjunctivae normal.      Pupils: Pupils are equal, round, and reactive to light. Neck:      Vascular: No carotid bruit. Cardiovascular:      Rate and Rhythm: Normal rate and regular rhythm. Pulses: Normal pulses. Heart sounds: Normal heart sounds. No murmur heard. No friction rub. No gallop. Pulmonary:      Effort: Pulmonary effort is normal. No respiratory distress. Breath sounds: Normal breath sounds. No stridor. No wheezing, rhonchi or rales. Chest:      Chest wall: No tenderness. Abdominal:      General: Abdomen is flat. Bowel sounds are normal. There is no distension. Palpations: Abdomen is soft. There is no mass. Tenderness: There is no abdominal tenderness. There is no right CVA tenderness, left CVA tenderness, guarding or rebound. Hernia: No hernia is present. Musculoskeletal:         General: Tenderness and signs of injury present. No swelling or deformity. Normal range of motion. Cervical back: Normal range of motion and neck supple. No rigidity. No muscular tenderness. Right lower leg: No edema. Left lower leg: No edema. Comments: Pain upon palpation to cervical spine, thoracic spine, left shoulder, left rotator cuff area and left upper arm. No edema or crepitus noted. Lymphadenopathy:      Cervical: No cervical adenopathy. Skin:     General: Skin is warm and dry.       Capillary Refill: Capillary refill takes less than 2 seconds. Coloration: Skin is not jaundiced or pale. Findings: No bruising, erythema, lesion or rash. Neurological:      General: No focal deficit present. Mental Status: She is alert and oriented to person, place, and time. Cranial Nerves: No cranial nerve deficit. Sensory: No sensory deficit. Motor: No weakness. Coordination: Coordination normal.      Gait: Gait normal.      Deep Tendon Reflexes: Reflexes normal.   Psychiatric:         Mood and Affect: Mood normal.         Behavior: Behavior normal.         Thought Content: Thought content normal.         Judgment: Judgment normal.           Testing:     xrays to cervical spine, thoracic spine, left shoulder      Medical Decision Making:     moderate      Clinical Impression:   Radhames Watters was seen today for neck pain and shoulder pain. Diagnoses and all orders for this visit:    Pain  -     XR SHOULDER LEFT (MIN 2 VIEWS); Future  -     XR CERVICAL SPINE (2-3 VIEWS); Future  -     XR THORACIC SPINE (3 VIEWS); Future    Acute pain of left shoulder  -     dexamethasone (DECADRON) injection 10 mg  -     methylPREDNISolone (MEDROL DOSEPACK) 4 MG tablet; Take by mouth. Reviewed medications, allergies and events leading to present complaint. Xrays ordered to cervical spine, thoracic spine and left shoulder. Decadron 10mg given IM. Medrol dose pack escribed to the pharmacy but she was instructed to begin tomorrow. Patient may use tylenol and heat to the left shoulder for pain relief. The patient is to call for any concerns or return if any of the signs or symptoms worsen. The patient is to follow-up with PCP in the next 2-3 days for repeat evaluation repeat assessment or go directly to the emergency department.      SIGNATURE: ALTHEA Smallwood - ANTOINETTE

## 2021-05-27 ENCOUNTER — HOSPITAL ENCOUNTER (OUTPATIENT)
Dept: GENERAL RADIOLOGY | Age: 66
Discharge: HOME | End: 2021-05-29
Payer: MEDICARE

## 2021-05-27 ENCOUNTER — HOSPITAL ENCOUNTER (OUTPATIENT)
Age: 66
Discharge: HOME | End: 2021-05-29
Payer: MEDICARE

## 2021-05-27 ENCOUNTER — OFFICE VISIT (OUTPATIENT)
Dept: FAMILY MEDICINE CLINIC | Age: 66
End: 2021-05-27
Payer: MEDICARE

## 2021-05-27 VITALS
RESPIRATION RATE: 18 BRPM | DIASTOLIC BLOOD PRESSURE: 62 MMHG | HEIGHT: 62 IN | TEMPERATURE: 98.1 F | SYSTOLIC BLOOD PRESSURE: 116 MMHG | HEART RATE: 69 BPM | OXYGEN SATURATION: 96 % | BODY MASS INDEX: 19.69 KG/M2 | WEIGHT: 107 LBS

## 2021-05-27 DIAGNOSIS — M25.512 LEFT SHOULDER PAIN, UNSPECIFIED CHRONICITY: ICD-10-CM

## 2021-05-27 DIAGNOSIS — D64.9 NORMOCYTIC ANEMIA: ICD-10-CM

## 2021-05-27 DIAGNOSIS — Z00.00 INITIAL MEDICARE ANNUAL WELLNESS VISIT: Primary | ICD-10-CM

## 2021-05-27 DIAGNOSIS — J44.9 CHRONIC OBSTRUCTIVE PULMONARY DISEASE, UNSPECIFIED COPD TYPE (HCC): ICD-10-CM

## 2021-05-27 DIAGNOSIS — Z13.220 LIPID SCREENING: ICD-10-CM

## 2021-05-27 DIAGNOSIS — K21.9 GASTROESOPHAGEAL REFLUX DISEASE WITHOUT ESOPHAGITIS: ICD-10-CM

## 2021-05-27 PROCEDURE — G0438 PPPS, INITIAL VISIT: HCPCS | Performed by: FAMILY MEDICINE

## 2021-05-27 PROCEDURE — 3017F COLORECTAL CA SCREEN DOC REV: CPT | Performed by: FAMILY MEDICINE

## 2021-05-27 PROCEDURE — 99213 OFFICE O/P EST LOW 20 MIN: CPT | Performed by: FAMILY MEDICINE

## 2021-05-27 PROCEDURE — 73010 X-RAY EXAM OF SHOULDER BLADE: CPT

## 2021-05-27 RX ORDER — ALBUTEROL SULFATE 90 UG/1
2 AEROSOL, METERED RESPIRATORY (INHALATION) EVERY 6 HOURS PRN
Qty: 3 INHALER | Refills: 1 | Status: SHIPPED
Start: 2021-05-27 | End: 2022-08-23 | Stop reason: SDUPTHER

## 2021-05-27 RX ORDER — FAMOTIDINE 20 MG/1
TABLET, FILM COATED ORAL
Qty: 180 TABLET | Refills: 1 | Status: SHIPPED | OUTPATIENT
Start: 2021-05-27

## 2021-05-27 RX ORDER — HYDROCODONE BITARTRATE AND ACETAMINOPHEN 5; 325 MG/1; MG/1
1 TABLET ORAL EVERY 8 HOURS PRN
Qty: 15 TABLET | Refills: 0 | Status: SHIPPED | OUTPATIENT
Start: 2021-05-27 | End: 2021-06-01

## 2021-05-27 RX ORDER — TRAMADOL HYDROCHLORIDE 50 MG/1
50 TABLET ORAL EVERY 8 HOURS PRN
Qty: 21 TABLET | Refills: 0 | Status: CANCELLED | OUTPATIENT
Start: 2021-05-27 | End: 2021-06-03

## 2021-05-27 RX ORDER — TIZANIDINE 2 MG/1
2 TABLET ORAL 3 TIMES DAILY PRN
Qty: 30 TABLET | Refills: 0 | Status: SHIPPED | OUTPATIENT
Start: 2021-05-27 | End: 2021-06-03

## 2021-05-27 ASSESSMENT — LIFESTYLE VARIABLES
HOW MANY STANDARD DRINKS CONTAINING ALCOHOL DO YOU HAVE ON A TYPICAL DAY: 0
HOW OFTEN DURING THE LAST YEAR HAVE YOU NEEDED AN ALCOHOLIC DRINK FIRST THING IN THE MORNING TO GET YOURSELF GOING AFTER A NIGHT OF HEAVY DRINKING: 0
HOW OFTEN DURING THE LAST YEAR HAVE YOU FAILED TO DO WHAT WAS NORMALLY EXPECTED FROM YOU BECAUSE OF DRINKING: 0
HAVE YOU OR SOMEONE ELSE BEEN INJURED AS A RESULT OF YOUR DRINKING: 0
AUDIT TOTAL SCORE: 1
HOW OFTEN DO YOU HAVE A DRINK CONTAINING ALCOHOL: 1
HOW OFTEN DURING THE LAST YEAR HAVE YOU FOUND THAT YOU WERE NOT ABLE TO STOP DRINKING ONCE YOU HAD STARTED: 0
AUDIT-C TOTAL SCORE: 1
HOW OFTEN DURING THE LAST YEAR HAVE YOU HAD A FEELING OF GUILT OR REMORSE AFTER DRINKING: 0
HOW OFTEN DURING THE LAST YEAR HAVE YOU BEEN UNABLE TO REMEMBER WHAT HAPPENED THE NIGHT BEFORE BECAUSE YOU HAD BEEN DRINKING: 0
HAS A RELATIVE, FRIEND, DOCTOR, OR ANOTHER HEALTH PROFESSIONAL EXPRESSED CONCERN ABOUT YOUR DRINKING OR SUGGESTED YOU CUT DOWN: 0
HOW OFTEN DO YOU HAVE SIX OR MORE DRINKS ON ONE OCCASION: 0

## 2021-05-27 ASSESSMENT — PATIENT HEALTH QUESTIONNAIRE - PHQ9
SUM OF ALL RESPONSES TO PHQ9 QUESTIONS 1 & 2: 2
2. FEELING DOWN, DEPRESSED OR HOPELESS: 1
SUM OF ALL RESPONSES TO PHQ QUESTIONS 1-9: 2
1. LITTLE INTEREST OR PLEASURE IN DOING THINGS: 1
SUM OF ALL RESPONSES TO PHQ QUESTIONS 1-9: 2
SUM OF ALL RESPONSES TO PHQ QUESTIONS 1-9: 2

## 2021-05-27 NOTE — PROGRESS NOTES
MD Aleyda as PCP - [facility]    Wt Readings from Last 3 Encounters:   06/03/21 109 lb (49.4 kg)   05/27/21 107 lb (48.5 kg)   05/21/21 122 lb (55.3 kg)     Vitals:    05/27/21 1519   BP: 116/62   Pulse: 69   Resp: 18   Temp: 98.1 °F (36.7 °C)   SpO2: 96%   Weight: 107 lb (48.5 kg)   Height: 5' 2\" (1.575 m)     Body mass index is 19.57 kg/m². Based upon direct observation of the patient, evaluation of cognition reveals recent and remote memory intact. Review of Systems   Constitutional: Negative for appetite change, fatigue and fever. Respiratory: Negative for cough, shortness of breath and wheezing. Cardiovascular: Negative for chest pain and palpitations. Gastrointestinal: Negative for abdominal pain, constipation, diarrhea, nausea and vomiting. MSK: positive left shoulder pain, no deformity. Physical Exam  Constitutional:       General: She is not in acute distress. Appearance: She is well-developed. She is not diaphoretic. HENT:      Head: Normocephalic and atraumatic. Eyes:      Conjunctiva/sclera: Conjunctivae normal.      Pupils: Pupils are equal, round, and reactive to light. Cardiovascular:      Rate and Rhythm: Normal rate and regular rhythm. Pulmonary:      Effort: Pulmonary effort is normal.      Breath sounds: Normal breath sounds. Abdominal:      General: Bowel sounds are normal. There is no distension. Palpations: Abdomen is soft. Tenderness: There is no abdominal tenderness. Hernia: No hernia is present. Musculoskeletal:      Cervical back: Normal range of motion and neck supple. Patient is holding her left shoulder. Pinpoint tenderness over left shoulder. No swelling. Skin:     General: Skin is warm and dry. Neurological:      Mental Status: She is alert and oriented to person, place, and time. Patient's complete Health Risk Assessment and screening values have been reviewed and are found in Flowsheets.  The following problems were reviewed today and where indicated follow up appointments were made and/or referrals ordered. Positive Risk Factor Screenings with Interventions:            General Health and ACP:  General  In general, how would you say your health is?: Fair  In the past 7 days, have you experienced any of the following? New or Increased Pain, New or Increased Fatigue, Loneliness, Social Isolation, Stress or Anger?: (!) New or Increased Pain, New or Increased Fatigue, Social Isolation  Do you get the social and emotional support that you need?: Yes  Do you have a Living Will?: Yes  Advance Directives     Power of  Living Will ACP-Advance Directive ACP-Power of     Not on File Not on File Not on File Not on File      General Health Risk Interventions:  · left shoulder after lifting. Patient seen in UC and shoulder xr wnl and rx steroids. Scapula xray ordered. Patient reports pain is not controlled with otc analgesics. Oarrs reviewed. Short course of Norco rx. Muscle relaxer rx. Rtc in 1 week for f/u. 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 Health Habits/Nutrition:  Health Habits/Nutrition  Do you exercise for at least 20 minutes 2-3 times per week?: (!) No  Have you lost any weight without trying in the past 3 months?: No  Do you eat only one meal per day?: No  Have you seen the dentist within the past year?: (!) No  Body mass index: 19.57     Safety:  Safety  Do you have working smoke detectors?: Yes  Have all throw rugs been removed or fastened?: (!) No  Do you have non-slip mats or surfaces in all bathtubs/showers?: Yes  Do all of your stairways have a railing or banister?: Yes  Are your doorways, halls and stairs free of clutter?: (!) No  Do you always fasten your seatbelt when you are in a car?: (!) No      Recommend patient see a dentist at least once a year. Recommend patient clean up clutter and remove or fasten all rugs.  Also encourage patient to use her seat belt regularly. Patient encouraged to exercise 150 min/week as tolerated. Personalized Preventive Plan   Current Health Maintenance Status  Immunization History   Administered Date(s) Administered    Influenza Vaccine, unspecified formulation 08/22/2017    Influenza Virus Vaccine 10/30/2019    Influenza, High Dose (Fluzone 65 yrs and older) 11/06/2018    Influenza, Intradermal, Quadrivalent, Preservative Free 11/02/2018    MMR 08/27/2009    Pneumococcal Conjugate 13-valent (Gwenyth Plenty) 11/06/2018        Health Maintenance   Topic Date Due    DTaP/Tdap/Td vaccine (1 - Tdap) Never done    Cervical cancer screen  Never done    Lipid screen  Never done    Breast cancer screen  Never done    Shingles Vaccine (1 of 2) Never done    Pneumococcal 0-64 years Vaccine (1 of 2 - PPSV23) 01/01/2019    Annual Wellness Visit (AWV)  Never done    COVID-19 Vaccine (2 - Moderna 2-dose series) 04/17/2021    Flu vaccine (Season Ended) 09/01/2021    Colon cancer screen colonoscopy  07/03/2030    Hepatitis C screen  Completed    HIV screen  Completed    Hepatitis A vaccine  Aged Out    Hepatitis B vaccine  Aged Out    Hib vaccine  Aged Out    Meningococcal (ACWY) vaccine  Aged Out     Recommendations for Xenome Due: see orders and patient instructions/AVS.  . Recommended screening schedule for the next 5-10 years is provided to the patient in written form: see Patient Instructions/AVS.      Rosy Eduardo was seen today for medicare awv and shoulder pain. Diagnoses and all orders for this visit:    Initial Medicare annual wellness visit  Physical as above. Patient only got one dose of covid vaccine and notes, \"I don't trust the government. \" She declines second covid vaccine and shingles vaccine. Tdap recommended. Pneumonia vaccine received. Colonoscopy 7/2020 with repeat recommended in 3 years secondary to suboptimal prep. Postmenopausal with no vaginal bleeding. Encourage patient to make pap apt. Previous mammogram placed. Flu vaccine recommended in Fall. Recommend DEXA scan. Depression screen negative. Left shoulder pain, unspecified chronicity  -     XR SCAPULA LEFT (COMPLETE); Future  -     tiZANidine (ZANAFLEX) 2 MG tablet; Take 1 tablet by mouth 3 times daily as needed (muscle spasm)  -     HYDROcodone-acetaminophen (NORCO) 5-325 MG per tablet; Take 1 tablet by mouth every 8 hours as needed for Pain for up to 5 days. Intended supply: 7 days. Take lowest dose possible to manage pain    Gastroesophageal reflux disease without esophagitis  -     famotidine (PEPCID) 20 MG tablet; TAKE 1 TABLET BY MOUTH TWICE A DAY  -     Comprehensive Metabolic Panel; Future    Chronic obstructive pulmonary disease, unspecified COPD type (HCC)  -     albuterol sulfate  (90 Base) MCG/ACT inhaler; Inhale 2 puffs into the lungs every 6 hours as needed for Wheezing    Normocytic anemia  -     CBC Auto Differential; Future  -     Vitamin B12; Future  -     Transferrin; Future  -     Iron and TIBC; Future  -     Ferritin; Future  -     Folate; Future    Lipid screening  -     Lipid Panel;  Future

## 2021-05-27 NOTE — PATIENT INSTRUCTIONS
Personalized Preventive Plan for Jarek Roles - 5/27/2021  Medicare offers a range of preventive health benefits. Some of the tests and screenings are paid in full while other may be subject to a deductible, co-insurance, and/or copay. Some of these benefits include a comprehensive review of your medical history including lifestyle, illnesses that may run in your family, and various assessments and screenings as appropriate. After reviewing your medical record and screening and assessments performed today your provider may have ordered immunizations, labs, imaging, and/or referrals for you. A list of these orders (if applicable) as well as your Preventive Care list are included within your After Visit Summary for your review. Other Preventive Recommendations:    · A preventive eye exam performed by an eye specialist is recommended every 1-2 years to screen for glaucoma; cataracts, macular degeneration, and other eye disorders. · A preventive dental visit is recommended every 6 months. · Try to get at least 150 minutes of exercise per week or 10,000 steps per day on a pedometer . · Order or download the FREE \"Exercise & Physical Activity: Your Everyday Guide\" from [employer]. Call 6-286.893.5077 or search [employer] online. · You need 3548-4068 mg of calcium and 4602-9005 IU of vitamin D per day. It is possible to meet your calcium requirement with diet alone, but a vitamin D supplement is usually necessary to meet this goal.  · When exposed to the sun, use a sunscreen that protects against both UVA and UVB radiation with an SPF of 30 or greater. Reapply every 2 to 3 hours or after sweating, drying off with a towel, or swimming. · Always wear a seat belt when traveling in a car. Always wear a helmet when riding a bicycle or motorcycle.

## 2021-06-03 ENCOUNTER — OFFICE VISIT (OUTPATIENT)
Dept: FAMILY MEDICINE CLINIC | Age: 66
End: 2021-06-03
Payer: MEDICARE

## 2021-06-03 VITALS
SYSTOLIC BLOOD PRESSURE: 108 MMHG | RESPIRATION RATE: 16 BRPM | HEIGHT: 62 IN | DIASTOLIC BLOOD PRESSURE: 68 MMHG | HEART RATE: 73 BPM | OXYGEN SATURATION: 98 % | WEIGHT: 109 LBS | TEMPERATURE: 97.5 F | BODY MASS INDEX: 20.06 KG/M2

## 2021-06-03 DIAGNOSIS — M25.512 LEFT SHOULDER PAIN, UNSPECIFIED CHRONICITY: Primary | ICD-10-CM

## 2021-06-03 PROCEDURE — G8427 DOCREV CUR MEDS BY ELIG CLIN: HCPCS | Performed by: FAMILY MEDICINE

## 2021-06-03 PROCEDURE — 3017F COLORECTAL CA SCREEN DOC REV: CPT | Performed by: FAMILY MEDICINE

## 2021-06-03 PROCEDURE — G8420 CALC BMI NORM PARAMETERS: HCPCS | Performed by: FAMILY MEDICINE

## 2021-06-03 PROCEDURE — 1036F TOBACCO NON-USER: CPT | Performed by: FAMILY MEDICINE

## 2021-06-03 PROCEDURE — 99213 OFFICE O/P EST LOW 20 MIN: CPT | Performed by: FAMILY MEDICINE

## 2021-06-03 NOTE — PROGRESS NOTES
[facility])  Family Medicine Outpatient        SUBJECTIVE:  CC: had concerns including Shoulder Pain (1 week follow up ; left shoulder pain, improving but still present at times, mostly with use of arm, or in the morning time. ). HPI:Lisa North presented to the clinic for a routine visit. Buster Dowell is a 63year old female presenting to the office today to follow up on her acute left shoulder pain that started mid May. She was seen in the ED  and subsequently in the office . She was treated with a steroid burst along with a muscle relaxer and a short course of Norco. She reports it feeling a lot better today. She is using prn otc analgesics along with a muscle relaxor, tens unit, and home PT exercises. Left Scapula XR 21  FINDINGS:   There is no evidence of acute fracture.  There is normal alignment.  No acute   joint abnormality.  No focal osseous lesion. No focal soft tissue abnormality.           Impression   No acute osseous abnormality. Review of Systems   Constitutional: Negative for appetite change, fatigue and fever. Respiratory: Negative for cough, shortness of breath and wheezing. Cardiovascular: Negative for chest pain and palpitations. Gastrointestinal: Negative for abdominal pain, constipation, diarrhea, nausea and vomiting. Musculoskeletal: Positive for arthralgias. Negative for gait problem.        Outpatient Medications Marked as Taking for the 6/3/21 encounter (Office Visit) with Emily Berry MD   Medication Sig Dispense Refill    famotidine (PEPCID) 20 MG tablet TAKE 1 TABLET BY MOUTH TWICE A  tablet 1    albuterol sulfate  (90 Base) MCG/ACT inhaler Inhale 2 puffs into the lungs every 6 hours as needed for Wheezing 3 Inhaler 1    [] tiZANidine (ZANAFLEX) 2 MG tablet Take 1 tablet by mouth 3 times daily as needed (muscle spasm) 30 tablet 0    vilazodone HCl (VILAZODONE HCL) 40 MG TABS Take 40 mg by mouth daily      ipratropium-albuterol (DUONEB) 0.5-2.5 (3) MG/3ML SOLN nebulizer solution Inhale 3 mLs into the lungs every 4 hours (while awake) . 360 mL 0       I have reviewed all pertinent PMHx, PSHx, FamHx, SocialHx, medications, and allergies and updated history as appropriate. OBJECTIVE    VS: /68   Pulse 73   Temp 97.5 °F (36.4 °C)   Resp 16   Ht 5' 2\" (1.575 m)   Wt 109 lb (49.4 kg)   LMP  (LMP Unknown)   SpO2 98%   BMI 19.94 kg/m²   Physical Exam  Constitutional:       General: She is not in acute distress. Appearance: She is well-developed. She is not diaphoretic. HENT:      Head: Normocephalic and atraumatic. Eyes:      Conjunctiva/sclera: Conjunctivae normal.      Pupils: Pupils are equal, round, and reactive to light. Cardiovascular:      Rate and Rhythm: Normal rate and regular rhythm. Pulmonary:      Effort: Pulmonary effort is normal.      Breath sounds: Normal breath sounds. Abdominal:      General: Bowel sounds are normal. There is no distension. Palpations: Abdomen is soft. Tenderness: There is no abdominal tenderness. Hernia: No hernia is present. Musculoskeletal:         General: Tenderness (left trap pinpoint) present. No swelling. Normal range of motion. Cervical back: Normal range of motion and neck supple. Skin:     General: Skin is warm and dry. Neurological:      Mental Status: She is alert and oriented to person, place, and time. Sensory: No sensory deficit. Motor: No weakness. ASSESSMENT/PLAN:  1. Left shoulder pain, unspecified chronicity  Residual now. Ok to go back to work at this time. Continue regimen as discussed. I have reviewed my findings and recommendations with Ariana Perez MD  6/28/2021 12:12 PM     Counseled regarding above diagnosis, including possible risks and complications, especially if left uncontrolled.     Patient counseled on red flag symptoms and if they occur to go to the ED.     Discussed medications risk/benefits and possible side effects and alternatives to treatment. Patient and/or guardian verbalizes understanding, agrees, feels comfortable with and wishes to proceed with above treatment plan. Advised patient regarding importance of keeping up with recommended health maintenance and to schedule as soon as possible if overdue, as this is important in assessing for undiagnosed pathology, especially cancer, as well as protecting against potentially harmful/life threatening disease. Patient and/or guardian verbalizes understanding and agrees with above counseling, assessment and plan. All questions answered. Please note this report has been partially produced using speech recognition software  and may contain errors related to that system including grammar, punctuation and spelling as well as words and phrases that may seem inappropriate. If there are questions or concerns please feel free to contact me to clarify.

## 2021-06-03 NOTE — PATIENT INSTRUCTIONS
Patient Education        Neck: Exercises  Introduction  Here are some examples of exercises for you to try. The exercises may be suggested for a condition or for rehabilitation. Start each exercise slowly. Ease off the exercises if you start to have pain. You will be told when to start these exercises and which ones will work best for you. How to do the exercises  Neck stretch   1. This stretch works best if you keep your shoulder down as you lean away from it. To help you remember to do this, start by relaxing your shoulders and lightly holding on to your thighs or your chair. 2. Tilt your head toward your shoulder and hold for 15 to 30 seconds. Let the weight of your head stretch your muscles. 3. If you would like a little added stretch, use your hand to gently and steadily pull your head toward your shoulder. For example, keeping your right shoulder down, lean your head to the left. 4. Repeat 2 to 4 times toward each shoulder. Diagonal neck stretch   1. Turn your head slightly toward the direction you will be stretching, and tilt your head diagonally toward your chest and hold for 15 to 30 seconds. 2. If you would like a little added stretch, use your hand to gently and steadily pull your head forward on the diagonal.  3. Repeat 2 to 4 times toward each side. Dorsal glide stretch   The dorsal glide stretches the back of the neck. If you feel pain, do not glide so far back. Some people find this exercise easier to do while lying on their backs with an ice pack on the neck. 1. Sit or stand tall and look straight ahead. 2. Slowly tuck your chin as you glide your head backward over your body  3. Hold for a count of 6, and then relax for up to 10 seconds. 4. Repeat 8 to 12 times. Chest and shoulder stretch   1. Sit or stand tall and glide your head backward as in the dorsal glide stretch. 2. Raise both arms so that your hands are next to your ears.   3. Take a deep breath, and as you breathe out, lower your elbows down and behind your back. You will feel your shoulder blades slide down and together, and at the same time you will feel a stretch across your chest and the front of your shoulders. 4. Hold for about 6 seconds, and then relax for up to 10 seconds. 5. Repeat 8 to 12 times. Strengthening: Hands on head   1. Move your head backward, forward, and side to side against gentle pressure from your hands, holding each position for about 6 seconds. 2. Repeat 8 to 12 times. Follow-up care is a key part of your treatment and safety. Be sure to make and go to all appointments, and call your doctor if you are having problems. It's also a good idea to know your test results and keep a list of the medicines you take. Where can you learn more? Go to [email or web address]://[email or web address] org and sign in to your [employer] account. Enter P975 in the [employer] box to learn more about \"Neck: Exercises. \"     If you do not have an account, please click on the \"Sign Up Now\" link. Current as of: November 16, 2020               Content Version: 12.8  © 0727-7419 [employer]. Care instructions adapted under license by [facility] ([facility]). If you have questions about a medical condition or this instruction, always ask your healthcare professional. [address] any warranty or liability for your use of this information.

## 2021-06-28 ASSESSMENT — ENCOUNTER SYMPTOMS
DIARRHEA: 0
VOMITING: 0
COUGH: 0
ABDOMINAL PAIN: 0
CONSTIPATION: 0
SHORTNESS OF BREATH: 0
NAUSEA: 0
WHEEZING: 0

## 2022-04-20 ENCOUNTER — OFFICE VISIT (OUTPATIENT)
Dept: PRIMARY CARE CLINIC | Age: 67
End: 2022-04-20
Payer: MEDICARE

## 2022-04-20 VITALS
DIASTOLIC BLOOD PRESSURE: 65 MMHG | OXYGEN SATURATION: 94 % | TEMPERATURE: 97.7 F | HEART RATE: 88 BPM | WEIGHT: 103 LBS | SYSTOLIC BLOOD PRESSURE: 111 MMHG | HEIGHT: 62 IN | BODY MASS INDEX: 18.95 KG/M2 | RESPIRATION RATE: 20 BRPM

## 2022-04-20 DIAGNOSIS — U07.1 COVID-19 VIRUS INFECTION: Primary | ICD-10-CM

## 2022-04-20 DIAGNOSIS — Z86.19 H/O MYCOBACTERIUM AVIUM COMPLEX INFECTION: ICD-10-CM

## 2022-04-20 DIAGNOSIS — J44.9 CHRONIC OBSTRUCTIVE PULMONARY DISEASE, UNSPECIFIED COPD TYPE (HCC): ICD-10-CM

## 2022-04-20 LAB
Lab: ABNORMAL
PERFORMING INSTRUMENT: ABNORMAL
QC PASS/FAIL: ABNORMAL
SARS-COV-2, POC: DETECTED

## 2022-04-20 PROCEDURE — 99213 OFFICE O/P EST LOW 20 MIN: CPT | Performed by: NURSE PRACTITIONER

## 2022-04-20 PROCEDURE — 3023F SPIROM DOC REV: CPT | Performed by: NURSE PRACTITIONER

## 2022-04-20 PROCEDURE — 3017F COLORECTAL CA SCREEN DOC REV: CPT | Performed by: NURSE PRACTITIONER

## 2022-04-20 PROCEDURE — G8420 CALC BMI NORM PARAMETERS: HCPCS | Performed by: NURSE PRACTITIONER

## 2022-04-20 PROCEDURE — G8427 DOCREV CUR MEDS BY ELIG CLIN: HCPCS | Performed by: NURSE PRACTITIONER

## 2022-04-20 PROCEDURE — 87426 SARSCOV CORONAVIRUS AG IA: CPT | Performed by: NURSE PRACTITIONER

## 2022-04-20 PROCEDURE — 1036F TOBACCO NON-USER: CPT | Performed by: NURSE PRACTITIONER

## 2022-04-20 RX ORDER — DEXAMETHASONE 6 MG/1
6 TABLET ORAL 2 TIMES DAILY WITH MEALS
Qty: 10 TABLET | Refills: 0 | Status: SHIPPED | OUTPATIENT
Start: 2022-04-20 | End: 2022-04-25

## 2022-04-20 RX ORDER — DOXYCYCLINE HYCLATE 100 MG
100 TABLET ORAL 2 TIMES DAILY
Qty: 20 TABLET | Refills: 0 | Status: SHIPPED | OUTPATIENT
Start: 2022-04-20 | End: 2022-04-30

## 2022-04-20 RX ORDER — ERGOCALCIFEROL 1.25 MG/1
CAPSULE ORAL
Qty: 2 CAPSULE | Refills: 0 | Status: SHIPPED
Start: 2022-04-20 | End: 2022-05-05 | Stop reason: CLARIF

## 2022-04-20 SDOH — ECONOMIC STABILITY: FOOD INSECURITY: WITHIN THE PAST 12 MONTHS, YOU WORRIED THAT YOUR FOOD WOULD RUN OUT BEFORE YOU GOT MONEY TO BUY MORE.: NEVER TRUE

## 2022-04-20 SDOH — ECONOMIC STABILITY: FOOD INSECURITY: WITHIN THE PAST 12 MONTHS, THE FOOD YOU BOUGHT JUST DIDN'T LAST AND YOU DIDN'T HAVE MONEY TO GET MORE.: NEVER TRUE

## 2022-04-20 ASSESSMENT — SOCIAL DETERMINANTS OF HEALTH (SDOH): HOW HARD IS IT FOR YOU TO PAY FOR THE VERY BASICS LIKE FOOD, HOUSING, MEDICAL CARE, AND HEATING?: NOT HARD AT ALL

## 2022-04-20 NOTE — PROGRESS NOTES
Chief Complaint:   Pharyngitis (tested positive for covid with a home test), Cough, Generalized Body Aches, Fatigue, and Shortness of Breath      History of Present Illness   Source of history provided by:  patient. Craig Goff is a 61 y.o. old female with a past medical history of:   Past Medical History:   Diagnosis Date    Bron[facility]asis ([facility]     COPD (chronic obstructive pulmonary disease) ([facility]     Mycobacterium avium complex ([facility]         Pt presents to the [facility] care with a Cough/SOB/Body Aches/Fatigue/Sore Throat for the past 3 days. Pt took a home Covid test today and was POSITIVE. States the cough is  productive with yellow sputum. No fever noted. Denies any N/V/D, abdominal pain, CP,  dizziness, or any other concerning issues. Pt does have a significant h/o COPD and Mycobacterium Avium Complex Infection, Pt does use supplemental oxygen 2 liters only at night, pt has not needed to increase oxygen over the past few days    . ROS    Unless otherwise stated in this report or unable to obtain because of the patient's clinical or mental status as evidenced by the medical record, this patients's positive and negative responses for Review of Systems, constitutional, psych, eyes, ENT, cardiovascular, respiratory, gastrointestinal, neurological, genitourinary, musculoskeletal, integument systems and systems related to the presenting problem are either stated in the preceding or were not pertinent or were negative for the symptoms and/or complaints related to the medical problem. Past Surgical History:  has a past surgical history that includes bronchoscopy (N/A, 12/30/2019). Social History:  reports that she quit smoking about 24 years ago. Her smoking use included cigarettes. She started smoking about 45 years ago. She has a 20.00 pack-year smoking history. She has never used smokeless tobacco. She reports that she does not drink alcohol and does not use drugs.   Family History: family history includes No Known Problems in her father and mother. Allergies: Rifampin    Physical Exam         VS:  /65 (Site: Right Upper Arm, Position: Sitting, Cuff Size: Medium Adult)   Pulse 88   Temp 97.7 °F (36.5 °C) (Temporal)   Resp 20   Ht 5' 2\" (1.575 m)   Wt 103 lb (46.7 kg)   LMP  (LMP Unknown)   SpO2 94%   BMI 18.84 kg/m²       Oxygen Saturation Interpretation: Normal.    Constitutional:  Alert, development consistent with age. Ears:  External Ears: Normal bilateral pinna. TM's & External Canals: TM's normal bilaterally without perforation. Canals without erythema or drainage. Nose:   There is no obvious septal defect. Mild redness/edema  Mouth:  Moist bucca mucosa and normal tongue. Throat: Mild posterior pharyngeal erythema without exudates or lesions. Neck:  Supple. There is no obvious adenopathy or neck tenderness. Lungs:   Breath sounds: Normal chest expansion and breath sounds: moderately decreased bilaterally, nonproductive cough present w/deep breathing, pulse ox 94% on r/a  Heart:  Regular rate and rhythm, normal heart sounds, without pathological murmurs, ectopy, gallops, or rubs. Skin:  Normal turgor. Warm, dry, without visible rash. Neurological:  Oriented. Motor functions intact. Lab / Imaging Results   (All laboratory and radiology results have been personally reviewed by myself)  Labs:  Results for orders placed or performed in visit on 04/20/22   POCT COVID-19, Antigen   Result Value Ref Range    SARS-COV-2, POC Detected (A) Not Detected    Lot Number [id number]     QC Pass/Fail pass     Performing Instrument BD Veritor        Imaging: All Radiology results interpreted by Radiologist unless otherwise noted. No orders to display         Assessment / Plan     Impression(s):  1. COVID-19 virus infection    2. Chronic obstructive pulmonary disease, unspecified COPD type ([facility]    3.  H/O Mycobacterium avium complex infection Disposition:  Disposition: Advised Covid test is POSITIVE, pt is candidate and does meet criteria for oral antiviral treatment on outpatient basis-completed paperwork and faxed to Kirill Blanchard. Advised to start Doxycycline, Decadron and Vitamin D as ordered, stay well hydrated, go to ED immediately w/any worsening or concerning issues    ER if changes or worse. Advised to take all medications as directed.

## 2022-04-20 NOTE — LETTER
[facility] [place]  . [address] [facility] [address]  Phone: [phone]  Fax: [address] Srinivasan, ALTHEA - CNP        April 20, 2022     Patient: Raj Eaton   YOB: 1955   Date of Visit: 4/20/2022       To Whom It May Concern: It is my medical opinion that Raj Eaton should remain out of work until medically cleared. Pt tested POSITIVE for Covid Virus on 4/20/22. If you have any questions or concerns, please don't hesitate to call.     Sincerely,        ALTHEA Black - CNP

## 2022-04-29 ENCOUNTER — TELEPHONE (OUTPATIENT)
Dept: FAMILY MEDICINE CLINIC | Age: 67
End: 2022-04-29

## 2022-04-29 NOTE — TELEPHONE ENCOUNTER
Call transferred to office from [facility] representative with red flag complaint \"dizziness\". This MA spoke with pt regarding her symptoms. Pt states that she was diagnosed with COVID 4/20 and has been experiencing severe dizziness. Pt states \"I feel like I'm drunk, the whole room is spinning\". Pt then states that she is not dizzy constantly, it's off and on. Pt then expressed concern about her blood pressure. Pt states \"I just don't want my blood pressure to spike\". Pt denies h/o HTN. Pt does not have a way to check blood pressure at home. Pt advised to seek care at Walk In for symptoms. This MA inquired if pt had someone that could drive her to the walk in clinic due to her dizziness. Pt states that she has no one at this time that could drive her, but she feels well enough to drive. Pt denies dizziness at the moment. Pt advised for her safety it may be best to find someone to drive her. Pt again states that she feels confident driving herself. Pt did schedule for follow up on 5/5 with Dr. Kanchan Kim.     Electronically signed by Josiah Mariano MA on 4/29/22 at 10:33 AM EDT

## 2022-05-03 NOTE — TELEPHONE ENCOUNTER
Pt was only seen at Walk In 4/20 - scheduled 5/5 for follow up.     Electronically signed by Marii Wen MA on 5/3/22 at 12:46 PM EDT

## 2022-05-03 NOTE — TELEPHONE ENCOUNTER
Please contact patient to see how she is feeling. Also, find out where she was seen at walk-in and get report.

## 2022-05-05 ENCOUNTER — OFFICE VISIT (OUTPATIENT)
Dept: FAMILY MEDICINE CLINIC | Age: 67
End: 2022-05-05
Payer: MEDICARE

## 2022-05-05 ENCOUNTER — HOSPITAL ENCOUNTER (OUTPATIENT)
Age: 67
Discharge: HOME | End: 2022-05-05
Payer: MEDICARE

## 2022-05-05 ENCOUNTER — HOSPITAL ENCOUNTER (OUTPATIENT)
Dept: CT IMAGING | Age: 67
Discharge: HOME | End: 2022-05-07
Payer: MEDICARE

## 2022-05-05 ENCOUNTER — TELEPHONE (OUTPATIENT)
Dept: PULMONOLOGY | Age: 67
End: 2022-05-05

## 2022-05-05 VITALS
RESPIRATION RATE: 18 BRPM | WEIGHT: 106 LBS | SYSTOLIC BLOOD PRESSURE: 102 MMHG | BODY MASS INDEX: 19.51 KG/M2 | OXYGEN SATURATION: 97 % | DIASTOLIC BLOOD PRESSURE: 70 MMHG | HEART RATE: 74 BPM | HEIGHT: 62 IN | TEMPERATURE: 98.4 F

## 2022-05-05 DIAGNOSIS — A31.0 PULMONARY MYCOBACTERIUM AVIUM COMPLEX (MAC) INFECTION (HCC): ICD-10-CM

## 2022-05-05 DIAGNOSIS — J47.9 BRONCHIECTASIS WITHOUT COMPLICATION (HCC): ICD-10-CM

## 2022-05-05 DIAGNOSIS — J44.9 CHRONIC OBSTRUCTIVE PULMONARY DISEASE, UNSPECIFIED COPD TYPE (HCC): ICD-10-CM

## 2022-05-05 DIAGNOSIS — Z86.16 HISTORY OF COVID-19: ICD-10-CM

## 2022-05-05 DIAGNOSIS — G44.201 ACUTE INTRACTABLE TENSION-TYPE HEADACHE: ICD-10-CM

## 2022-05-05 DIAGNOSIS — Z86.16 HISTORY OF COVID-19: Primary | ICD-10-CM

## 2022-05-05 LAB
ALBUMIN SERPL-MCNC: 4.1 G/DL (ref 3.5–5.2)
ALP BLD-CCNC: 76 U/L (ref 35–104)
ALT SERPL-CCNC: 14 U/L (ref 0–32)
ANION GAP SERPL CALCULATED.3IONS-SCNC: 9 MMOL/L (ref 7–16)
AST SERPL-CCNC: 22 U/L (ref 0–31)
BASOPHILS ABSOLUTE: 0.06 E9/L (ref 0–0.2)
BASOPHILS RELATIVE PERCENT: 0.7 % (ref 0–2)
BILIRUB SERPL-MCNC: <0.2 MG/DL (ref 0–1.2)
BUN BLDV-MCNC: 23 MG/DL (ref 6–23)
CALCIUM SERPL-MCNC: 9.3 MG/DL (ref 8.6–10.2)
CHLORIDE BLD-SCNC: 99 MMOL/L (ref 98–107)
CO2: 31 MMOL/L (ref 22–29)
CREAT SERPL-MCNC: 1 MG/DL (ref 0.5–1)
EOSINOPHILS ABSOLUTE: 0.18 E9/L (ref 0.05–0.5)
EOSINOPHILS RELATIVE PERCENT: 2 % (ref 0–6)
GFR AFRICAN AMERICAN: >60
GFR NON-AFRICAN AMERICAN: 56 ML/MIN/1.73
GLUCOSE BLD-MCNC: 87 MG/DL (ref 74–99)
HCT VFR BLD CALC: 41.1 % (ref 34–48)
HEMOGLOBIN: 12.6 G/DL (ref 11.5–15.5)
IMMATURE GRANULOCYTES #: 0.04 E9/L
IMMATURE GRANULOCYTES %: 0.4 % (ref 0–5)
LYMPHOCYTES ABSOLUTE: 1.38 E9/L (ref 1.5–4)
LYMPHOCYTES RELATIVE PERCENT: 15.4 % (ref 20–42)
MCH RBC QN AUTO: 29.5 PG (ref 26–35)
MCHC RBC AUTO-ENTMCNC: 30.7 % (ref 32–34.5)
MCV RBC AUTO: 96.3 FL (ref 80–99.9)
MONOCYTES ABSOLUTE: 0.74 E9/L (ref 0.1–0.95)
MONOCYTES RELATIVE PERCENT: 8.2 % (ref 2–12)
NEUTROPHILS ABSOLUTE: 6.57 E9/L (ref 1.8–7.3)
NEUTROPHILS RELATIVE PERCENT: 73.3 % (ref 43–80)
PDW BLD-RTO: 13.3 FL (ref 11.5–15)
PLATELET # BLD: 340 E9/L (ref 130–450)
PMV BLD AUTO: 9.3 FL (ref 7–12)
POTASSIUM SERPL-SCNC: 4.3 MMOL/L (ref 3.5–5)
RBC # BLD: 4.27 E12/L (ref 3.5–5.5)
SEDIMENTATION RATE, ERYTHROCYTE: 50 MM/HR (ref 0–20)
SODIUM BLD-SCNC: 139 MMOL/L (ref 132–146)
TOTAL PROTEIN: 7.6 G/DL (ref 6.4–8.3)
WBC # BLD: 9 E9/L (ref 4.5–11.5)

## 2022-05-05 PROCEDURE — 85025 COMPLETE CBC W/AUTO DIFF WBC: CPT

## 2022-05-05 PROCEDURE — 80053 COMPREHEN METABOLIC PANEL: CPT

## 2022-05-05 PROCEDURE — 71250 CT THORAX DX C-: CPT

## 2022-05-05 PROCEDURE — 1036F TOBACCO NON-USER: CPT | Performed by: FAMILY MEDICINE

## 2022-05-05 PROCEDURE — 3017F COLORECTAL CA SCREEN DOC REV: CPT | Performed by: FAMILY MEDICINE

## 2022-05-05 PROCEDURE — G8420 CALC BMI NORM PARAMETERS: HCPCS | Performed by: FAMILY MEDICINE

## 2022-05-05 PROCEDURE — 36415 COLL VENOUS BLD VENIPUNCTURE: CPT

## 2022-05-05 PROCEDURE — 3023F SPIROM DOC REV: CPT | Performed by: FAMILY MEDICINE

## 2022-05-05 PROCEDURE — G8427 DOCREV CUR MEDS BY ELIG CLIN: HCPCS | Performed by: FAMILY MEDICINE

## 2022-05-05 PROCEDURE — 99215 OFFICE O/P EST HI 40 MIN: CPT | Performed by: FAMILY MEDICINE

## 2022-05-05 PROCEDURE — 85651 RBC SED RATE NONAUTOMATED: CPT

## 2022-05-05 RX ORDER — PREDNISONE 20 MG/1
20 TABLET ORAL 2 TIMES DAILY
Qty: 14 TABLET | Refills: 0 | Status: SHIPPED | OUTPATIENT
Start: 2022-05-05 | End: 2022-05-12

## 2022-05-05 RX ORDER — GUAIFENESIN 600 MG/1
600 TABLET, EXTENDED RELEASE ORAL 2 TIMES DAILY
Qty: 30 TABLET | Refills: 0 | Status: CANCELLED | OUTPATIENT
Start: 2022-05-05 | End: 2022-05-20

## 2022-05-05 RX ORDER — GUAIFENESIN/DEXTROMETHORPHAN 100-10MG/5
5 SYRUP ORAL 3 TIMES DAILY PRN
Qty: 120 ML | Refills: 0 | Status: SHIPPED | OUTPATIENT
Start: 2022-05-05 | End: 2022-05-12

## 2022-05-05 ASSESSMENT — ENCOUNTER SYMPTOMS
CONSTIPATION: 0
VOMITING: 0
NAUSEA: 0
DIARRHEA: 0
ABDOMINAL PAIN: 0
COUGH: 1
SHORTNESS OF BREATH: 1
WHEEZING: 0

## 2022-05-05 NOTE — PROGRESS NOTES
[facility])  Family Medicine Outpatient        SUBJECTIVE:  CC: had concerns including Post-COVID Symptoms (Symptoms started 4/17 and was seen at Walk In 4/20, since testing positive pt still experiencing dizziness and headaches), Dizziness (Comes and goes, nothing makes it better or worse), Headache (Started experiencing headaches daily, waking up with headaches), and Cough (Cough with production, and SOB from coughing). HPI:  Cherie Singh is a female 61 y.o. presented to the clinic for to follow up after being diagnosed with covid on 4/20/22 at walk-in. Her symptoms at that time were a sore throat, productive cough with yellow sputum, generalized body aches, fatigue, and sob that started 4/17/22. She has a significant history of COPD and MAC. She was treated with Doxycyline, Decadron, and vitamin d. She was prescribed Paxlovid, but did not take it. On the last day of her antibiotic she felt some improvement. She has some chronic sob, cough, mucus production with episodic dizziness/headaches worsened. She has subsequently just been staying home and resting. She is using prn Tylenol. She was previously on medication for MAC, but declines ever going back on anything because it almost killed her. She states she feels \"congested\" in her lungs. She notes she is breathing better today. She states she is eating and drinking well. She notes a bronchoscopy helped her in the past.     Review of Systems   Constitutional: Positive for fatigue. Negative for appetite change and fever. Respiratory: Positive for cough and shortness of breath. Negative for wheezing. Cardiovascular: Negative for chest pain and palpitations. Gastrointestinal: Negative for abdominal pain, constipation, diarrhea, nausea and vomiting. Neurological: Positive for dizziness and headaches. Negative for tremors, seizures, syncope, light-headedness and numbness.        Outpatient Medications Marked as Taking for the 5/5/22 encounter (Office Visit) with Sol Arceo, MD   Medication Sig Dispense Refill    guaiFENesin-dextromethorphan (ROBITUSSIN DM) 100-10 MG/5ML syrup Take 5 mLs by mouth 3 times daily as needed for Cough 120 mL 0    predniSONE (DELTASONE) 20 MG tablet Take 1 tablet by mouth 2 times daily for 7 days 14 tablet 0    famotidine (PEPCID) 20 MG tablet TAKE 1 TABLET BY MOUTH TWICE A  tablet 1    albuterol sulfate  (90 Base) MCG/ACT inhaler Inhale 2 puffs into the lungs every 6 hours as needed for Wheezing 3 Inhaler 1    vilazodone HCl (VILAZODONE HCL) 40 MG TABS Take 40 mg by mouth daily         I have reviewed all pertinent PMHx, PSHx, FamHx, SocialHx, medications, and allergies and updated history as appropriate. OBJECTIVE    VS: /70   Pulse 74   Temp 98.4 °F (36.9 °C) (Temporal)   Resp 18   Ht 5' 2\" (1.575 m)   Wt 106 lb (48.1 kg)   LMP  (LMP Unknown)   SpO2 97%   Breastfeeding No   BMI 19.39 kg/m²   Physical Exam  Constitutional:       General: She is not in acute distress. Appearance: She is well-developed. She is not diaphoretic. HENT:      Head: Normocephalic and atraumatic. Right Ear: Ear canal and external ear normal.      Left Ear: Ear canal and external ear normal.      Ears:      Comments: Mild fluid behind b/l TM without any purulence, erythema, or bulging. Eyes:      Conjunctiva/sclera: Conjunctivae normal.      Pupils: Pupils are equal, round, and reactive to light. Cardiovascular:      Rate and Rhythm: Normal rate and regular rhythm. Pulmonary:      Effort: Pulmonary effort is normal.      Breath sounds: No stridor. No wheezing. Comments: Decreased breath sounds and crackles rll  Abdominal:      General: Bowel sounds are normal. There is no distension. Palpations: Abdomen is soft. Tenderness: There is no abdominal tenderness. Hernia: No hernia is present. Musculoskeletal:      Cervical back: Normal range of motion and neck supple.    Skin:     General: Skin is warm and dry. Neurological:      Mental Status: She is alert and oriented to person, place, and time. ASSESSMENT/PLAN:  1. History of COVID-19  Recommend baby Aspirin daily for 3m s/p Covid dx. - Comprehensive Metabolic Panel; Future  - Sedimentation Rate; Future  - [facility], Pul[facility], [place]  - guaiFENesin-dextromethorphan (ROBITUSSIN DM) 100-10 MG/5ML syrup; Take 5 mLs by mouth 3 times daily as needed for Cough  Dispense: 120 mL; Refill: 0  - CBC with Auto Differential; Future  - CT CHEST WO CONTRAST; Future  - predniSONE (DELTASONE) 20 MG tablet; Take 1 tablet by mouth 2 times daily for 7 days  Dispense: 14 tablet; Refill: 0    2. Pulmonary Mycobacterium avium complex (MAC) infection ([facility]  - [address] [place] [address] Carmenza Hurley DO, Pulmonary, [place]  - guaiFENesin-dextromethorphan (ROBITUSSIN DM) 100-10 MG/5ML syrup; Take 5 mLs by mouth 3 times daily as needed for Cough  Dispense: 120 mL; Refill: 0  - CT CHEST WO CONTRAST; Future    3. Chronic obstructive pulmonary disease, unspecified COPD type ([facility]  She reports using her Albuterol inhaler once every couple weeks. - [facility] Carmenza Hurley DO, Pulmonary, [place]  - guaiFENesin-dextromethorphan (ROBITUSSIN DM) 100-10 MG/5ML syrup; Take 5 mLs by mouth 3 times daily as needed for Cough  Dispense: 120 mL; Refill: 0  - CT CHEST WO CONTRAST; Future    4. Bronchiectasis without complication ([facility]  - [facility] Carmenza Hurley DO, Pulmonary, [place]  - guaiFENesin-dextromethorphan (ROBITUSSIN DM) 100-10 MG/5ML syrup; Take 5 mLs by mouth 3 times daily as needed for Cough  Dispense: 120 mL; Refill: 0  - CT CHEST WO CONTRAST; Future  - predniSONE (DELTASONE) 20 MG tablet; Take 1 tablet by mouth 2 times daily for 7 days  Dispense: 14 tablet; Refill: 0    5. Headaches  On top of her head with dull ache. Mild fluid on ear. Regimen as above. If persist return for reevaluation. Steroid burst prescribed for patient.  She is agreeable to follow with Pulm for her chronic conditions. She declines another consult with ID with her hx of MAC and states she wouldn't take any treatment. Stat CT ordered along with blood work. Patient to go to the hospital to get this done now. I have reviewed my findings and recommendations with Yoel Horn MD  5/11/2022 4:09 PM  Return in about 2 weeks (around 5/19/2022). Counseled regarding above diagnosis, including possible risks and complications, especially if left uncontrolled. Patient counseled on red flag symptoms and if they occur to go to the ED. Discussed medications risk/benefits and possible side effects and alternatives to treatment. Patient and/or guardian verbalizes understanding, agrees, feels comfortable with and wishes to proceed with above treatment plan. Advised patient regarding importance of keeping up with recommended health maintenance and to schedule as soon as possible if overdue, as this is important in assessing for undiagnosed pathology, especially cancer, as well as protecting against potentially harmful/life threatening disease. Patient and/or guardian verbalizes understanding and agrees with above counseling, assessment and plan. All questions answered. Please note this report has been partially produced using speech recognition software  and may contain errors related to that system including grammar, punctuation and spelling as well as words and phrases that may seem inappropriate. If there are questions or concerns please feel free to contact me to clarify.

## 2022-05-05 NOTE — TELEPHONE ENCOUNTER
Attempted to contact Dr. Naomi Viera office back to speak with Yahir Sequeira regarding referral received. No answer, unable to leave message to return call. Will re-attempt at a later time.

## 2022-05-09 ENCOUNTER — TELEPHONE (OUTPATIENT)
Dept: PULMONOLOGY | Age: 67
End: 2022-05-09

## 2022-05-09 DIAGNOSIS — J44.9 CHRONIC OBSTRUCTIVE PULMONARY DISEASE, UNSPECIFIED COPD TYPE (HCC): ICD-10-CM

## 2022-05-09 DIAGNOSIS — A31.0 PULMONARY MYCOBACTERIUM AVIUM COMPLEX (MAC) INFECTION (HCC): Primary | ICD-10-CM

## 2022-05-09 NOTE — TELEPHONE ENCOUNTER
Mailed a letter to patient informing her that her PFT is scheduled for 6-2-22 at 11:00 am at the [facility]. She must arrive by 10:30 am. She is to have no caffeine for 24 hours prior to test and no resp meds for at least 4 hours prior to test. She must also bring in proof of her COVID vaccines

## 2022-05-09 NOTE — TELEPHONE ENCOUNTER
Contacted pt regarding referral received by Dr. Sung Live. Informed the pt there is an opening for 06/08/2022 at 9:30 a.m. if this appointment works for her. Pt agreed to the appointment date and time. Provided clinic address and phone number at this time and informed her to contact the clinic if she needs to make any changes to her appointment date or time. Pt verbalized understanding and denied any questions or concerns at this time.

## 2022-05-25 ENCOUNTER — TELEPHONE (OUTPATIENT)
Dept: FAMILY MEDICINE CLINIC | Age: 67
End: 2022-05-25

## 2022-05-25 NOTE — TELEPHONE ENCOUNTER
Patient called in c/o shortness of breath and chest pain for the past 4 days. Patient states she feels like her chest is filling up with fluid. I advised patient to go to ED . Patient was amendable and states she will go today.

## 2022-06-02 ENCOUNTER — HOSPITAL ENCOUNTER (OUTPATIENT)
Dept: PULMONOLOGY | Age: 67
Discharge: HOME | End: 2022-06-02
Payer: MEDICARE

## 2022-06-02 DIAGNOSIS — J44.9 CHRONIC OBSTRUCTIVE PULMONARY DISEASE, UNSPECIFIED COPD TYPE (HCC): ICD-10-CM

## 2022-06-02 PROCEDURE — 94060 EVALUATION OF WHEEZING: CPT

## 2022-06-02 PROCEDURE — 94729 DIFFUSING CAPACITY: CPT | Performed by: INTERNAL MEDICINE

## 2022-06-02 PROCEDURE — 94726 PLETHYSMOGRAPHY LUNG VOLUMES: CPT

## 2022-06-02 PROCEDURE — 94060 EVALUATION OF WHEEZING: CPT | Performed by: INTERNAL MEDICINE

## 2022-06-02 PROCEDURE — 94726 PLETHYSMOGRAPHY LUNG VOLUMES: CPT | Performed by: INTERNAL MEDICINE

## 2022-06-02 PROCEDURE — 94729 DIFFUSING CAPACITY: CPT

## 2022-06-08 ENCOUNTER — OFFICE VISIT (OUTPATIENT)
Dept: PULMONOLOGY | Age: 67
End: 2022-06-08
Payer: MEDICARE

## 2022-06-08 ENCOUNTER — TELEPHONE (OUTPATIENT)
Dept: PULMONOLOGY | Age: 67
End: 2022-06-08

## 2022-06-08 VITALS
HEIGHT: 62 IN | RESPIRATION RATE: 18 BRPM | OXYGEN SATURATION: 95 % | WEIGHT: 107 LBS | DIASTOLIC BLOOD PRESSURE: 58 MMHG | TEMPERATURE: 93.5 F | HEART RATE: 77 BPM | BODY MASS INDEX: 19.69 KG/M2 | SYSTOLIC BLOOD PRESSURE: 106 MMHG

## 2022-06-08 DIAGNOSIS — Z87.891 HISTORY OF TOBACCO USE: ICD-10-CM

## 2022-06-08 DIAGNOSIS — J44.9 CHRONIC OBSTRUCTIVE PULMONARY DISEASE, UNSPECIFIED COPD TYPE (HCC): Primary | ICD-10-CM

## 2022-06-08 DIAGNOSIS — Z86.19 HISTORY OF MYCOBACTERIUM AVIUM COMPLEX INFECTION: ICD-10-CM

## 2022-06-08 DIAGNOSIS — J47.9 BRONCHIECTASIS WITHOUT COMPLICATION (HCC): ICD-10-CM

## 2022-06-08 DIAGNOSIS — Z01.818 PREOP TESTING: ICD-10-CM

## 2022-06-08 PROCEDURE — G8420 CALC BMI NORM PARAMETERS: HCPCS | Performed by: INTERNAL MEDICINE

## 2022-06-08 PROCEDURE — 99213 OFFICE O/P EST LOW 20 MIN: CPT

## 2022-06-08 PROCEDURE — 3017F COLORECTAL CA SCREEN DOC REV: CPT | Performed by: INTERNAL MEDICINE

## 2022-06-08 PROCEDURE — 3023F SPIROM DOC REV: CPT | Performed by: INTERNAL MEDICINE

## 2022-06-08 PROCEDURE — 99213 OFFICE O/P EST LOW 20 MIN: CPT | Performed by: INTERNAL MEDICINE

## 2022-06-08 PROCEDURE — 1036F TOBACCO NON-USER: CPT | Performed by: INTERNAL MEDICINE

## 2022-06-08 PROCEDURE — 99214 OFFICE O/P EST MOD 30 MIN: CPT | Performed by: INTERNAL MEDICINE

## 2022-06-08 PROCEDURE — G8427 DOCREV CUR MEDS BY ELIG CLIN: HCPCS | Performed by: INTERNAL MEDICINE

## 2022-06-08 NOTE — PROGRESS NOTES
Pt seen in clinic for post-covid symptoms. Pt reports she formerly saw Dr. Ashlyn Jackson but has not seen him in over 2 years. Pt reports she had covid over Easter and has been experiencing cough, SOB, and chills since being diagnosed and it is affecting her day to day routines as well as her sleeping. Pt reports she is to be wearing oxygen 2 LPM via NC at HS but is not compliant as she feels it exacerbates her coughing. Pt ordered a bronchoscopy procedure at this time. Pt is also to be ordered nebulizer vials to be sent to [address] as they are the ones who currently supply her nebulizer supplies and medications. The office will also attempt to order nutritional drinks for the pt as well. Pt has been ordered a covid test to be completed within 72 hours of Bronchoscopy procedure as she is not fully vaccinate for covid. Pt provided with flutter valve and educated on its use. Will follow up with the pt post-bronchoscopy but will schedule pt for a 3 month follow up in office as well.

## 2022-06-08 NOTE — PROGRESS NOTES
[address]   PRE-ADMISSION TESTING GENERAL INSTRUCTIONS  [facility] Phone Number: [phone]      GENERAL INSTRUCTIONS:    [x] Antibacterial Soap Shower Night before and/or AM of Surgery  [] Viral (CHG) wipe instruction sheet and wipes given. [] Hibiclens shower the night before and the morning of surgery. Do not use Hibiclens on your face or head. [x] Do not wear makeup, lotions, powders, deodorant. [x] Nothing by mouth after midnight, including gum, candy, mints, or water. [x] You may brush your teeth, gargle, but do NOT swallow water. [x] No tobacco products, illegal drugs, or alcohol within 24 hours of your surgery. [x] Jewelry or valuables should not be brought to the hospital. All body and/or tongue piercing's must be removed prior to arriving to hospital. ALL hair pins must be removed. [x] Arrange transportation with a responsible adult  to and from the hospital. Arrange for someone to be with you for the remainder of the day and for 24 hours after your procedure due to having had anesthesia. Who will be your  for transportation?__Sharda Perez______         Who will be staying with you for 24 hrs after your procedure?__________________  [x] Bring insurance card and photo ID.  [] Bring copy of living will or healthcare power of  papers to be placed in your electronic record. [] Transfusion Bracelet: Please bring with you to hospital, day of surgery  [] CPAP/BI-PAP: Please bring your machine if you are to spend the night in the hospital.     PARKING INSTRUCTIONS:     [x] ARRIVAL TIME:  1200  · [x] Enter into the [facility] [employer]. Two people may accompany you. Masks are required. · [x] Parking Lot \"I\" is where you will park. It is located on the corner of [address] and [address]. The entrance is on [address]. · To enter, press the button and the gate will lift. A free token will be provided to exit the lot.     EDUCATION INSTRUCTIONS:        [] Knee or hip replacement booklet & exercise pamphlets given. [] [address] placed in chart. [] Pre-admission Testing educational folder given  [] Incentive Spirometry,coughing & deep breathing exercises reviewed. [] Medication information sheet(s)   [] Fluoroscopy-Xray used in surgery reviewed with patient. Educational pamphlet placed in chart. [] Pain: Post-op pain is normal and to be expected. You will be asked to rate your pain from 0-10. [] Ask your nurse for your pain medication. [] Joint camp offered. [] Joint replacement booklets given.  [] Spine Navigator to see in PAT. [] Other:___________________________    MEDICATION INSTRUCTIONS:    [x] Bring a complete list of your medications, please write the last time you took the medicine, give this list to the nurse. [x] Take the following medications the morning of surgery with 1-2 ounces of water:  Albuterol if needed, vilazodone, Pepcid  [x] Stop herbal supplements and vitamins 5 days before your surgery. [] DO NOT take any diabetic medicine the morning of surgery. Follow instructions for insulin the day before surgery. [] If you are diabetic and your blood sugar is low or you feel symptomatic, you may drink 1-2 ounces of apple juice or take a glucose tablet.            -The morning of your procedure, you may call the pre-op area if you have concerns about your blood sugar [phone]. [x] Use your inhalers the morning of surgery. Bring your emergency inhaler with you day of surgery. [x] Follow physician instructions regarding any blood thinners you may be taking. WHAT TO EXPECT:    [x] The day of surgery you will be greeted and checked in by the [employer].  In addition, you will be registered in the [facility] by a Patient Access Representative. Please bring your photo ID and insurance card.  A nurse will greet you in accordance to the time you are needed in the pre-op area to prepare you for surgery. Please do not be discouraged if you are not greeted in the order you arrive as there are many variables that are involved in patient preparation. Your patience is greatly appreciated as you wait for your nurse. Please bring in items such as: books, magazines, newspapers, electronics, or any other items  to occupy your time in the waiting area. [x]  Delays may occur with surgery and staff will make a sincere effort to keep you informed of delays. If any delays occur with your procedure, we apologize ahead of time for your inconvenience as we recognize the value of your time.

## 2022-06-08 NOTE — TELEPHONE ENCOUNTER
Contacted pt and informed her that her Bronchoscopy has been scheduled for Tuesday June 14, 2022 at 2:00 p.m. Informed the pt that the hospital would contact her with further details and instructions. Pt was also informed a covid test has been ordered and she must completed that within 72 hours of the bronchoscopy procedure. Pt verbalized understanding and denied any questions or concerns at this time.

## 2022-06-09 ENCOUNTER — TELEPHONE (OUTPATIENT)
Dept: PULMONOLOGY | Age: 67
End: 2022-06-09

## 2022-06-09 NOTE — TELEPHONE ENCOUNTER
Pt contacted clinic to inquire what she needs to do after she gets covid tested. Informed the pt that depending on where she completes her testing she may not need to do anything. Informed the pt if she is tested at a [facility] facility she will not need to do anything as the results will be on file. Informed her if she is tested at a non [facility] facility she will need to have the results on hand day of her bronchoscopy procedure. Pt verbalized understanding and denied any questions or concerns at this time.

## 2022-06-10 DIAGNOSIS — Z01.818 PREOP TESTING: ICD-10-CM

## 2022-06-12 LAB — SARS-COV-2, PCR: NOT DETECTED

## 2022-06-14 ENCOUNTER — ANESTHESIA (OUTPATIENT)
Dept: ENDOSCOPY | Age: 67
End: 2022-06-14
Payer: MEDICARE

## 2022-06-14 ENCOUNTER — ANESTHESIA EVENT (OUTPATIENT)
Dept: ENDOSCOPY | Age: 67
End: 2022-06-14
Payer: MEDICARE

## 2022-06-14 ENCOUNTER — HOSPITAL ENCOUNTER (OUTPATIENT)
Age: 67
Setting detail: OBSERVATION
Discharge: HOME | End: 2022-06-15
Attending: INTERNAL MEDICINE | Admitting: INTERNAL MEDICINE
Payer: MEDICARE

## 2022-06-14 DIAGNOSIS — J44.9 OBSTRUCTIVE CHRONIC BRONCHITIS WITHOUT EXACERBATION (HCC): ICD-10-CM

## 2022-06-14 PROBLEM — R09.02 HYPOXEMIA: Status: ACTIVE | Noted: 2022-06-14

## 2022-06-14 PROCEDURE — 6370000000 HC RX 637 (ALT 250 FOR IP): Performed by: ANESTHESIOLOGY

## 2022-06-14 PROCEDURE — 2709999900 HC NON-CHARGEABLE SUPPLY: Performed by: INTERNAL MEDICINE

## 2022-06-14 PROCEDURE — 6370000000 HC RX 637 (ALT 250 FOR IP)

## 2022-06-14 PROCEDURE — 2580000003 HC RX 258: Performed by: INTERNAL MEDICINE

## 2022-06-14 PROCEDURE — 87158 CULTURE TYPING ADDED METHOD: CPT

## 2022-06-14 PROCEDURE — 87077 CULTURE AEROBIC IDENTIFY: CPT

## 2022-06-14 PROCEDURE — 87116 MYCOBACTERIA CULTURE: CPT

## 2022-06-14 PROCEDURE — 6360000002 HC RX W HCPCS: Performed by: ANESTHESIOLOGY

## 2022-06-14 PROCEDURE — 87186 SC STD MICRODIL/AGAR DIL: CPT

## 2022-06-14 PROCEDURE — 87205 SMEAR GRAM STAIN: CPT

## 2022-06-14 PROCEDURE — G0378 HOSPITAL OBSERVATION PER HR: HCPCS

## 2022-06-14 PROCEDURE — 6370000000 HC RX 637 (ALT 250 FOR IP): Performed by: INTERNAL MEDICINE

## 2022-06-14 PROCEDURE — 87281 PNEUMOCYSTIS CARINII AG IF: CPT

## 2022-06-14 PROCEDURE — 3609027000 HC BRONCHOSCOPY: Performed by: INTERNAL MEDICINE

## 2022-06-14 PROCEDURE — 2500000003 HC RX 250 WO HCPCS: Performed by: INTERNAL MEDICINE

## 2022-06-14 PROCEDURE — 7100000011 HC PHASE II RECOVERY - ADDTL 15 MIN: Performed by: INTERNAL MEDICINE

## 2022-06-14 PROCEDURE — 89051 BODY FLUID CELL COUNT: CPT

## 2022-06-14 PROCEDURE — 3700000000 HC ANESTHESIA ATTENDED CARE: Performed by: INTERNAL MEDICINE

## 2022-06-14 PROCEDURE — 87070 CULTURE OTHR SPECIMN AEROBIC: CPT

## 2022-06-14 PROCEDURE — 87015 SPECIMEN INFECT AGNT CONCNTJ: CPT

## 2022-06-14 PROCEDURE — 99232 SBSQ HOSP IP/OBS MODERATE 35: CPT | Performed by: INTERNAL MEDICINE

## 2022-06-14 PROCEDURE — 6360000002 HC RX W HCPCS: Performed by: INTERNAL MEDICINE

## 2022-06-14 PROCEDURE — 87305 ASPERGILLUS AG IA: CPT

## 2022-06-14 PROCEDURE — 87102 FUNGUS ISOLATION CULTURE: CPT

## 2022-06-14 PROCEDURE — 2700000000 HC OXYGEN THERAPY PER DAY

## 2022-06-14 PROCEDURE — 6360000002 HC RX W HCPCS: Performed by: NURSE ANESTHETIST, CERTIFIED REGISTERED

## 2022-06-14 PROCEDURE — 3700000001 HC ADD 15 MINUTES (ANESTHESIA): Performed by: INTERNAL MEDICINE

## 2022-06-14 PROCEDURE — 94640 AIRWAY INHALATION TREATMENT: CPT

## 2022-06-14 PROCEDURE — 87206 SMEAR FLUORESCENT/ACID STAI: CPT

## 2022-06-14 PROCEDURE — 7100000010 HC PHASE II RECOVERY - FIRST 15 MIN: Performed by: INTERNAL MEDICINE

## 2022-06-14 RX ORDER — ACETAMINOPHEN 325 MG/1
650 TABLET ORAL EVERY 6 HOURS PRN
Status: DISCONTINUED | OUTPATIENT
Start: 2022-06-14 | End: 2022-06-14 | Stop reason: SDUPTHER

## 2022-06-14 RX ORDER — LIDOCAINE HYDROCHLORIDE 10 MG/ML
INJECTION, SOLUTION EPIDURAL; INFILTRATION; INTRACAUDAL; PERINEURAL PRN
Status: DISCONTINUED | OUTPATIENT
Start: 2022-06-14 | End: 2022-06-14 | Stop reason: ALTCHOICE

## 2022-06-14 RX ORDER — VILAZODONE HYDROCHLORIDE 40 MG/1
40 TABLET ORAL DAILY
Status: DISCONTINUED | OUTPATIENT
Start: 2022-06-15 | End: 2022-06-15 | Stop reason: HOSPADM

## 2022-06-14 RX ORDER — SODIUM CHLORIDE 0.9 % (FLUSH) 0.9 %
5-40 SYRINGE (ML) INJECTION EVERY 12 HOURS SCHEDULED
Status: DISCONTINUED | OUTPATIENT
Start: 2022-06-14 | End: 2022-06-15 | Stop reason: HOSPADM

## 2022-06-14 RX ORDER — ALBUTEROL SULFATE 2.5 MG/3ML
2.5 SOLUTION RESPIRATORY (INHALATION) EVERY 6 HOURS PRN
Status: DISCONTINUED | OUTPATIENT
Start: 2022-06-14 | End: 2022-06-15 | Stop reason: HOSPADM

## 2022-06-14 RX ORDER — LIDOCAINE HYDROCHLORIDE 20 MG/ML
INJECTION, SOLUTION INTRAVENOUS PRN
Status: DISCONTINUED | OUTPATIENT
Start: 2022-06-14 | End: 2022-06-14 | Stop reason: SDUPTHER

## 2022-06-14 RX ORDER — SODIUM CHLORIDE 9 MG/ML
INJECTION, SOLUTION INTRAVENOUS PRN
Status: DISCONTINUED | OUTPATIENT
Start: 2022-06-14 | End: 2022-06-14 | Stop reason: HOSPADM

## 2022-06-14 RX ORDER — ACETAMINOPHEN 650 MG/1
650 SUPPOSITORY RECTAL EVERY 6 HOURS PRN
Status: DISCONTINUED | OUTPATIENT
Start: 2022-06-14 | End: 2022-06-15 | Stop reason: HOSPADM

## 2022-06-14 RX ORDER — POLYETHYLENE GLYCOL 3350 17 G/17G
17 POWDER, FOR SOLUTION ORAL DAILY PRN
Status: DISCONTINUED | OUTPATIENT
Start: 2022-06-14 | End: 2022-06-15 | Stop reason: HOSPADM

## 2022-06-14 RX ORDER — FAMOTIDINE 20 MG/1
20 TABLET, FILM COATED ORAL DAILY
Status: DISCONTINUED | OUTPATIENT
Start: 2022-06-14 | End: 2022-06-15 | Stop reason: HOSPADM

## 2022-06-14 RX ORDER — IPRATROPIUM BROMIDE AND ALBUTEROL SULFATE 2.5; .5 MG/3ML; MG/3ML
1 SOLUTION RESPIRATORY (INHALATION)
Status: DISCONTINUED | OUTPATIENT
Start: 2022-06-14 | End: 2022-06-15 | Stop reason: HOSPADM

## 2022-06-14 RX ORDER — ONDANSETRON 2 MG/ML
4 INJECTION INTRAMUSCULAR; INTRAVENOUS
Status: COMPLETED | OUTPATIENT
Start: 2022-06-14 | End: 2022-06-14

## 2022-06-14 RX ORDER — ACETAMINOPHEN 325 MG/1
650 TABLET ORAL EVERY 6 HOURS PRN
Status: DISCONTINUED | OUTPATIENT
Start: 2022-06-14 | End: 2022-06-15 | Stop reason: HOSPADM

## 2022-06-14 RX ORDER — ACETAMINOPHEN 325 MG/1
TABLET ORAL
Status: COMPLETED
Start: 2022-06-14 | End: 2022-06-14

## 2022-06-14 RX ORDER — SODIUM CHLORIDE 0.9 % (FLUSH) 0.9 %
5-40 SYRINGE (ML) INJECTION PRN
Status: DISCONTINUED | OUTPATIENT
Start: 2022-06-14 | End: 2022-06-14 | Stop reason: SDUPTHER

## 2022-06-14 RX ORDER — SODIUM CHLORIDE 9 MG/ML
INJECTION, SOLUTION INTRAVENOUS PRN
Status: DISCONTINUED | OUTPATIENT
Start: 2022-06-14 | End: 2022-06-14 | Stop reason: SDUPTHER

## 2022-06-14 RX ORDER — ONDANSETRON 4 MG/1
4 TABLET, ORALLY DISINTEGRATING ORAL EVERY 8 HOURS PRN
Status: DISCONTINUED | OUTPATIENT
Start: 2022-06-14 | End: 2022-06-15 | Stop reason: HOSPADM

## 2022-06-14 RX ORDER — SODIUM CHLORIDE 9 MG/ML
INJECTION, SOLUTION INTRAVENOUS PRN
Status: DISCONTINUED | OUTPATIENT
Start: 2022-06-14 | End: 2022-06-15 | Stop reason: HOSPADM

## 2022-06-14 RX ORDER — POLYETHYLENE GLYCOL 3350 17 G/17G
17 POWDER, FOR SOLUTION ORAL DAILY PRN
Status: DISCONTINUED | OUTPATIENT
Start: 2022-06-14 | End: 2022-06-14

## 2022-06-14 RX ORDER — ACETAMINOPHEN 650 MG/1
650 SUPPOSITORY RECTAL EVERY 6 HOURS PRN
Status: DISCONTINUED | OUTPATIENT
Start: 2022-06-14 | End: 2022-06-14 | Stop reason: SDUPTHER

## 2022-06-14 RX ORDER — SODIUM CHLORIDE 0.9 % (FLUSH) 0.9 %
5-40 SYRINGE (ML) INJECTION EVERY 12 HOURS SCHEDULED
Status: DISCONTINUED | OUTPATIENT
Start: 2022-06-14 | End: 2022-06-14 | Stop reason: SDUPTHER

## 2022-06-14 RX ORDER — IPRATROPIUM BROMIDE AND ALBUTEROL SULFATE 2.5; .5 MG/3ML; MG/3ML
3 SOLUTION RESPIRATORY (INHALATION)
Status: DISCONTINUED | OUTPATIENT
Start: 2022-06-14 | End: 2022-06-14

## 2022-06-14 RX ORDER — ONDANSETRON 2 MG/ML
4 INJECTION INTRAMUSCULAR; INTRAVENOUS EVERY 6 HOURS PRN
Status: DISCONTINUED | OUTPATIENT
Start: 2022-06-14 | End: 2022-06-15 | Stop reason: HOSPADM

## 2022-06-14 RX ORDER — ONDANSETRON 4 MG/1
4 TABLET, ORALLY DISINTEGRATING ORAL EVERY 8 HOURS PRN
Status: DISCONTINUED | OUTPATIENT
Start: 2022-06-14 | End: 2022-06-14

## 2022-06-14 RX ORDER — IPRATROPIUM BROMIDE AND ALBUTEROL SULFATE 2.5; .5 MG/3ML; MG/3ML
1 SOLUTION RESPIRATORY (INHALATION)
Status: COMPLETED | OUTPATIENT
Start: 2022-06-14 | End: 2022-06-14

## 2022-06-14 RX ORDER — SODIUM CHLORIDE 0.9 % (FLUSH) 0.9 %
5-40 SYRINGE (ML) INJECTION PRN
Status: DISCONTINUED | OUTPATIENT
Start: 2022-06-14 | End: 2022-06-15 | Stop reason: HOSPADM

## 2022-06-14 RX ORDER — ONDANSETRON 2 MG/ML
4 INJECTION INTRAMUSCULAR; INTRAVENOUS EVERY 6 HOURS PRN
Status: DISCONTINUED | OUTPATIENT
Start: 2022-06-14 | End: 2022-06-14

## 2022-06-14 RX ORDER — PROPOFOL 10 MG/ML
INJECTION, EMULSION INTRAVENOUS PRN
Status: DISCONTINUED | OUTPATIENT
Start: 2022-06-14 | End: 2022-06-14 | Stop reason: SDUPTHER

## 2022-06-14 RX ORDER — SODIUM CHLORIDE 0.9 % (FLUSH) 0.9 %
5-40 SYRINGE (ML) INJECTION EVERY 12 HOURS SCHEDULED
Status: DISCONTINUED | OUTPATIENT
Start: 2022-06-14 | End: 2022-06-14 | Stop reason: HOSPADM

## 2022-06-14 RX ORDER — ACETAMINOPHEN 325 MG/1
650 TABLET ORAL
Status: COMPLETED | OUTPATIENT
Start: 2022-06-14 | End: 2022-06-14

## 2022-06-14 RX ORDER — SODIUM CHLORIDE 0.9 % (FLUSH) 0.9 %
5-40 SYRINGE (ML) INJECTION PRN
Status: DISCONTINUED | OUTPATIENT
Start: 2022-06-14 | End: 2022-06-14 | Stop reason: HOSPADM

## 2022-06-14 RX ADMIN — LIDOCAINE HYDROCHLORIDE 40 MG: 20 INJECTION, SOLUTION INTRAVENOUS at 14:20

## 2022-06-14 RX ADMIN — IPRATROPIUM BROMIDE AND ALBUTEROL SULFATE 1 AMPULE: .5; 2.5 SOLUTION RESPIRATORY (INHALATION) at 19:05

## 2022-06-14 RX ADMIN — IPRATROPIUM BROMIDE AND ALBUTEROL SULFATE 1 AMPULE: 2.5; .5 SOLUTION RESPIRATORY (INHALATION) at 15:34

## 2022-06-14 RX ADMIN — ACETAMINOPHEN 650 MG: 325 TABLET ORAL at 21:18

## 2022-06-14 RX ADMIN — ONDANSETRON 4 MG: 2 INJECTION INTRAMUSCULAR; INTRAVENOUS at 21:18

## 2022-06-14 RX ADMIN — ACETAMINOPHEN 650 MG: 325 TABLET ORAL at 15:08

## 2022-06-14 RX ADMIN — PROPOFOL 100 MG: 10 INJECTION, EMULSION INTRAVENOUS at 14:20

## 2022-06-14 RX ADMIN — SODIUM CHLORIDE: 9 INJECTION, SOLUTION INTRAVENOUS at 14:11

## 2022-06-14 RX ADMIN — ONDANSETRON 4 MG: 2 INJECTION INTRAMUSCULAR; INTRAVENOUS at 16:09

## 2022-06-14 RX ADMIN — FAMOTIDINE 20 MG: 20 TABLET ORAL at 18:20

## 2022-06-14 RX ADMIN — SODIUM CHLORIDE, PRESERVATIVE FREE 10 ML: 5 INJECTION INTRAVENOUS at 21:18

## 2022-06-14 ASSESSMENT — PAIN DESCRIPTION - DIRECTION: RADIATING_TOWARDS: HEADACHE

## 2022-06-14 ASSESSMENT — PAIN DESCRIPTION - ONSET
ONSET: ON-GOING

## 2022-06-14 ASSESSMENT — PAIN DESCRIPTION - PAIN TYPE
TYPE: ACUTE PAIN
TYPE: ACUTE PAIN

## 2022-06-14 ASSESSMENT — PAIN DESCRIPTION - DESCRIPTORS
DESCRIPTORS: ACHING
DESCRIPTORS: ACHING
DESCRIPTORS: ACHING;DISCOMFORT;PRESSURE

## 2022-06-14 ASSESSMENT — PAIN DESCRIPTION - FREQUENCY
FREQUENCY: CONTINUOUS
FREQUENCY: CONTINUOUS

## 2022-06-14 ASSESSMENT — PAIN SCALES - GENERAL
PAINLEVEL_OUTOF10: 2
PAINLEVEL_OUTOF10: 6
PAINLEVEL_OUTOF10: 0
PAINLEVEL_OUTOF10: 3
PAINLEVEL_OUTOF10: 5
PAINLEVEL_OUTOF10: 6

## 2022-06-14 ASSESSMENT — PAIN - FUNCTIONAL ASSESSMENT
PAIN_FUNCTIONAL_ASSESSMENT: ACTIVITIES ARE NOT PREVENTED
PAIN_FUNCTIONAL_ASSESSMENT: 0-10

## 2022-06-14 ASSESSMENT — PAIN DESCRIPTION - LOCATION
LOCATION: HEAD

## 2022-06-14 NOTE — PROGRESS NOTES
This patient is on medication that requires renal, weight, and/or indication dose adjustment. Date Body Weight IBW  Adjusted BW SCr  CrCl Dialysis status   6/14/2022 106 lb (48.1 kg) Ideal body weight: 50.1 kg (110 lb 7.2 oz) Serum creatinine: 1 mg/dL 05/05/22 1310  Estimated creatinine clearance: 43 mL/min        Pharmacy has dose-adjusted the following medication(s):    Date Previous Order Adjusted Order   6/14/2022 Pepcid 20 mg PO BID Pepcid 20 mg PO daily     These changes were made per protocol according to the [address] for Pharmacists. *Please note this dose may need readjusted if patient's condition changes. Please contact pharmacy with any questions regarding these changes.     Ari Porter, [facility]  6/14/2022  6:04 PM

## 2022-06-14 NOTE — ANESTHESIA POSTPROCEDURE EVALUATION
Department of Anesthesiology  Postprocedure Note    Patient: Victor Manuel Orona  MRN: [id number]  YOB: 1955  Date of evaluation: 6/14/2022  Time:  7:27 PM     Procedure Summary     Date: 06/14/22 Room / Location: [facility] 03 / [facility]    Anesthesia Start: 7152 Anesthesia Stop: 3890    Procedure: BRONCHOSCOPY DIAGNOSTIC OR [facility] ONLY-BAL (N/A ) Diagnosis:       Obstructive chronic bronchitis without exacerbation ([facility] [address]      (CHRONIC BRONCHITIS)    Surgeons: Eboni Cowan DO Responsible Provider: Simon Barth DO    Anesthesia Type: MAC ASA Status: 3          Anesthesia Type: No value filed. Mary Beth Phase I: Mary Beth Score: 10    Mary Beth Phase II: Mary Beth Score: 9    Last vitals: Reviewed and per EMR flowsheets.        Anesthesia Post Evaluation    Patient location during evaluation: PACU  Patient participation: complete - patient participated  Level of consciousness: awake and alert  Pain score: 1  Airway patency: patent  Nausea & Vomiting: no nausea and no vomiting  Complications: no  Cardiovascular status: hemodynamically stable  Respiratory status: acceptable  Hydration status: euvolemic

## 2022-06-14 NOTE — PROGRESS NOTES
Messaged Dr. Sumaya Aguiar re: did she want an xray post op   She responded   -No chest xray post op   -Awaiting dc order

## 2022-06-14 NOTE — H&P
[place]  Department of Internal Medicine  Division of Pulmonary, Critical Care and Sleep Medicine  Consult Note    Lashon Branch DO, MPH, FCCP, FACOI, FACP  Kati Harris DO, [facility]      Patient: Hernesto Baltazar  MRN: [id number]  : 1955    Encounter Time: 6:30 PM     Date of Admission: 2022 12:11 PM    Primary Care Physician: Mignon Felix MD    Reason for Admission: Hypoxemia post bronchoscopy      HISTORY OF PRESENT ILLNESS : Hernesto Baltazar 61 y.o. female was seen in consultation regarding the above chief compliant. Patient with significant history of MAC. She has been having increased coughing, wheezing and shortness of breath and was seen in my office last week. Today she underwent bronchoscopy and unfortunately her O2 saturation was only 87% post procedure on room air. She does have oxygen at home with a concentrator however does not have any portable oxygen available. PAST MEDICAL HISTORY:  has a past medical history of Bronchiectasis ([facility] [address] COPD (chronic obstructive pulmonary disease) ([facility] [address] Depression, Hx of blood clots, and Mycobacterium avium complex ([facility] SURGICAL HISTORY:  has a past surgical history that includes bronchoscopy (N/A, 2019). SOCIAL HISTORY:  reports that she quit smoking about 24 years ago. Her smoking use included cigarettes. She started smoking about 45 years ago. She has a 20.00 pack-year smoking history. She has never used smokeless tobacco. She reports that she does not drink alcohol and does not use drugs. FAMILY  HISTORY: family history includes No Known Problems in her father and mother. MEDICATIONS:    Prior to Admission medications    Medication Sig Start Date End Date Taking?  Authorizing Provider   famotidine (PEPCID) 20 MG tablet TAKE 1 TABLET BY MOUTH TWICE A DAY 21   Mignon Felix MD   albuterol sulfate  (90 Base) MCG/ACT inhaler Inhale 2 puffs into the lungs every 6 hours as needed for Wheezing 5/27/21   [place] MD Dawson   vilazodone HCl (VILAZODONE HCL) 40 MG TABS Take 40 mg by mouth daily    Historical Provider, MD   ipratropium-albuterol (DUONEB) 0.5-2.5 (3) MG/3ML SOLN nebulizer solution Inhale 3 mLs into the lungs every 4 hours (while awake) . 1/1/20   Juanjoarden Alberto,        ALLERGIES: Rifampin       REVIEW OF SYSTEMS:  Otherwise negative if not reported or listed below  Constitutional:  Positive for unanticipated weight     No change in sleep pattern or activity. Positive for night sweats and chills  Eyes:    No visual changes or diplopia. No scleral icterus. ENT:    No Headaches, hearing loss or vertigo. No mouth sores or sore throat. Cardiovascular:  No chest discomfort, palpitations. Respiratory:  Positive for coughing, wheezing, shortness of breath  Gastrointestinal:  No abdominal pain, appetite loss, blood in stools. No hematemesis  Genitourinary:  No dysuria, trouble voiding or hematuria. No nocturia. Musculoskeletal:   No weakness or joint complaints. Integumentary: No rashes or pruritis. Neurological:  No headache, numbness or tingling. Psychiatric:   No effect on mood, memory, mentation, or behavior. No anxiety or depression. Endocrine:   No excessive thirst, fluid intake, or urination. No tremor. Hematologic: No abnormal bruising or bleeding. Lymphatic:  No swollen lymph nodes. Immunologic:  No hives or hx of anaphasics.       OBJECTIVE:     PHYSICAL EXAM:   VITALS:   Vitals:    06/14/22 1645 06/14/22 1700 06/14/22 1715 06/14/22 1745   BP: (!) 115/57 (!) 113/58 126/73 124/67   Pulse:   89 85   Resp:   18 18   Temp:   98.1 °F (36.7 °C) 97.7 °F (36.5 °C)   TempSrc:   Temporal Oral   SpO2: 100% 98% 94%    Weight:       Height:            Intake/Output Summary (Last 24 hours) at 6/14/2022 1830  Last data filed at 6/14/2022 1434  Gross per 24 hour   Intake 500 ml   Output --   Net 500 ml        CONSTITUTIONAL: A&O x 3, NAD. Thin  SKIN:     No rash, No suspicious lesions, No skin discoloration  HEENT:     EOMI, MMM, No thrush  NECK:    No bruits, no JVD  CV:      Sinus,  No murmur, No rubs, No gallops  PULMONARY:   Harsh cough. Few scattered expiratory wheezes throughout  ABDOMEN:     Soft, non-tender. BS normal. No R/R/G  EXT:    No deformities . No clubbing. No lower extremity edema, No venous stasis  PULSE:   Appears equal and palpable. PSYCHIATRIC:  Seems appropriate, No acute psychosis  MS:    No fractures, No gross weakness  NEUROLOGIC:   The clinical assessment is non-focal     DATA: IMAGING & TESTING:     LABORATORY TESTS:    CBC with Differential:    Lab Results   Component Value Date    WBC 9.0 05/05/2022    RBC 4.27 05/05/2022    HGB 12.6 05/05/2022    HCT 41.1 05/05/2022     05/05/2022    MCV 96.3 05/05/2022    MCH 29.5 05/05/2022    MCHC 30.7 05/05/2022    RDW 13.3 05/05/2022    LYMPHOPCT 15.4 05/05/2022    MONOPCT 8.2 05/05/2022    BASOPCT 0.7 05/05/2022    MONOSABS 0.74 05/05/2022    LYMPHSABS 1.38 05/05/2022    EOSABS 0.18 05/05/2022    BASOSABS 0.06 05/05/2022     CMP:    Lab Results   Component Value Date     05/05/2022    K 4.3 05/05/2022    K 4.0 01/06/2021    CL 99 05/05/2022    CO2 31 05/05/2022    BUN 23 05/05/2022    CREATININE 1.0 05/05/2022    GFRAA >60 05/05/2022    LABGLOM 56 05/05/2022    GLUCOSE 87 05/05/2022    PROT 7.6 05/05/2022    LABALBU 4.1 05/05/2022    CALCIUM 9.3 05/05/2022    BILITOT <0.2 05/05/2022    ALKPHOS 76 05/05/2022    AST 22 05/05/2022    ALT 14 05/05/2022        PRO-BNP:   Lab Results   Component Value Date    PROBNP 878 (H) 06/06/2020    PROBNP 465 (H) 12/28/2019      ABGs: No results found for: PH, PO2, PCO2  Hemoglobin A1C: No components found for: HGBA1C    IMAGING:  Imaging tests were completed and reviewed and discussed radiology and care team involved and reveals   No results found. Assessment:   1. Acute hypoxemia  2.  S/P Bronchoscopy  3. History of Mycobacterium Avium      Plan:   1. Wean FIO2 as tolerated  2. Continue duonebs and albuterol prn  3. Flutter valve ordered. 4. Follow up respiratory cultures.          Milford Litter, DO   Pulmonary, Critical Care and Sleep Medicine

## 2022-06-14 NOTE — ANESTHESIA PRE PROCEDURE
Department of Anesthesiology  Preprocedure Note       Name:  Norma Ellis   Age:  61 y.o.  :  1955                                          MRN:  [id number]         Date:  2022      Surgeon: Maria Luz Cao): Everardo Harris DO    Procedure: BRONCHOSCOPY DIAGNOSTIC OR [facility] ONLY-BAL (N/A )    Medications prior to admission:   Prior to Admission medications    Medication Sig Start Date End Date Taking? Authorizing Provider   famotidine (PEPCID) 20 MG tablet TAKE 1 TABLET BY MOUTH TWICE A DAY 21   Juan David Marshall MD   albuterol sulfate  (90 Base) MCG/ACT inhaler Inhale 2 puffs into the lungs every 6 hours as needed for Wheezing 21   Juan David Marshall MD   vilazodone HCl (VILAZODONE HCL) 40 MG TABS Take 40 mg by mouth daily    Historical Provider, MD   ipratropium-albuterol (DUONEB) 0.5-2.5 (3) MG/3ML SOLN nebulizer solution Inhale 3 mLs into the lungs every 4 hours (while awake) . 20   Juanjo Alberto DO       Current medications:    Current Facility-Administered Medications   Medication Dose Route Frequency Provider Last Rate Last Admin    sodium chloride flush 0.9 % injection 5-40 mL  5-40 mL IntraVENous 2 times per day Kati Harris DO        sodium chloride flush 0.9 % injection 5-40 mL  5-40 mL IntraVENous PRN Kati Harris DO        0.9 % sodium chloride infusion   IntraVENous PRN Everardo Harris DO           Allergies:     Allergies   Allergen Reactions    Rifampin Other (See Comments)     States this medicine caused Renal Failure       Problem List:    Patient Active Problem List   Diagnosis Code    Organizing pneumonia ([address] J84.89    Mycobacterium avium complex ([address] A31.0    Bronchiectasis ([address] J47.9    COPD (chronic obstructive pulmonary disease) ([address] J44.9    Pneumonia due to organism J18.9    Hyperglycemia R73.9    Mycobacteriosis A31.9    Acute renal failure (ARF) ([address] N17.9    Intractable nausea and vomiting R11.2    Hyperkalemia E87.5    Acidosis E87.2    Leukocytosis D72.829    Vertigo R42    Intractable vomiting R11.10    Pulmonary Mycobacterium avium complex (MAC) infection ([facility]) A31.0    Nausea R11.0    Seasonal allergies J30.2    Gastroesophageal reflux disease without esophagitis K21.9    Sepsis ([facility]) A41.9    Acute on chronic respiratory failure with hypoxia ([facility]) J96.21    Septic shock due to methicillin resistant Staphylococcus aureus ([facility]) A41.02, R65.21    Septic shock (HCC) A41.9, R65.21    Hx of Mycobacterium avium complex infection Z86.19    Septic embolism ([facility]) Angelica       Past Medical History:        Diagnosis Date    Bronchiectasis ([facility] [address]     COPD (chronic obstructive pulmonary disease) ([facility])     Depression     Hx of blood clots     lung    Mycobacterium avium complex ([facility] [address]        Past Surgical History:        Procedure Laterality Date    BRONCHOSCOPY N/A 2019    BRONCHOSCOPY ALVEOLAR LAVAGE performed by Yossi Parker MD at [facility]       Social History:    Social History     Tobacco Use    Smoking status: Former Smoker     Packs/day: 1.00     Years: 20.00     Pack years: 20.00     Types: Cigarettes     Start date: 1976     Quit date: 1997     Years since quittin.4    Smokeless tobacco: Never Used   Substance Use Topics    Alcohol use:  No                                Counseling given: Not Answered      Vital Signs (Current):   Vitals:    22 1311 22 1225   BP:  (!) 113/59   Pulse:  77   Resp:  16   Temp:  99.3 °F (37.4 °C)   TempSrc:  Temporal   SpO2:  96%   Weight: 106 lb (48.1 kg) 106 lb (48.1 kg)   Height: 5' 2\" (1.575 m) 5' 2\" (1.575 m)                                              BP Readings from Last 3 Encounters:   22 (!) 113/59   22 (!) 106/58   22 102/70       NPO Status: Time of last liquid consumption: 0900                        Time of last solid consumption: 2359                        Date of last liquid consumption: 22 Date of last solid food consumption: 06/13/22    BMI:   Wt Readings from Last 3 Encounters:   06/14/22 106 lb (48.1 kg)   06/08/22 107 lb (48.5 kg)   05/05/22 106 lb (48.1 kg)     Body mass index is 19.39 kg/m². CBC:   Lab Results   Component Value Date    WBC 9.0 05/05/2022    RBC 4.27 05/05/2022    HGB 12.6 05/05/2022    HCT 41.1 05/05/2022    MCV 96.3 05/05/2022    RDW 13.3 05/05/2022     05/05/2022       CMP:   Lab Results   Component Value Date     05/05/2022    K 4.3 05/05/2022    K 4.0 01/06/2021    CL 99 05/05/2022    CO2 31 05/05/2022    BUN 23 05/05/2022    CREATININE 1.0 05/05/2022    GFRAA >60 05/05/2022    LABGLOM 56 05/05/2022    GLUCOSE 87 05/05/2022    PROT 7.6 05/05/2022    CALCIUM 9.3 05/05/2022    BILITOT <0.2 05/05/2022    ALKPHOS 76 05/05/2022    AST 22 05/05/2022    ALT 14 05/05/2022       POC Tests: No results for input(s): POCGLU, POCNA, POCK, POCCL, POCBUN, POCHEMO, POCHCT in the last 72 hours.     Coags:   Lab Results   Component Value Date    PROTIME 12.6 12/28/2019    INR 1.1 12/28/2019    APTT 34.5 12/28/2019       HCG (If Applicable): No results found for: PREGTESTUR, PREGSERUM, HCG, HCGQUANT     ABGs: No results found for: PHART, PO2ART, TSH8MTZ, JEP6QVE, BEART, N7EUMAZC     Type & Screen (If Applicable):  No results found for: [facility]    Anesthesia Evaluation  Patient summary reviewed and Nursing notes reviewed no history of anesthetic complications:   Airway: Mallampati: II  TM distance: >3 FB   Neck ROM: full  Mouth opening: > = 3 FB   Dental:    (+) partials  Comment: Upper partial     Pulmonary:   (+) pneumonia:  COPD:  decreased breath sounds                           ROS comment: Bronchiectasis  Chronic bronchitis    Cardiovascular:Negative CV ROS            Rhythm: regular  Rate: normal                    Neuro/Psych:   Negative Neuro/Psych ROS  (+) depression/anxiety    (-) psychiatric history           GI/Hepatic/Renal: Neg GI/Hepatic/Renal ROS       (-) GERD       Endo/Other: Negative Endo/Other ROS                    Abdominal:             Vascular: negative vascular ROS. Other Findings:             Anesthesia Plan      MAC     ASA 3       Induction: intravenous. MIPS: Prophylactic antiemetics administered. Anesthetic plan and risks discussed with patient. Plan discussed with CRNA. Skylar Schwarz,    6/14/2022    Patient seen and examined, chart reviewed, agree with above findings. Anesthetic plan, risks, benefits, alternatives, and personnel involved discussed with patient and her daughter. Patient verbalized an understanding and agreed to proceed. NPO status confirmed. Anesthetic plan discussed with care team members and agreed upon.     Rosi Castillo DO   6/14/2022  1:52 PM

## 2022-06-15 ENCOUNTER — APPOINTMENT (OUTPATIENT)
Dept: GENERAL RADIOLOGY | Age: 67
End: 2022-06-15
Attending: INTERNAL MEDICINE
Payer: MEDICARE

## 2022-06-15 VITALS
OXYGEN SATURATION: 90 % | WEIGHT: 106 LBS | SYSTOLIC BLOOD PRESSURE: 99 MMHG | TEMPERATURE: 98.6 F | HEIGHT: 62 IN | BODY MASS INDEX: 19.51 KG/M2 | HEART RATE: 86 BPM | RESPIRATION RATE: 22 BRPM | DIASTOLIC BLOOD PRESSURE: 59 MMHG

## 2022-06-15 PROBLEM — R09.02 HYPOXEMIA: Status: RESOLVED | Noted: 2022-06-14 | Resolved: 2022-06-15

## 2022-06-15 LAB
ALBUMIN SERPL-MCNC: 3.1 G/DL (ref 3.5–5.2)
ALP BLD-CCNC: 56 U/L (ref 35–104)
ALT SERPL-CCNC: 9 U/L (ref 0–32)
ANION GAP SERPL CALCULATED.3IONS-SCNC: 9 MMOL/L (ref 7–16)
AST SERPL-CCNC: 16 U/L (ref 0–31)
BASOPHILS ABSOLUTE: 0.06 E9/L (ref 0–0.2)
BASOPHILS RELATIVE PERCENT: 0.5 % (ref 0–2)
BILIRUB SERPL-MCNC: 0.3 MG/DL (ref 0–1.2)
BUN BLDV-MCNC: 14 MG/DL (ref 6–23)
CALCIUM SERPL-MCNC: 8.4 MG/DL (ref 8.6–10.2)
CHLORIDE BLD-SCNC: 103 MMOL/L (ref 98–107)
CO2: 26 MMOL/L (ref 22–29)
CREAT SERPL-MCNC: 1 MG/DL (ref 0.5–1)
EOSINOPHILS ABSOLUTE: 0.12 E9/L (ref 0.05–0.5)
EOSINOPHILS RELATIVE PERCENT: 1.1 % (ref 0–6)
GFR AFRICAN AMERICAN: >60
GFR NON-AFRICAN AMERICAN: 56 ML/MIN/1.73
GLUCOSE BLD-MCNC: 88 MG/DL (ref 74–99)
HCT VFR BLD CALC: 37.8 % (ref 34–48)
HEMOGLOBIN: 12 G/DL (ref 11.5–15.5)
IMMATURE GRANULOCYTES #: 0.04 E9/L
IMMATURE GRANULOCYTES %: 0.4 % (ref 0–5)
LYMPHOCYTES ABSOLUTE: 1.3 E9/L (ref 1.5–4)
LYMPHOCYTES RELATIVE PERCENT: 11.6 % (ref 20–42)
MCH RBC QN AUTO: 30.6 PG (ref 26–35)
MCHC RBC AUTO-ENTMCNC: 31.7 % (ref 32–34.5)
MCV RBC AUTO: 96.4 FL (ref 80–99.9)
MONOCYTES ABSOLUTE: 0.68 E9/L (ref 0.1–0.95)
MONOCYTES RELATIVE PERCENT: 6.1 % (ref 2–12)
NEUTROPHILS ABSOLUTE: 8.97 E9/L (ref 1.8–7.3)
NEUTROPHILS RELATIVE PERCENT: 80.3 % (ref 43–80)
PDW BLD-RTO: 13.8 FL (ref 11.5–15)
PLATELET # BLD: 324 E9/L (ref 130–450)
PMV BLD AUTO: 9.2 FL (ref 7–12)
POTASSIUM REFLEX MAGNESIUM: 4.6 MMOL/L (ref 3.5–5)
RBC # BLD: 3.92 E12/L (ref 3.5–5.5)
SODIUM BLD-SCNC: 138 MMOL/L (ref 132–146)
TOTAL PROTEIN: 6 G/DL (ref 6.4–8.3)
WBC # BLD: 11.2 E9/L (ref 4.5–11.5)

## 2022-06-15 PROCEDURE — 31645 BRNCHSC W/THER ASPIR 1ST: CPT | Performed by: INTERNAL MEDICINE

## 2022-06-15 PROCEDURE — 2700000000 HC OXYGEN THERAPY PER DAY

## 2022-06-15 PROCEDURE — 6370000000 HC RX 637 (ALT 250 FOR IP): Performed by: INTERNAL MEDICINE

## 2022-06-15 PROCEDURE — 94640 AIRWAY INHALATION TREATMENT: CPT

## 2022-06-15 PROCEDURE — 80053 COMPREHEN METABOLIC PANEL: CPT

## 2022-06-15 PROCEDURE — 85025 COMPLETE CBC W/AUTO DIFF WBC: CPT

## 2022-06-15 PROCEDURE — 31624 DX BRONCHOSCOPE/LAVAGE: CPT | Performed by: INTERNAL MEDICINE

## 2022-06-15 PROCEDURE — 71045 X-RAY EXAM CHEST 1 VIEW: CPT

## 2022-06-15 PROCEDURE — 36415 COLL VENOUS BLD VENIPUNCTURE: CPT

## 2022-06-15 PROCEDURE — G0378 HOSPITAL OBSERVATION PER HR: HCPCS

## 2022-06-15 PROCEDURE — 2580000003 HC RX 258: Performed by: INTERNAL MEDICINE

## 2022-06-15 PROCEDURE — 6360000002 HC RX W HCPCS: Performed by: INTERNAL MEDICINE

## 2022-06-15 RX ORDER — GUAIFENESIN 400 MG/1
400 TABLET ORAL 3 TIMES DAILY
Status: DISCONTINUED | OUTPATIENT
Start: 2022-06-15 | End: 2022-06-15 | Stop reason: HOSPADM

## 2022-06-15 RX ADMIN — ONDANSETRON 4 MG: 2 INJECTION INTRAMUSCULAR; INTRAVENOUS at 09:13

## 2022-06-15 RX ADMIN — IPRATROPIUM BROMIDE AND ALBUTEROL SULFATE 1 AMPULE: .5; 2.5 SOLUTION RESPIRATORY (INHALATION) at 15:45

## 2022-06-15 RX ADMIN — GUAIFENESIN 400 MG: 400 TABLET ORAL at 14:22

## 2022-06-15 RX ADMIN — SODIUM CHLORIDE, PRESERVATIVE FREE 10 ML: 5 INJECTION INTRAVENOUS at 09:13

## 2022-06-15 RX ADMIN — IPRATROPIUM BROMIDE AND ALBUTEROL SULFATE 1 AMPULE: .5; 2.5 SOLUTION RESPIRATORY (INHALATION) at 12:32

## 2022-06-15 RX ADMIN — VILAZODONE HYDROCHLORIDE 40 MG: 40 TABLET ORAL at 09:13

## 2022-06-15 RX ADMIN — ACETAMINOPHEN 650 MG: 325 TABLET ORAL at 09:13

## 2022-06-15 RX ADMIN — FAMOTIDINE 20 MG: 20 TABLET ORAL at 09:13

## 2022-06-15 RX ADMIN — GUAIFENESIN 400 MG: 400 TABLET ORAL at 10:30

## 2022-06-15 ASSESSMENT — PAIN DESCRIPTION - DESCRIPTORS: DESCRIPTORS: ACHING

## 2022-06-15 ASSESSMENT — PAIN SCALES - GENERAL
PAINLEVEL_OUTOF10: 0
PAINLEVEL_OUTOF10: 8

## 2022-06-15 ASSESSMENT — PAIN DESCRIPTION - ORIENTATION: ORIENTATION: MID

## 2022-06-15 ASSESSMENT — PAIN DESCRIPTION - LOCATION: LOCATION: HEAD

## 2022-06-15 NOTE — CARE COORDINATION
Patient underwent bronchoscopy and unfortunately her O2 saturation was only 87% post procedure on room air. She does have oxygen at home with a concentrator however does not have any portable oxygen available. She is only on nocturnal O2. Per her nurse, Peggy Dietrich her pulse ox with ambulation has remained 89 to 91 %. Her oxygen and nebulizer are from [facility]. Her daughter will transport to her house for this evening. Patient's PCP is Dr. Sujata Franks and she uses [facility] on [address].

## 2022-06-15 NOTE — PROGRESS NOTES
[place]  Department of Pulmonary, Critical Care and Sleep Medicine  [address] [facility], [place]  Department of Internal Medicine  Progress Note    SUBJECTIVE:  Seen and examined. Still with cough. She is currently off oxygen at time of my evaluation. Does complain of a slight headache which she states she had even prior to coming for her bronchoscopy yesterday. OBJECTIVE:  Vitals:    06/15/22 0736 06/15/22 1045 06/15/22 1232 06/15/22 1545   BP: (!) 99/59      Pulse: 74  77 86   Resp: 15  16 22   Temp: 98.6 °F (37 °C)      TempSrc: Temporal      SpO2: 91% 91% 93% 90%   Weight:       Height:         Constitutional: Alert,     EENT: EOMI SONIA. MMM. No icterus. No thrush. Neck: No thyromegaly. Trachea was midline. Respiratory: Symmetrical.  Breath sounds were clear. Cardiovascular: Regular, No murmur. No rubs. Pulses:  Equal bilaterally. Abdomen: Soft without organomegaly. No rebound, rigidity. No guarding. Lymphatic: No lymphadenopathy. Musculoskeletal: Without weakness or gross deficits  Extremities:  No lower extremity edema. Reflexes appear adequate. Skin:  Warm and dry. No skin rashes. Neurological/Psychiatric: No acute psychosis. Cranial nerves are intact. DATA:    Monitor Strips:  Reviewed & discusses with technical team. No changes noted.     RADIOLOGY:  Films were read/reviewed/discussed with radiology shows chest x-ray stable    CBC with Differential:    Lab Results   Component Value Date    WBC 11.2 06/15/2022    RBC 3.92 06/15/2022    HGB 12.0 06/15/2022    HCT 37.8 06/15/2022     06/15/2022    MCV 96.4 06/15/2022    MCH 30.6 06/15/2022    MCHC 31.7 06/15/2022    RDW 13.8 06/15/2022    LYMPHOPCT 11.6 06/15/2022    MONOPCT 6.1 06/15/2022    BASOPCT 0.5 06/15/2022    MONOSABS 0.68 06/15/2022    LYMPHSABS 1.30 06/15/2022    EOSABS 0.12 06/15/2022    BASOSABS 0.06 06/15/2022     CMP:    Lab Results   Component Value Date     06/15/2022    K 4.6 06/15/2022     06/15/2022    CO2 26 06/15/2022    BUN 14 06/15/2022    CREATININE 1.0 06/15/2022    GFRAA >60 06/15/2022    LABGLOM 56 06/15/2022    GLUCOSE 88 06/15/2022    PROT 6.0 06/15/2022    LABALBU 3.1 06/15/2022    CALCIUM 8.4 06/15/2022    BILITOT 0.3 06/15/2022    ALKPHOS 56 06/15/2022    AST 16 06/15/2022    ALT 9 06/15/2022          Assessment:   1. Acute hypoxemia-resolved  2. S/P Bronchoscopy  3. History of Mycobacterium Avium        Plan:   1. Patient has been weaned back to room air. Okay to discharge today. We will follow-up with her in the office. 2. Continue duonebs and albuterol prn  3. Flutter valve ordered.    4. Follow up respiratory cultures.            Gurinder Agustin, DO   Pulmonary, Critical Care and Sleep Medicine

## 2022-06-15 NOTE — PROGRESS NOTES
Writer assisted patient to ambulate in room with pulse oximetry. Patient noted to maintain O2 sats between 89-91% on room air while ambulating. Patient denies SOB with ambulation. Patient noted to have cough upon return to bed but recovered quickly.

## 2022-06-16 LAB
APPEARANCE FLUID: NORMAL
BASO FLUID: 0 %
CELL COUNT FLUID TYPE: NORMAL
COLOR FLUID: NORMAL
EOSINOPHIL FLUID: 0 %
GRAM STAIN ORDERABLE: NORMAL
LYMPHOCYTES, BODY FLUID: 10 %
MONOCYTE, FLUID: 2 %
NEUTROPHIL, FLUID: 88 %
NUCLEATED CELLS FLUID: 168 /UL
PNEUMOCYSTIS DFA: NORMAL
RBC FLUID: NORMAL /UL

## 2022-06-18 ENCOUNTER — TELEPHONE (OUTPATIENT)
Dept: PULMONOLOGY | Age: 67
End: 2022-06-18

## 2022-06-18 LAB
CULTURE, RESPIRATORY: ABNORMAL
CULTURE, RESPIRATORY: ABNORMAL
ORGANISM: ABNORMAL
SMEAR, RESPIRATORY: ABNORMAL

## 2022-06-18 RX ORDER — LEVOFLOXACIN 500 MG/1
500 TABLET, FILM COATED ORAL DAILY
Qty: 7 TABLET | Refills: 0 | Status: SHIPPED | OUTPATIENT
Start: 2022-06-18 | End: 2022-06-25

## 2022-06-18 NOTE — TELEPHONE ENCOUNTER
Received results of patient's BAL which is growing Pseudomonas. Sensitivities reviewed and this appears to be sensitive to Levaquin. We will start patient on Levaquin 500 mg a day and treat for 7 days. This dose is adjusted for her GFR. Called and spoke with the patient continues to have symptoms of increased secretions.   Instructed her on use of the antibiotic and she reports she will pick this up from [facility].  We will have her follow-up with us in the office in 4 to 6 weeks

## 2022-06-19 NOTE — PROGRESS NOTES
[facility]     HISTORY OF PRESENT ILLNESS:    Sophie Sharif is a 74y.o. year old female here for evaluation of cough, shortness of breath, bronchiectasis, history of MAC. Patient seen and examined in clinic. She is accompanied by her daughter Leann Faria. Unfortunately she is a poor historian she does report to me that she was initially diagnosed with MAC many years ago she has been treated with multiple antibiotics at that time subsequently was told the the MAC and gone away it then recurred at which point she was again started on antibiotics sounds like triple therapy and had significant difficulty with nausea and vomiting quit eating and drinking. Then presented to the hospital with an acute kidney injury which actually required dialysis. Since that time in 2020 she was seen once by Dr. Evert Chatterjee as a virtual visit. She has not followed up with pulmonary. She has declined any antibiotic therapy for her MAC. Most recently she had developed COVID-19 at that time she was prescribed Decadron, doxycycline, vitamin D, she was prescribed Paxlovid but did not take this. She reports that since being diagnosed with COVID over [facility] she is still having symptoms which include coughing, shortness of breath, and chills. She reports she is coughing a lot more. She is not using her nebulizers. Her appetite is extremely poor and she is losing weight. She is not currently vaccinated against COVID-19. I discussed multiple options with the patient and did discuss with her that I am concerned that this may be a recurrence or worsening of her MAC/bronchiectasis. We discussed increased airway hygiene methods including turner coughing, chest vest, and increased use of inhalers. At the time of our appointment today she declines chest vest therapy as she feels she will not be able to tolerate this due to her osteoporosis.   She is declining use of nebulizers due to feeling that they will \"make her sick. She reports being unable to do half coughing and unable or willing to use Acapella. She also reports not wanting to go back on any antibiotics as she feels that these caused renal failure in the past.    After extensive discussion with the patient and her daughter she was agreeable for a bronchoscopy. We did discuss using saline nebs using a flutter valve. Also increasing nutrition with caloric supplement. She is agreeable to this. See below for summary of extensive review of records in Care everywhere and Epic:   CT Chest  1. Bilateral centrilobular nodular and tree-in-bud opacities with atelectasis and bronchiectasis in the right middle lobe and right lower lobe. Findings are most likely secondary to chronic infectious or   inflammatory process. 2.  Right lower lobe nodular opacities measuring up to 1.5 cm with central cavitation/focal airway dilation are also likely secondary to the same chronic infectious or inflammatory process. Recommend short interval follow-up chest CT (3 months) to assess stability. : [facility], [place], operated by [facility]  Transcribe Date/Time: Nov 19 2015 10:59A  Admitted at [facility] Oct[facility]Nov 2016 [facility] due to [address]. Discharged on Azithromycin, rifabutin, prednisone. She was treated for 12 months. PFT 11/3/17 showed: Spirometry shows no obstruction. The reduced FVC suggests restriction. There is no significant bronchodilator response. The TLC is normal.  The RV and RV/TLC are elevated indicating air trapping. The diffusing capacity is normal.  From [id number] was followed by pulm at [facility] then again was seen in [place]. Sputum cx Sept 2018 showed pseudomonas and she was treated with Levaquin. CT of the chest at that time showed persistent fibrosis and bronchiectasis of the right middle lobe. Multiple cavitary nodules. She was treated inpatient in [place] in May of 2019 due to pseudomonas again. BAL 5/29/2019 was positive for KAMALA.  She was again treated with azithromycin, rifampin, ethambutol. Spirometry 6/2019: Spirometry shows no obstruction. The reduced FVC suggests restriction. Recommend lung volumes if clinically indicated. The diffusing capacity corrected for hemoglobin is normal.  She was admitted to the hospital locally 12/28/2019 due to increased shortness of breath she was initially seen by Dr. Radha Reaves and Dr. Madison Baker. She tested positive for both RSV . She was discharged on 1/1/2020  Repeat Cx on 2/17 again showed MAC. April 20, 2020 she was then supposed to be started on Amikacin IV 3 times a week, rifampin, ethambutol, and clarithromycin. She did not end up taking the amikacin and per notes stopped the po antibiotics due to feeling ill. She did start amikacin inhaled. Plan was then changed to amikacin inhaled with ethambutol and rifampin three times a week. Presented to the ER on 5/20 due to intractable nausea, she was found to have MATTEO with hyperkalemia with CR 12.8 and . During this admission she did require HD. She was discharged on 5/31/20  Virtual visit with Dr. Andre John 6-2019  Admitted 1/5/2021 due to Septic Shock, She was treated for community acquired pna and discharged on 1/10/21. She has not been seen by pulmonary since then. She did test positive foe COVID-19 on 4/20/2022. She was treated with decadron, doxycycline, vitamin D. Paxlovid was also ordered put not taken.      ALLERGIES:    Allergies   Allergen Reactions    Rifampin Other (See Comments)     States this medicine caused Renal Failure       PAST MEDICAL HISTORY:       Diagnosis Date    Bronchiectasis ([address]     COPD [facility]chronic obstructive pulmonary disease) ([facility])     Depression     Hx of blood clots     lung    Mycobacterium avium complex (HCC)        MEDICATIONS:   Current Outpatient Medications   Medication Sig Dispense Refill    levoFLOXacin (LEVAQUIN) 500 MG tablet Take 1 tablet by mouth daily for 7 days 7 tablet 0    famotidine (PEPCID) 20 MG tablet TAKE 1 TABLET BY MOUTH TWICE A  tablet 1    albuterol sulfate  (90 Base) MCG/ACT inhaler Inhale 2 puffs into the lungs every 6 hours as needed for Wheezing 3 Inhaler 1    vilazodone HCl (VILAZODONE HCL) 40 MG TABS Take 40 mg by mouth daily      ipratropium-albuterol (DUONEB) 0.5-2.5 (3) MG/3ML SOLN nebulizer solution Inhale 3 mLs into the lungs every 4 hours (while awake) . 360 mL 0     No current facility-administered medications for this visit. SOCIAL AND OCCUPATIONAL HEALTH: The patient is a former smoker. She quit smoking around the age of 38. Has a 33-pack-year smoking history. Had a prior PE. Denies any known inhalational exposures. No recent travel. SURGICAL HISTORY:   Past Surgical History:   Procedure Laterality Date    BRONCHOSCOPY N/A 12/30/2019    BRONCHOSCOPY ALVEOLAR LAVAGE performed by Fariha Gomez MD at [address] [place] [address] N/A 6/14/2022    BRONCHOSCOPY DIAGNOSTIC OR CELL [address] ONLY-BAL performed by Eboni Cowan DO at [facility] ENDOSCOPY       FAMILY HISTORY: No family history of cancer, blood clots     REVIEW OF SYSTEMS:  Constitutional: Positive for fever, chills, night sweats, unintentional weight loss  Skin: No rashes or lesions  EENT: No change in vision, change in hearing, change in taste, change in smell  Cardiovascular: Denies chest pain, chest pressure, palpitations  Respiration: Positive for productive cough, shortness of breath, wheezing  Gastrointestinal: Denies nausea, vomiting, diarrhea. Positive for anorexia  Musculoskeletal: Denies joint or muscle pain  Neurological: Denies syncope, headache, seizures  Psychological: Denies anxiety or depression  Endocrine: Denies polyuria polydipsia  Hematopoietic/lymphatic: Denies easy bruising        PHYSICAL EXAMINATION:  Constitutional: Patient is thin and frail appearing but tearful  EENT: PERRL, EOMI, no oropharyngeal erythema.   No palpable adenopathy  Neck: Trachea and thyroid disease. At this time we will schedule her for bronchoscopy. We have ordered nebulizers, requested her to do the flutter valve, also ordered patient on supplementation. 2.  I did discuss the importance of vaccination for pneumonia along with COVID-19 with the patient. She adamantly refuses at this time. 3.  Should the patient's symptoms continue to worsen and she continues to refuse treatments at this time we may need to consider referral to palliative medicine. I spent 55 minutes with the patient and her daughter. I spent an additional 60 minutes reviewing all of her records from both our facility and [facility] clinic. I hope this updates you on my evaluation and clinical thinking. Thank you for allowing me to participate in his care.      Sincerely,        Yony Blanchard.  Office: [phone]  Fax: [phone]

## 2022-06-19 NOTE — OP NOTE
Operative Note      Patient: Sophie Sharif  YOB: 1955  MRN: [id number]    Date of Procedure: 6/14/2022    Pre-Op Diagnosis: Bronchiectasis    Post-Op Diagnosis: Same       Procedure(s):  BRONCHOSCOPY DIAGNOSTIC OR CELL [address] ONLY-BAL    Surgeon(s): Jenni Nieto DO    Assistant:   * No surgical staff found *    Anesthesia: Monitor Anesthesia Care    Estimated Blood Loss (mL): Minimal    Complications: Hypoxemia    Specimens:   ID Type Source Tests Collected by Time Destination   1 : RML BAL; Add PCP and Galactomanin Respiratory BAL- Bronch. Lavage CELL COUNT WITH DIFFERENTIAL, BODY FLUID, CULTURE, FUNGUS, GRAM STAIN, CULTURE WITH SMEAR, ACID FAST BACILLIUS, CULTURE, RESPIRATORY Jenni Nieto DO 6/14/2022 1439        Implants:  * No implants in log *      Drains: * No LDAs found *    Findings: 1. Copious secretions present right middle and right lower lobes 2. Diffusely inflamed erythematous mucosal throughout bilateral lungs    Detailed Description of Procedure:   After informed consent was obtained the patient was brought to the bronchoscopy suite. Oropharynx was numbed with Cetacaine. A bite block was placed. She was medicated with propofol with the assistance of the nurse anesthetist.  The fiberoptic bronchoscope was advanced through the vocal cords. Immediately we encountered copious thin secretions throughout the right mainstem bronchus, right lower lobe, and right middle lobe. These were therapeutically suction. The airways were noted to be diffusely erythematous and inflamed. BAL was carried out in the right middle lobe with instillation of 120 mL with return of 40 mL. At this time the patient did briefly have an episode of hypoxemia with her oxygen saturation dropping to the 70%. The scope was withdrawn and the patient was given 100% oxygen via nonrebreather. Her oxygen saturation immediately returned to the low 90s.   The fiberoptic bronchoscope was then again advanced through the vocal cords and complete examination of all of the airways to the seventh 11th bronchi was carried out bilaterally. Again the airways were noted to be diffusely edematous and erythematous.     Electronically signed by Gaetano Garcia DO on 6/18/2022 at 10:31 PM

## 2022-06-20 LAB
ASPERGILLUS GALACTO AG: NEGATIVE
ASPERGILLUS GALACTO INDEX: 0.1

## 2022-06-23 ENCOUNTER — OFFICE VISIT (OUTPATIENT)
Dept: FAMILY MEDICINE CLINIC | Age: 67
End: 2022-06-23
Payer: MEDICARE

## 2022-06-23 VITALS
WEIGHT: 106 LBS | BODY MASS INDEX: 19.51 KG/M2 | HEART RATE: 83 BPM | DIASTOLIC BLOOD PRESSURE: 70 MMHG | HEIGHT: 62 IN | SYSTOLIC BLOOD PRESSURE: 100 MMHG | OXYGEN SATURATION: 94 % | TEMPERATURE: 97.7 F | RESPIRATION RATE: 18 BRPM

## 2022-06-23 DIAGNOSIS — J47.0 BRONCHIECTASIS WITH ACUTE LOWER RESPIRATORY INFECTION (HCC): ICD-10-CM

## 2022-06-23 DIAGNOSIS — Z86.19 HISTORY OF MAC INFECTION: ICD-10-CM

## 2022-06-23 DIAGNOSIS — J96.01 ACUTE RESPIRATORY FAILURE WITH HYPOXIA (HCC): Primary | ICD-10-CM

## 2022-06-23 PROCEDURE — G8420 CALC BMI NORM PARAMETERS: HCPCS | Performed by: FAMILY MEDICINE

## 2022-06-23 PROCEDURE — 1036F TOBACCO NON-USER: CPT | Performed by: FAMILY MEDICINE

## 2022-06-23 PROCEDURE — 99215 OFFICE O/P EST HI 40 MIN: CPT | Performed by: FAMILY MEDICINE

## 2022-06-23 PROCEDURE — 1123F ACP DISCUSS/DSCN MKR DOCD: CPT | Performed by: FAMILY MEDICINE

## 2022-06-23 PROCEDURE — 3017F COLORECTAL CA SCREEN DOC REV: CPT | Performed by: FAMILY MEDICINE

## 2022-06-23 PROCEDURE — G8427 DOCREV CUR MEDS BY ELIG CLIN: HCPCS | Performed by: FAMILY MEDICINE

## 2022-06-23 PROCEDURE — G8400 PT W/DXA NO RESULTS DOC: HCPCS | Performed by: FAMILY MEDICINE

## 2022-06-23 PROCEDURE — 1090F PRES/ABSN URINE INCON ASSESS: CPT | Performed by: FAMILY MEDICINE

## 2022-06-23 ASSESSMENT — ENCOUNTER SYMPTOMS
SINUS PAIN: 0
SHORTNESS OF BREATH: 1
DIARRHEA: 0
COUGH: 1
WHEEZING: 0
VOICE CHANGE: 0
NAUSEA: 0
VOMITING: 0
ABDOMINAL PAIN: 0
CONSTIPATION: 0

## 2022-06-23 ASSESSMENT — PATIENT HEALTH QUESTIONNAIRE - PHQ9
SUM OF ALL RESPONSES TO PHQ QUESTIONS 1-9: 2
2. FEELING DOWN, DEPRESSED OR HOPELESS: 1
SUM OF ALL RESPONSES TO PHQ9 QUESTIONS 1 & 2: 2
SUM OF ALL RESPONSES TO PHQ QUESTIONS 1-9: 2
1. LITTLE INTEREST OR PLEASURE IN DOING THINGS: 1
SUM OF ALL RESPONSES TO PHQ QUESTIONS 1-9: 2
SUM OF ALL RESPONSES TO PHQ QUESTIONS 1-9: 2

## 2022-06-23 NOTE — PROGRESS NOTES
[facility]        SUBJECTIVE:  CC: had concerns including Follow-Up from Hospital (Pt presents to the office following discharge on 6/15/22 following a Bronchoscopy.) and Other (Pt did a walk test. Baseline was 94% when initial vitals were taken, 95% before walking, 92% after walking for 5 minutes. Pt got dizzy and light headed at 4 minutes 30 seconds and we stopped and she sat down. ). HPI:  Kun Reis is a female 74 y.o. presented to the clinic for follow up after recent hospitalization from -6/15 for hypoxemia s/p Bronchoscopy. She has a significant history of MAC. She has been untreated in recent years because patient has refused treatment. She is now reconsidering potential treatment at this time. She understands without treatment she could die. She is currently on a Levaquin regimen with 2 days remaining for Pseudomonas on BAL, with other cytology pending. She reports improvement in her cough and breathing. She was sent home on 2L of O2. She is only is using her Oxygen at night. She admits that anytime she does anything active her Oxygen \"plummets. 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 She was at work a few days ago, but only was able to be there <2 hours of her shift. Review of Systems   Constitutional: Negative for appetite change, fatigue and fever. HENT: Negative for congestion, sinus pain and voice change. Respiratory: Positive for cough and shortness of breath. Negative for wheezing. Cardiovascular: Negative for chest pain and palpitations. Gastrointestinal: Negative for abdominal pain, constipation, diarrhea, nausea and vomiting.        Outpatient Medications Marked as Taking for the 22 encounter (Office Visit) with Hernando Marmolejo MD   Medication Sig Dispense Refill    [] levoFLOXacin (LEVAQUIN) 500 MG tablet Take 1 tablet by mouth daily for 7 days 7 tablet 0    famotidine (PEPCID) 20 MG tablet TAKE 1 TABLET BY MOUTH TWICE A DAY 180 tablet 1    albuterol sulfate  (90 Base) MCG/ACT inhaler Inhale 2 puffs into the lungs every 6 hours as needed for Wheezing 3 Inhaler 1    vilazodone HCl (VILAZODONE HCL) 40 MG TABS Take 40 mg by mouth daily      ipratropium-albuterol (DUONEB) 0.5-2.5 (3) MG/3ML SOLN nebulizer solution Inhale 3 mLs into the lungs every 4 hours (while awake) . 360 mL 0       I have reviewed all pertinent PMHx, PSHx, FamHx, SocialHx, medications, and allergies and updated history as appropriate. OBJECTIVE    VS: /70   Pulse 83   Temp 97.7 °F (36.5 °C) (Temporal)   Resp 18   Ht 5' 2\" (1.575 m)   Wt 106 lb (48.1 kg)   LMP  (LMP Unknown)   SpO2 94%   Breastfeeding No   BMI 19.39 kg/m²   Physical Exam  Constitutional:       General: She is not in acute distress. Appearance: She is well-developed. She is not diaphoretic. HENT:      Head: Normocephalic and atraumatic. Eyes:      Conjunctiva/sclera: Conjunctivae normal.      Pupils: Pupils are equal, round, and reactive to light. Cardiovascular:      Rate and Rhythm: Normal rate and regular rhythm. Pulmonary:      Effort: Pulmonary effort is normal.      Breath sounds: No stridor. Wheezing (intermittent) present. No rhonchi. Abdominal:      General: Bowel sounds are normal. There is no distension. Palpations: Abdomen is soft. Tenderness: There is no abdominal tenderness. Hernia: No hernia is present. Comments: cough   Musculoskeletal:      Cervical back: Normal range of motion and neck supple. Skin:     General: Skin is warm and dry. Neurological:      Mental Status: She is alert and oriented to person, place, and time. ASSESSMENT/PLAN:  1. Acute respiratory failure with hypoxia (HCC)  #1-3 secondary to bronchiectasis. Recent growth of Pseudomonas on BAL. Complete Levaquin regimen. Remaining Cytology still pending. Advised patient to wear supplemental O2 consistently at this time as prescribed.  Just got Duoneb inhalers and will start. Patient is waiting on a chest vest prescribed by Pulmonology and pending from [address]. Patient to speak to her HR at [employer] at send over [facility] paperwork to office. History of MAC untreated in recent years secondary to patient refusal. Patient is reconsidering at this time. Spoke to Dr. Schafer Members regarding patient's case and updated her on today's apt. Consideration for ID referral re discussed. Dr. Schafer Members will advise further pending remaining cultures. Consider Pulmonary rehab in the future also discussed. 2. Bronchiectasis with acute lower respiratory infection ([facility] [address]    3. History of MAC infection    >40 minutes spent on visit. I have reviewed my findings and recommendations with Cami Carrasco MD  7/6/2022 3:08 PM  Return in about 2 weeks (around 7/7/2022). Counseled regarding above diagnosis, including possible risks and complications, especially if left uncontrolled. Patient counseled on red flag symptoms and if they occur to go to the ED. Discussed medications risk/benefits and possible side effects and alternatives to treatment. Patient and/or guardian verbalizes understanding, agrees, feels comfortable with and wishes to proceed with above treatment plan. Advised patient regarding importance of keeping up with recommended health maintenance and to schedule as soon as possible if overdue, as this is important in assessing for undiagnosed pathology, especially cancer, as well as protecting against potentially harmful/life threatening disease. Patient and/or guardian verbalizes understanding and agrees with above counseling, assessment and plan. All questions answered. Please note this report has been partially produced using speech recognition software  and may contain errors related to that system including grammar, punctuation and spelling as well as words and phrases that may seem inappropriate.  If there are questions or concerns please feel free to contact me to clarify.

## 2022-06-28 ENCOUNTER — TELEPHONE (OUTPATIENT)
Dept: PULMONOLOGY | Age: 67
End: 2022-06-28

## 2022-06-28 NOTE — PLAN OF CARE
Received a critical message [employer] from Microbiology this evening regarding the patient's bronchoscopy sample testing positive for AFB smear. Updated and discussed the results with Dr. Jason Pablo, who did the patient's bronchoscopy. The patient does have a history of MAC, which has remained untreated per the patient's choice. This has been previously extensively discussed with the patient by Dr. Jason Pablo. Plan is to wait for speciation, after which the Pulmonology office will take over the care of the patient and contact the patient with further plans for the same.

## 2022-06-28 NOTE — TELEPHONE ENCOUNTER
Received a critical message [employer] from Microbiology this evening regarding the patient's bronchoscopy sample testing positive for AFB smear. Updated and discussed the results with Dr. Adelaide Badillo, who did the patient's bronchoscopy. The patient does have a history of MAC, which has remained untreated per the patient's choice. This has been previously extensively discussed with the patient by Dr. Adelaide Badillo. Plan is to wait for speciation, after which the Pulmonology office will take over the care of the patient and contact the patient with further plans for the same.

## 2022-07-07 ENCOUNTER — OFFICE VISIT (OUTPATIENT)
Dept: FAMILY MEDICINE CLINIC | Age: 67
End: 2022-07-07
Payer: MEDICARE

## 2022-07-07 VITALS
WEIGHT: 106.8 LBS | TEMPERATURE: 98.1 F | HEART RATE: 64 BPM | OXYGEN SATURATION: 99 % | SYSTOLIC BLOOD PRESSURE: 100 MMHG | BODY MASS INDEX: 19.65 KG/M2 | RESPIRATION RATE: 16 BRPM | HEIGHT: 62 IN | DIASTOLIC BLOOD PRESSURE: 64 MMHG

## 2022-07-07 DIAGNOSIS — J96.01 ACUTE RESPIRATORY FAILURE WITH HYPOXIA (HCC): ICD-10-CM

## 2022-07-07 DIAGNOSIS — J47.0 BRONCHIECTASIS WITH ACUTE LOWER RESPIRATORY INFECTION (HCC): Primary | ICD-10-CM

## 2022-07-07 DIAGNOSIS — G47.30 SLEEP APNEA, UNSPECIFIED TYPE: ICD-10-CM

## 2022-07-07 PROCEDURE — 3017F COLORECTAL CA SCREEN DOC REV: CPT | Performed by: FAMILY MEDICINE

## 2022-07-07 PROCEDURE — G8420 CALC BMI NORM PARAMETERS: HCPCS | Performed by: FAMILY MEDICINE

## 2022-07-07 PROCEDURE — G8400 PT W/DXA NO RESULTS DOC: HCPCS | Performed by: FAMILY MEDICINE

## 2022-07-07 PROCEDURE — 1036F TOBACCO NON-USER: CPT | Performed by: FAMILY MEDICINE

## 2022-07-07 PROCEDURE — 1090F PRES/ABSN URINE INCON ASSESS: CPT | Performed by: FAMILY MEDICINE

## 2022-07-07 PROCEDURE — 1123F ACP DISCUSS/DSCN MKR DOCD: CPT | Performed by: FAMILY MEDICINE

## 2022-07-07 PROCEDURE — G8427 DOCREV CUR MEDS BY ELIG CLIN: HCPCS | Performed by: FAMILY MEDICINE

## 2022-07-07 PROCEDURE — 99214 OFFICE O/P EST MOD 30 MIN: CPT | Performed by: FAMILY MEDICINE

## 2022-07-07 RX ORDER — ACETAMINOPHEN 500 MG
500 TABLET ORAL DAILY PRN
COMMUNITY

## 2022-07-07 ASSESSMENT — ENCOUNTER SYMPTOMS
COUGH: 0
CONSTIPATION: 0
DIARRHEA: 0
WHEEZING: 0
ABDOMINAL PAIN: 0
NAUSEA: 0
VOMITING: 0
SHORTNESS OF BREATH: 0

## 2022-07-07 NOTE — LETTER
[facility]  [address] [facility] [address]  Phone: [phone]  Fax: Adilene Boyer MD        July 7, 2022     Patient: José Miguel Dawson   YOB: 1955   Date of Visit: 7/7/2022       To Whom It May Concern: The above patient was seen in my office today. She is able to return to work 7/8/22. For chronic condition, recommend [facility] paperwork. If you have any questions or concerns, please don't hesitate to call.     Sincerely,        Yeimy Lombardi MD

## 2022-07-07 NOTE — PROGRESS NOTES
98.1 °F (36.7 °C)   Resp 16   Ht 5' 2\" (1.575 m)   Wt 106 lb 12.8 oz (48.4 kg)   LMP  (LMP Unknown)   SpO2 99%   BMI 19.53 kg/m²   Physical Exam  Constitutional:       General: She is not in acute distress. Appearance: She is well-developed. She is not diaphoretic. HENT:      Head: Normocephalic and atraumatic. Eyes:      Conjunctiva/sclera: Conjunctivae normal.      Pupils: Pupils are equal, round, and reactive to light. Cardiovascular:      Rate and Rhythm: Normal rate and regular rhythm. Pulmonary:      Effort: Pulmonary effort is normal.      Breath sounds: No stridor. No rhonchi. Abdominal:      General: Bowel sounds are normal. There is no distension. Palpations: Abdomen is soft. Tenderness: There is no abdominal tenderness. Hernia: No hernia is present. Comments: cough   Musculoskeletal:      Cervical back: Normal range of motion and neck supple. Skin:     General: Skin is warm and dry. Neurological:      Mental Status: She is alert and oriented to person, place, and time. ASSESSMENT/PLAN:  1. Bronchiectasis with acute lower respiratory infection ([facility] [address]  Continue treatments as prescribed. MAC positive and sensitivity to follow 7/6. Spoke to [facility] and they will follow up with patient pending sensitivities for treatment or ID referral. Patient states she did not get the vest, because it was too expensive. HR form received. Patient passed 6 min walk test today. Pulmonary rehab order sent. - [facility]    2. Acute respiratory failure with hypoxia [facility])  - External Referral To Pulmonary Rehab    3. Sleep apnea, unspecified type  [facility]  - Home Sleep Study; Future      I have reviewed my findings and recommendations with Dang Ruano MD  7/22/2022 1:40 AM  Return in about 4 weeks (around 8/4/2022).      Counseled regarding above diagnosis, including possible risks and complications, especially if left uncontrolled. Patient counseled on red flag symptoms and if they occur to go to the ED. Discussed medications risk/benefits and possible side effects and alternatives to treatment. Patient and/or guardian verbalizes understanding, agrees, feels comfortable with and wishes to proceed with above treatment plan. Advised patient regarding importance of keeping up with recommended health maintenance and to schedule as soon as possible if overdue, as this is important in assessing for undiagnosed pathology, especially cancer, as well as protecting against potentially harmful/life threatening disease. Patient and/or guardian verbalizes understanding and agrees with above counseling, assessment and plan. All questions answered. Please note this report has been partially produced using speech recognition software  and may contain errors related to that system including grammar, punctuation and spelling as well as words and phrases that may seem inappropriate. If there are questions or concerns please feel free to contact me to clarify.

## 2022-07-08 ENCOUNTER — TELEPHONE (OUTPATIENT)
Dept: SLEEP CENTER | Age: 67
End: 2022-07-08

## 2022-07-11 ENCOUNTER — TELEPHONE (OUTPATIENT)
Dept: SLEEP CENTER | Age: 67
End: 2022-07-11

## 2022-07-12 ENCOUNTER — TELEPHONE (OUTPATIENT)
Dept: PULMONOLOGY | Age: 67
End: 2022-07-12

## 2022-07-12 NOTE — TELEPHONE ENCOUNTER
Contacted pt and reviewed PFT results showing consistent with diagnosis of COPD, emphysema, and MAC. Pt verbalized understanding and denied any questions or concerns at this time.

## 2022-07-18 LAB
FUNGUS (MYCOLOGY) CULTURE: NORMAL
FUNGUS STAIN: NORMAL

## 2022-07-20 LAB
AFB CULTURE (MYCOBACTERIA): ABNORMAL
AFB SMEAR: ABNORMAL
Lab: NORMAL
ORGANISM: ABNORMAL
REPORT: NORMAL
THIS TEST SENT TO: NORMAL

## 2022-07-21 ENCOUNTER — TELEPHONE (OUTPATIENT)
Dept: CARDIAC REHAB | Age: 67
End: 2022-07-21

## 2022-07-21 NOTE — TELEPHONE ENCOUNTER
Spoke with the patient. She does not qualify for phase 2. Offered phase 3. She doesn't think she wants to do that. Will call if she does.

## 2022-07-22 ASSESSMENT — ENCOUNTER SYMPTOMS
SINUS PAIN: 0
VOICE CHANGE: 0

## 2022-07-25 ENCOUNTER — TELEPHONE (OUTPATIENT)
Dept: PULMONOLOGY | Age: 67
End: 2022-07-25

## 2022-07-25 DIAGNOSIS — A31.0 MYCOBACTERIUM AVIUM COMPLEX (HCC): Primary | ICD-10-CM

## 2022-07-25 DIAGNOSIS — Z86.19 HISTORY OF MYCOBACTERIUM AVIUM COMPLEX INFECTION: ICD-10-CM

## 2022-07-25 DIAGNOSIS — A31.9 MYCOBACTERIOSIS: ICD-10-CM

## 2022-08-23 ENCOUNTER — OFFICE VISIT (OUTPATIENT)
Dept: FAMILY MEDICINE CLINIC | Age: 67
End: 2022-08-23
Payer: MEDICARE

## 2022-08-23 VITALS
WEIGHT: 106 LBS | BODY MASS INDEX: 19.51 KG/M2 | RESPIRATION RATE: 16 BRPM | TEMPERATURE: 98.1 F | HEART RATE: 89 BPM | SYSTOLIC BLOOD PRESSURE: 112 MMHG | OXYGEN SATURATION: 97 % | DIASTOLIC BLOOD PRESSURE: 60 MMHG | HEIGHT: 62 IN

## 2022-08-23 DIAGNOSIS — A31.0 MYCOBACTERIUM AVIUM COMPLEX (HCC): ICD-10-CM

## 2022-08-23 PROCEDURE — 1123F ACP DISCUSS/DSCN MKR DOCD: CPT | Performed by: FAMILY MEDICINE

## 2022-08-23 PROCEDURE — 99214 OFFICE O/P EST MOD 30 MIN: CPT | Performed by: FAMILY MEDICINE

## 2022-08-23 RX ORDER — ALBUTEROL SULFATE 90 UG/1
2 AEROSOL, METERED RESPIRATORY (INHALATION)
Qty: 1 EACH | Refills: 1 | Status: SHIPPED
Start: 2022-08-23 | End: 2022-10-10

## 2022-08-23 NOTE — PROGRESS NOTES
[facility])  Family Medicine Outpatient        SUBJECTIVE:  CC: had concerns including COPD (Follow up.) and Medication Refill (Pharmacy verified and med pended. ). HPI:  Elva Bennett is a female 74 y.o. presented to the clinic for an established visit. She last had labs in June. She saw ID 7/27. She was advised on a regimen for her MAC, but the patient declined. She is following with Pulmonology. She had a sleep study done. She is seeing ID tomorrow and Pulmonology Monday. She states she didn't qualify for pulmonary rehab and she never got the vest, because it was too expensive. She is using her inhalers. Review of Systems   Constitutional:  Positive for fatigue. Negative for appetite change and fever. Respiratory:  Positive for shortness of breath (chronic). Negative for cough and wheezing. Cardiovascular:  Negative for chest pain and palpitations. Gastrointestinal:  Negative for abdominal pain, constipation, diarrhea, nausea and vomiting.      Outpatient Medications Marked as Taking for the 8/23/22 encounter (Office Visit) with Rosi Boothe MD   Medication Sig Dispense Refill    albuterol sulfate HFA (PROVENTIL;VENTOLIN;PROAIR) 108 (90 Base) MCG/ACT inhaler Inhale 2 puffs into the lungs every 6-8 hours as needed for Wheezing 1 each 1    Umeclidinium Bromide 62.5 MCG/INH AEPB 1 puff qd 1 each 1    acetaminophen (TYLENOL) 500 MG tablet Take 500 mg by mouth daily as needed for Pain      Multiple Vitamins-Minerals (ALIVE MULTI-VITAMIN PO) Take 1 tablet by mouth daily      Multiple Vitamins-Minerals (HAIR SKIN AND NAILS FORMULA PO) Take 1 tablet by mouth daily      famotidine (PEPCID) 20 MG tablet TAKE 1 TABLET BY MOUTH TWICE A DAY (Patient taking differently: Take 20 mg by mouth as needed) 180 tablet 1    vilazodone HCl (VIIBRYD) 40 MG TABS Take 40 mg by mouth daily      ipratropium-albuterol (DUONEB) 0.5-2.5 (3) MG/3ML SOLN nebulizer solution Inhale 3 mLs into the lungs every 4 hours (while awake) . (Patient taking differently: Inhale 3 mLs into the lungs in the morning and at bedtime .) 360 mL 0       I have reviewed all pertinent PMHx, PSHx, FamHx, SocialHx, medications, and allergies and updated history as appropriate. OBJECTIVE    VS: /60   Pulse 89   Temp 98.1 °F (36.7 °C)   Resp 16   Ht 5' 2\" (1.575 m)   Wt 106 lb (48.1 kg)   LMP  (LMP Unknown)   SpO2 97%   BMI 19.39 kg/m²   Physical Exam  Constitutional:       General: She is not in acute distress. Appearance: She is well-developed. She is not diaphoretic. HENT:      Head: Normocephalic and atraumatic. Eyes:      Conjunctiva/sclera: Conjunctivae normal.      Pupils: Pupils are equal, round, and reactive to light. Cardiovascular:      Rate and Rhythm: Normal rate and regular rhythm. Pulmonary:      Effort: Pulmonary effort is normal.      Breath sounds: No stridor. No rhonchi. Comments: Decreased breath sounds  Abdominal:      General: Bowel sounds are normal. There is no distension. Palpations: Abdomen is soft. Tenderness: There is no abdominal tenderness. Hernia: No hernia is present. Comments: cough   Musculoskeletal:      Cervical back: Normal range of motion and neck supple. Skin:     General: Skin is warm and dry. Neurological:      Mental Status: She is alert and oriented to person, place, and time. ASSESSMENT/PLAN:  1. Mycobacterium avium complex [facility])  Encourage patient to reconsider treatment for MAC, but declines. She is aware/understands/accepts increased risk of morbidity without treatment for MAC. Follow up with ID tomorrow and Pulmonology Monday. Patient to go for labs and CXR. - albuterol sulfate HFA (PROVENTIL;VENTOLIN;PROAIR) 108 (90 Base) MCG/ACT inhaler; Inhale 2 puffs into the lungs every 6-8 hours as needed for Wheezing  Dispense: 1 each; Refill: 1  - Umeclidinium Bromide 62.5 MCG/INH AEPB; 1 puff qd  Dispense: 1 each;  Refill: 1  - XR CHEST STANDARD (2 VW); Future  - CBC with Auto Differential; Future  - Basic Metabolic Panel; Future    I have reviewed my findings and recommendations with Percy Kraus MD  9/11/2022 10:30 PM  Return in about 4 months (around 12/23/2022). Counseled regarding above diagnosis, including possible risks and complications, especially if left uncontrolled. Patient counseled on red flag symptoms and if they occur to go to the ED. Discussed medications risk/benefits and possible side effects and alternatives to treatment. Patient and/or guardian verbalizes understanding, agrees, feels comfortable with and wishes to proceed with above treatment plan. Advised patient regarding importance of keeping up with recommended health maintenance and to schedule as soon as possible if overdue, as this is important in assessing for undiagnosed pathology, especially cancer, as well as protecting against potentially harmful/life threatening disease. Patient and/or guardian verbalizes understanding and agrees with above counseling, assessment and plan. All questions answered. Please note this report has been partially produced using speech recognition software  and may contain errors related to that system including grammar, punctuation and spelling as well as words and phrases that may seem inappropriate. If there are questions or concerns please feel free to contact me to clarify.

## 2022-08-24 ENCOUNTER — HOSPITAL ENCOUNTER (OUTPATIENT)
Dept: GENERAL RADIOLOGY | Age: 67
Discharge: HOME | End: 2022-08-26
Payer: MEDICARE

## 2022-08-24 ENCOUNTER — HOSPITAL ENCOUNTER (OUTPATIENT)
Age: 67
Discharge: HOME | End: 2022-08-26
Payer: MEDICARE

## 2022-08-24 ENCOUNTER — HOSPITAL ENCOUNTER (OUTPATIENT)
Age: 67
Discharge: HOME | End: 2022-08-24
Payer: MEDICARE

## 2022-08-24 DIAGNOSIS — A31.0 MYCOBACTERIUM AVIUM COMPLEX (HCC): ICD-10-CM

## 2022-08-24 LAB
ANION GAP SERPL CALCULATED.3IONS-SCNC: 12 MMOL/L (ref 7–16)
BASOPHILS ABSOLUTE: 0.07 E9/L (ref 0–0.2)
BASOPHILS RELATIVE PERCENT: 1 % (ref 0–2)
BUN BLDV-MCNC: 19 MG/DL (ref 6–23)
CALCIUM SERPL-MCNC: 9.7 MG/DL (ref 8.6–10.2)
CHLORIDE BLD-SCNC: 101 MMOL/L (ref 98–107)
CO2: 27 MMOL/L (ref 22–29)
CREAT SERPL-MCNC: 1 MG/DL (ref 0.5–1)
EOSINOPHILS ABSOLUTE: 0.2 E9/L (ref 0.05–0.5)
EOSINOPHILS RELATIVE PERCENT: 2.8 % (ref 0–6)
GFR AFRICAN AMERICAN: >60
GFR NON-AFRICAN AMERICAN: 56 ML/MIN/1.73
GLUCOSE BLD-MCNC: 93 MG/DL (ref 74–99)
HCT VFR BLD CALC: 40.9 % (ref 34–48)
HEMOGLOBIN: 13.1 G/DL (ref 11.5–15.5)
IMMATURE GRANULOCYTES #: 0.02 E9/L
IMMATURE GRANULOCYTES %: 0.3 % (ref 0–5)
LYMPHOCYTES ABSOLUTE: 1.33 E9/L (ref 1.5–4)
LYMPHOCYTES RELATIVE PERCENT: 18.3 % (ref 20–42)
MCH RBC QN AUTO: 30.2 PG (ref 26–35)
MCHC RBC AUTO-ENTMCNC: 32 % (ref 32–34.5)
MCV RBC AUTO: 94.2 FL (ref 80–99.9)
MONOCYTES ABSOLUTE: 0.48 E9/L (ref 0.1–0.95)
MONOCYTES RELATIVE PERCENT: 6.6 % (ref 2–12)
NEUTROPHILS ABSOLUTE: 5.15 E9/L (ref 1.8–7.3)
NEUTROPHILS RELATIVE PERCENT: 71 % (ref 43–80)
PDW BLD-RTO: 13 FL (ref 11.5–15)
PLATELET # BLD: 264 E9/L (ref 130–450)
PMV BLD AUTO: 9.1 FL (ref 7–12)
POTASSIUM SERPL-SCNC: 4.2 MMOL/L (ref 3.5–5)
RBC # BLD: 4.34 E12/L (ref 3.5–5.5)
SODIUM BLD-SCNC: 140 MMOL/L (ref 132–146)
WBC # BLD: 7.3 E9/L (ref 4.5–11.5)

## 2022-08-24 PROCEDURE — 71046 X-RAY EXAM CHEST 2 VIEWS: CPT

## 2022-08-24 PROCEDURE — 85025 COMPLETE CBC W/AUTO DIFF WBC: CPT

## 2022-08-24 PROCEDURE — 36415 COLL VENOUS BLD VENIPUNCTURE: CPT

## 2022-08-24 PROCEDURE — 80048 BASIC METABOLIC PNL TOTAL CA: CPT

## 2022-08-29 ENCOUNTER — OFFICE VISIT (OUTPATIENT)
Dept: PULMONOLOGY | Age: 67
End: 2022-08-29
Payer: MEDICARE

## 2022-08-29 VITALS
HEIGHT: 62 IN | SYSTOLIC BLOOD PRESSURE: 99 MMHG | BODY MASS INDEX: 19.51 KG/M2 | WEIGHT: 106 LBS | DIASTOLIC BLOOD PRESSURE: 57 MMHG | OXYGEN SATURATION: 95 % | HEART RATE: 76 BPM | TEMPERATURE: 98.6 F | RESPIRATION RATE: 16 BRPM

## 2022-08-29 DIAGNOSIS — R09.02 HYPOXEMIA: ICD-10-CM

## 2022-08-29 DIAGNOSIS — A31.0 MYCOBACTERIUM AVIUM COMPLEX (HCC): ICD-10-CM

## 2022-08-29 DIAGNOSIS — J47.9 BRONCHIECTASIS WITHOUT COMPLICATION (HCC): Primary | ICD-10-CM

## 2022-08-29 DIAGNOSIS — E46 PROTEIN-CALORIE MALNUTRITION, UNSPECIFIED SEVERITY (HCC): ICD-10-CM

## 2022-08-29 PROCEDURE — 1123F ACP DISCUSS/DSCN MKR DOCD: CPT | Performed by: INTERNAL MEDICINE

## 2022-08-29 PROCEDURE — 99213 OFFICE O/P EST LOW 20 MIN: CPT | Performed by: INTERNAL MEDICINE

## 2022-08-29 PROCEDURE — 99214 OFFICE O/P EST MOD 30 MIN: CPT | Performed by: INTERNAL MEDICINE

## 2022-08-29 NOTE — PROGRESS NOTES
Patient is to follow up with physician in 3 months. Patient will be referred to palliative care. Patient was reminded to use her flutter valve twice a day for 5 reps. Patient is to wear her oxygen during the night. Patient was given the pneumovax 23 vaccine under the direction of Dr. Deneen Mcdonald. AVS given.   Lot number:  [id number]  expiration date:  5/6/2024   NDC:  5579-5935-94

## 2022-08-29 NOTE — PATIENT INSTRUCTIONS
[facility]  [address] [place] [address], [facility] [place]  Office: [phone]      Your were seen in the office today for Shortness of breath/MAC      We  did not make changes to your medications today. Please use the flutter valve at least 5 breaths twice a day  Please wear oxygen at night. We will refer you to palliative care to assist with symptom management    Testing ordered today was none      Vaccines recommended Pneumococcal 23 valent              Please do not hesitate to call the office with any questions.

## 2022-08-31 NOTE — PROCEDURES
[address]                  [address] [place],  [facility]                               PULMONARY FUNCTION    PATIENT NAME: Radha SEVILLA                     :        1955  MED REC NO:   [id number]                            ROOM:  ACCOUNT NO:   [de-identified]                           ADMIT DATE: 2022  PROVIDER:     Garfield Schaefer DO    DATE OF PROCEDURE:  2022    HEIGHT:  62 inches. WEIGHT:  106 pounds. DIAGNOSIS:  History of COPD, MAC.    TOBACCO USE:  The patient smoked two packs a day for 20 years, quit 45  years ago. SPIROMETRY:  FVC is 1.52 liters which is 54% of predicted. FEV1 is 1.12  liters which is 50% of predicted. FEV1/FVC ratio is 73. There is a 5%  bronchodilator response. MVV is 56 which is 65% of predicted. LUNG VOLUMES:  SVC is 1.78 liters which is 63% of predicted. RV is 2.63  liters which is 132% of predicted. Total lung capacity is 4.41 liters  which is 92% of predicted. RV/TLC ratio is 60 which is 145% of  predicted. DIFFUSION:  Diffusion is 5.93 mL/min per mmHg which is 27% of predicted. Corrects for alveolar ventilation to 73% of predicted. FLOW VOLUME LOOP:  Flow volume loop with scooping, consistent with  obstruction. OVERALL INTERPRETATION:  Pulmonary function testing is consistent with  severe obstruction. There is also air trapping present. Diffusion is  severely reduced, however, this partially corrects for alveolar  ventilation, consistent with the patient's diagnosis of COPD, emphysema,  and MAC.         Jade Harris DO    D: 2022 13:50:25       T: 2022 13:52:47     [email or web address]  Job#: [id number]     Doc#: [id number]    CC:
normal...

## 2022-09-11 ASSESSMENT — ENCOUNTER SYMPTOMS
DIARRHEA: 0
WHEEZING: 0
VOMITING: 0
CONSTIPATION: 0
SHORTNESS OF BREATH: 1
COUGH: 0
ABDOMINAL PAIN: 0
NAUSEA: 0

## 2022-09-17 NOTE — PROGRESS NOTES
[facility]     HISTORY OF PRESENT ILLNESS:    Aristeo Howard is a 74y.o. year old female here for evaluation of cough, shortness of breath, bronchiectasis, history of MAC. Patient seen and examined in clinic. She is accompanied by her daughter Deepak Pack. Unfortunately she is a poor historian she does report to me that she was initially diagnosed with MAC many years ago she has been treated with multiple antibiotics at that time subsequently was told the the MAC and gone away it then recurred at which point she was again started on antibiotics sounds like triple therapy and had significant difficulty with nausea and vomiting quit eating and drinking. Then presented to the hospital with an acute kidney injury which actually required dialysis. Since that time in 2020 she was seen once by Dr. Moreno as a virtual visit. She has not followed up with pulmonary. She has declined any antibiotic therapy for her MAC. Most recently she had developed COVID-19 at that time she was prescribed Decadron, doxycycline, vitamin D, she was prescribed Paxlovid but did not take this. She reports that since being diagnosed with COVID over [facility] she is still having symptoms which include coughing, shortness of breath, and chills. She reports she is coughing a lot more. She is not using her nebulizers. Her appetite is extremely poor and she is losing weight. She is not currently vaccinated against COVID-19. I discussed multiple options with the patient and did discuss with her that I am concerned that this may be a recurrence or worsening of her MAC/bronchiectasis. We discussed increased airway hygiene methods including turner coughing, chest vest, and increased use of inhalers. At the time of our appointment today she declines chest vest therapy as she feels she will not be able to tolerate this due to her osteoporosis.   She is declining use of nebulizers due to shows no obstruction. The reduced FVC suggests restriction. There is no significant bronchodilator response. The TLC is normal.  The RV and RV/TLC are elevated indicating air trapping. The diffusing capacity is normal.  From [id number] was followed [facility] pulm [facility] then [facility]. Sputum cx Sept 2018 showed pseudomonas and she was treated with Levaquin. CT of the chest at that time showed persistent fibrosis and bronchiectasis of the right middle lobe. Multiple cavitary nodules. [facility] in May of 2019 due to pseudomonas again. BAL 5/29/2019 was positive for KAMALA. She was again treated with azithromycin, rifampin, ethambutol. Spirometry 6/2019: Spirometry shows no obstruction. The reduced FVC suggests restriction. Recommend lung volumes if clinically indicated. The diffusing capacity corrected for hemoglobin is normal.  She was admitted to the hospital locally 12/28/2019 due to increased shortness of breath she was initially seen by Dr. Kentrell Miles and Dr. Zully Brown. She tested positive for both RSV . She was discharged on 1/1/2020  Repeat Cx on 2/17 again showed MAC. April 20, 2020 she was then supposed to be started on Amikacin IV 3 times a week, rifampin, ethambutol, and clarithromycin. She did not end up taking the amikacin and per notes stopped the po antibiotics due to feeling ill. She did start amikacin inhaled. Plan was then changed to amikacin inhaled with ethambutol and rifampin three times a week. Presented to the ER on 5/20 due to intractable nausea, she was found to have MATTEO with hyperkalemia with CR 12.8 and . During this admission she did require HD. She was discharged on 5/31/20  Virtual visit with Dr. Elva Cheng 6-2019  Admitted 1/5/2021 due to Septic Shock, She was treated for community acquired pna and discharged on 1/10/21. She has not been seen by pulmonary since then. She did test positive foe COVID-19 on 4/20/2022.  She was treated with decadron, doxycycline, vitamin D. Paxlovid was also ordered put not taken. ALLERGIES:    Allergies   Allergen Reactions    Rifampin Other (See Comments)     States this medicine caused Renal Failure       PAST MEDICAL HISTORY:       Diagnosis Date    Bronchiectasis ([facility])     COPD (chronic obstructive pulmonary disease) ([facility])     Depression     Hx of blood clots     lung    Mycobacterium avium complex ([facility])        MEDICATIONS:   Current Outpatient Medications   Medication Sig Dispense Refill    albuterol sulfate HFA (PROVENTIL;VENTOLIN;PROAIR) 108 (90 Base) MCG/ACT inhaler Inhale 2 puffs into the lungs every 6-8 hours as needed for Wheezing 1 each 1    Umeclidinium Bromide 62.5 MCG/INH AEPB 1 puff qd 1 each 1    acetaminophen (TYLENOL) 500 MG tablet Take 500 mg by mouth daily as needed for Pain      Multiple Vitamins-Minerals (ALIVE MULTI-VITAMIN PO) Take 1 tablet by mouth daily      Multiple Vitamins-Minerals (HAIR SKIN AND NAILS FORMULA PO) Take 1 tablet by mouth daily      famotidine (PEPCID) 20 MG tablet TAKE 1 TABLET BY MOUTH TWICE A DAY (Patient taking differently: Take 20 mg by mouth as needed) 180 tablet 1    vilazodone HCl (VIIBRYD) 40 MG TABS Take 40 mg by mouth daily      ipratropium-albuterol (DUONEB) 0.5-2.5 (3) MG/3ML SOLN nebulizer solution Inhale 3 mLs into the lungs every 4 hours (while awake) . (Patient taking differently: Inhale 3 mLs into the lungs in the morning and at bedtime .) 360 mL 0     No current facility-administered medications for this visit. SOCIAL AND OCCUPATIONAL HEALTH: The patient is a former smoker. She quit smoking around the age of 38. Has a 33-pack-year smoking history. Had a prior PE. Denies any known inhalational exposures. No recent travel.     SURGICAL HISTORY:   Past Surgical History:   Procedure Laterality Date    BRONCHOSCOPY N/A 12/30/2019    BRONCHOSCOPY ALVEOLAR LAVAGE performed by Jamie Draper MD at [facility] N/A 6/14/2022    BRONCHOSCOPY DIAGNOSTIC OR CELL [address] ONLY-BAL performed by Sriram Velasquez DO at [facility] ENDOSCOPY       FAMILY HISTORY: No family history of cancer, blood clots     REVIEW OF SYSTEMS:  Constitutional: Currently denies fevers, chills, night sweats. Skin: No rashes or lesions  EENT: No change in vision, change in hearing, change in taste, change in smell  Cardiovascular: Denies chest pain, chest pressure, palpitations  Respiration: Positive for productive cough, shortness of breath, wheezing  Gastrointestinal: Denies nausea, vomiting, diarrhea. Positive for anorexia  Musculoskeletal: Denies joint or muscle pain  Neurological: Denies syncope, headache, seizures  Psychological: Denies anxiety or depression  Endocrine: Denies polyuria polydipsia  Hematopoietic/lymphatic: Denies easy bruising        PHYSICAL EXAMINATION:  Constitutional: Patient is thin and frail appearing    EENT: PERRL, EOMI, no oropharyngeal erythema. No palpable adenopathy  Neck: Trachea and thyroid midline  Respiratory: Scattered expiratory wheezes. Patient with moist cough  Cardiovascular: Regular rate and rhythm, no murmurs rubs or gallops  Pulses: Equal bilaterally  Abdomen: Soft nontender bowel sounds present  Lymphatic: No palpable adenopathy  Musculoskeletal: Gait steady  Extremities: No clubbing, cyanosis, edema. Skin: No rashes or lesions  Neurological/Psychiatric: Neurologically intact, no focal deficits. Affect appropriate    DATA: Spirometry was not completed in clinic today. However, pulmonary function testing on June 2, 2022 showed FVC of 1.52 L which is 54% of predicted. FEV1 of 1.12 L which is 50% of predicted. FEV1 FVC ratio of 73. There is 11% increase in FVC. MVV is 56 which is 65% of predicted. Lung volumes show SVC of 1.78 L which is 63% of predicted. RV is 2.63 L which is 132% of predicted. Total lung capacity is 4.41 L which is 92% of predicted.   Diffusion is 5.93 which is 27% of predicted corrected for alveolar ventilation to 73% of predicted. Flow volume loop is consistent with obstruction. CT chest May 5, 2022: Shows patchy bilateral airspace disease with cavitary lesions. Multiple cavitary lung lesions    IMPRESSION:       1. Bronchiectasis  2. History of Mycobacterium avium infections- currently active and not being treated. 3.  History of multiple Pseudomonas infections- recently treated with levaquin   4. Protein calorie malnutrition  5. Chronic cough  6. COPD  7. Former tobacco use  8. Need for vaccination  9. Difficulty with medical compliance  10. History of acute kidney injury requiring renal replacement therapy  11. Anxiety/depression              PLAN:      1.  I again discussed with the patient at length the importance of aggressive pulmonary hygiene. She has been encouraged to use the flutter valve at least 5 times a day. 2. She is to use the Incruse inhaler as prescribed. We discussed that it is not renally cleared and safe for her to take. 3. Extensively discussed the importance of treating her MAC infection. She continues to decline treatment with antibiotics. She is at risk of increased loss of pulmonary function, also at high risk for increased super imposed bacterial infections. I did also talk to her about whether or not she would be ok with mechanical ventilation should she end up in the hospital with a significant pna. Currently, she is ok with mechanical ventilation for a short period of time. I would like to send her to palliative care as an outpatient to see if they can assist us with managing her shortness of breath as well as assisting to facilitate discussions so that her goals of care are more congruous. 4.  I did discuss the importance of vaccination for pneumonia along with COVID-19 with the patient. She adamantly refuses at this time. Unfortunately she has very little reserve, an infection with covid or other pna may be devastating.    5. Discussed importance of wearing oxygen at night and throughout the day to keep sat >88%. Patient verbalized understanding. I hope this updates you on my evaluation and clinical thinking. Thank you for allowing me to participate in his care.      Sincerely,        Yony Blanchard.  Office: [phone]  Fax: [phone]

## 2022-09-22 ENCOUNTER — TELEPHONE (OUTPATIENT)
Dept: PALLATIVE CARE | Age: 67
End: 2022-09-22

## 2022-09-22 NOTE — TELEPHONE ENCOUNTER
Called and spoke with Zebedee Jeans regarding referral for Palliative care . Explained Palliative care and location of clinic. Appointment set 10/3/22 .

## 2022-10-09 DIAGNOSIS — A31.0 MYCOBACTERIUM AVIUM COMPLEX (HCC): ICD-10-CM

## 2022-10-10 RX ORDER — ALBUTEROL SULFATE 90 UG/1
2 AEROSOL, METERED RESPIRATORY (INHALATION)
Qty: 6.7 EACH | Refills: 0 | Status: SHIPPED | OUTPATIENT
Start: 2022-10-10

## 2022-10-17 ENCOUNTER — TELEPHONE (OUTPATIENT)
Dept: PALLATIVE CARE | Age: 67
End: 2022-10-17

## 2022-10-17 NOTE — TELEPHONE ENCOUNTER
Called pt to confirm appt the following day, pt stated she wanted to cancel because she had to work that day and was not able to come in for appt. Pt wanted phone number for PM to call back on her own to reschedule her appt at her convenience. Provided pt with phone number to contact when able.       Bella XIAO,W  Palliative Medicine

## 2022-10-22 DIAGNOSIS — A31.0 MYCOBACTERIUM AVIUM COMPLEX (HCC): ICD-10-CM

## 2022-10-25 NOTE — TELEPHONE ENCOUNTER
Called and lm for pt to call the office to schedule her AWV.     Electronically signed by Roberta Bhatt MA on 10/25/22 at 1:29 PM EDT

## 2022-11-06 DIAGNOSIS — A31.0 MYCOBACTERIUM AVIUM COMPLEX (HCC): ICD-10-CM

## 2022-11-08 RX ORDER — ALBUTEROL SULFATE 90 UG/1
2 AEROSOL, METERED RESPIRATORY (INHALATION)
Qty: 6.7 EACH | Refills: 0 | Status: SHIPPED | OUTPATIENT
Start: 2022-11-08

## 2022-12-13 ENCOUNTER — APPOINTMENT (OUTPATIENT)
Dept: GENERAL RADIOLOGY | Age: 67
DRG: 193 | End: 2022-12-13
Payer: MEDICARE

## 2022-12-13 ENCOUNTER — HOSPITAL ENCOUNTER (INPATIENT)
Age: 67
LOS: 1 days | Discharge: HOME | DRG: 193 | End: 2022-12-16
Attending: EMERGENCY MEDICINE | Admitting: FAMILY MEDICINE
Payer: MEDICARE

## 2022-12-13 ENCOUNTER — OFFICE VISIT (OUTPATIENT)
Dept: FAMILY MEDICINE CLINIC | Age: 67
End: 2022-12-13
Payer: MEDICARE

## 2022-12-13 VITALS
OXYGEN SATURATION: 92 % | HEART RATE: 100 BPM | WEIGHT: 105 LBS | RESPIRATION RATE: 16 BRPM | HEIGHT: 62 IN | SYSTOLIC BLOOD PRESSURE: 126 MMHG | TEMPERATURE: 99.3 F | BODY MASS INDEX: 19.32 KG/M2 | DIASTOLIC BLOOD PRESSURE: 84 MMHG

## 2022-12-13 DIAGNOSIS — I76 SEPTIC EMBOLISM (HCC): ICD-10-CM

## 2022-12-13 DIAGNOSIS — A31.0 MYCOBACTERIUM AVIUM COMPLEX (HCC): ICD-10-CM

## 2022-12-13 DIAGNOSIS — J96.21 ACUTE ON CHRONIC RESPIRATORY FAILURE WITH HYPOXIA (HCC): Primary | ICD-10-CM

## 2022-12-13 DIAGNOSIS — J10.1 INFLUENZA A: ICD-10-CM

## 2022-12-13 DIAGNOSIS — J10.1 INFLUENZA A: Primary | ICD-10-CM

## 2022-12-13 LAB
ADENOVIRUS BY PCR: NOT DETECTED
ADENOVIRUS BY PCR: NOT DETECTED
ALBUMIN SERPL-MCNC: 3.9 G/DL (ref 3.5–5.2)
ALP BLD-CCNC: 64 U/L (ref 35–104)
ALT SERPL-CCNC: 12 U/L (ref 0–32)
ANION GAP SERPL CALCULATED.3IONS-SCNC: 9 MMOL/L (ref 7–16)
AST SERPL-CCNC: 22 U/L (ref 0–31)
BASOPHILS ABSOLUTE: 0.03 E9/L (ref 0–0.2)
BASOPHILS RELATIVE PERCENT: 0.6 % (ref 0–2)
BILIRUB SERPL-MCNC: <0.2 MG/DL (ref 0–1.2)
BORDETELLA PARAPERTUSSIS BY PCR: NOT DETECTED
BORDETELLA PARAPERTUSSIS BY PCR: NOT DETECTED
BORDETELLA PERTUSSIS BY PCR: NOT DETECTED
BORDETELLA PERTUSSIS BY PCR: NOT DETECTED
BUN BLDV-MCNC: 14 MG/DL (ref 6–23)
CALCIUM SERPL-MCNC: 9.1 MG/DL (ref 8.6–10.2)
CHLAMYDOPHILIA PNEUMONIAE BY PCR: NOT DETECTED
CHLAMYDOPHILIA PNEUMONIAE BY PCR: NOT DETECTED
CHLORIDE BLD-SCNC: 97 MMOL/L (ref 98–107)
CO2: 29 MMOL/L (ref 22–29)
CORONAVIRUS 229E BY PCR: NOT DETECTED
CORONAVIRUS 229E BY PCR: NOT DETECTED
CORONAVIRUS HKU1 BY PCR: NOT DETECTED
CORONAVIRUS HKU1 BY PCR: NOT DETECTED
CORONAVIRUS NL63 BY PCR: NOT DETECTED
CORONAVIRUS NL63 BY PCR: NOT DETECTED
CORONAVIRUS OC43 BY PCR: NOT DETECTED
CORONAVIRUS OC43 BY PCR: NOT DETECTED
CREAT SERPL-MCNC: 0.9 MG/DL (ref 0.5–1)
D DIMER: <200 NG/ML DDU
EKG ATRIAL RATE: 82 BPM
EKG P AXIS: 62 DEGREES
EKG P-R INTERVAL: 140 MS
EKG Q-T INTERVAL: 398 MS
EKG QRS DURATION: 124 MS
EKG QTC CALCULATION (BAZETT): 464 MS
EKG R AXIS: 23 DEGREES
EKG T AXIS: 108 DEGREES
EKG VENTRICULAR RATE: 82 BPM
EOSINOPHILS ABSOLUTE: 0.01 E9/L (ref 0.05–0.5)
EOSINOPHILS RELATIVE PERCENT: 0.2 % (ref 0–6)
GFR SERPL CREATININE-BSD FRML MDRD: >60 ML/MIN/1.73
GLUCOSE BLD-MCNC: 104 MG/DL (ref 74–99)
HCT VFR BLD CALC: 39.3 % (ref 34–48)
HEMOGLOBIN: 12.4 G/DL (ref 11.5–15.5)
HUMAN METAPNEUMOVIRUS BY PCR: NOT DETECTED
HUMAN METAPNEUMOVIRUS BY PCR: NOT DETECTED
HUMAN RHINOVIRUS/ENTEROVIRUS BY PCR: DETECTED
HUMAN RHINOVIRUS/ENTEROVIRUS BY PCR: NOT DETECTED
IMMATURE GRANULOCYTES #: 0.01 E9/L
IMMATURE GRANULOCYTES %: 0.2 % (ref 0–5)
INFLUENZA A ANTIBODY: POSITIVE
INFLUENZA A H3 BY PCR: DETECTED
INFLUENZA A H3 BY PCR: DETECTED
INFLUENZA B ANTIBODY: NEGATIVE
INFLUENZA B BY PCR: NOT DETECTED
INFLUENZA B BY PCR: NOT DETECTED
LACTIC ACID, SEPSIS: 1 MMOL/L (ref 0.5–1.9)
LACTIC ACID: 1.3 MMOL/L (ref 0.5–2.2)
LYMPHOCYTES ABSOLUTE: 0.63 E9/L (ref 1.5–4)
LYMPHOCYTES RELATIVE PERCENT: 13.5 % (ref 20–42)
Lab: NORMAL
MCH RBC QN AUTO: 30 PG (ref 26–35)
MCHC RBC AUTO-ENTMCNC: 31.6 % (ref 32–34.5)
MCV RBC AUTO: 94.9 FL (ref 80–99.9)
MONOCYTES ABSOLUTE: 0.46 E9/L (ref 0.1–0.95)
MONOCYTES RELATIVE PERCENT: 9.9 % (ref 2–12)
MYCOPLASMA PNEUMONIAE BY PCR: NOT DETECTED
MYCOPLASMA PNEUMONIAE BY PCR: NOT DETECTED
NEUTROPHILS ABSOLUTE: 3.53 E9/L (ref 1.8–7.3)
NEUTROPHILS RELATIVE PERCENT: 75.6 % (ref 43–80)
PARAINFLUENZA VIRUS 1 BY PCR: NOT DETECTED
PARAINFLUENZA VIRUS 1 BY PCR: NOT DETECTED
PARAINFLUENZA VIRUS 2 BY PCR: NOT DETECTED
PARAINFLUENZA VIRUS 2 BY PCR: NOT DETECTED
PARAINFLUENZA VIRUS 3 BY PCR: NOT DETECTED
PARAINFLUENZA VIRUS 3 BY PCR: NOT DETECTED
PARAINFLUENZA VIRUS 4 BY PCR: NOT DETECTED
PARAINFLUENZA VIRUS 4 BY PCR: NOT DETECTED
PDW BLD-RTO: 13.2 FL (ref 11.5–15)
PERFORMING INSTRUMENT: NORMAL
PLATELET # BLD: 218 E9/L (ref 130–450)
PMV BLD AUTO: 8.9 FL (ref 7–12)
POTASSIUM SERPL-SCNC: 3.9 MMOL/L (ref 3.5–5)
PROCALCITONIN: 0.05 NG/ML (ref 0–0.08)
QC PASS/FAIL: NORMAL
RBC # BLD: 4.14 E12/L (ref 3.5–5.5)
RESPIRATORY SYNCYTIAL VIRUS BY PCR: NOT DETECTED
RESPIRATORY SYNCYTIAL VIRUS BY PCR: NOT DETECTED
SARS-COV-2, PCR: NOT DETECTED
SARS-COV-2, PCR: NOT DETECTED
SARS-COV-2, POC: NORMAL
SODIUM BLD-SCNC: 135 MMOL/L (ref 132–146)
TOTAL PROTEIN: 7.3 G/DL (ref 6.4–8.3)
TROPONIN, HIGH SENSITIVITY: <6 NG/L (ref 0–9)
WBC # BLD: 4.7 E9/L (ref 4.5–11.5)

## 2022-12-13 PROCEDURE — 36415 COLL VENOUS BLD VENIPUNCTURE: CPT

## 2022-12-13 PROCEDURE — 87426 SARSCOV CORONAVIRUS AG IA: CPT | Performed by: FAMILY MEDICINE

## 2022-12-13 PROCEDURE — 6370000000 HC RX 637 (ALT 250 FOR IP): Performed by: EMERGENCY MEDICINE

## 2022-12-13 PROCEDURE — 94640 AIRWAY INHALATION TREATMENT: CPT

## 2022-12-13 PROCEDURE — 96365 THER/PROPH/DIAG IV INF INIT: CPT

## 2022-12-13 PROCEDURE — 6360000002 HC RX W HCPCS: Performed by: FAMILY MEDICINE

## 2022-12-13 PROCEDURE — G0378 HOSPITAL OBSERVATION PER HR: HCPCS

## 2022-12-13 PROCEDURE — 1123F ACP DISCUSS/DSCN MKR DOCD: CPT | Performed by: FAMILY MEDICINE

## 2022-12-13 PROCEDURE — 85378 FIBRIN DEGRADE SEMIQUANT: CPT

## 2022-12-13 PROCEDURE — 94664 DEMO&/EVAL PT USE INHALER: CPT

## 2022-12-13 PROCEDURE — 80053 COMPREHEN METABOLIC PANEL: CPT

## 2022-12-13 PROCEDURE — 2580000003 HC RX 258: Performed by: FAMILY MEDICINE

## 2022-12-13 PROCEDURE — 83605 ASSAY OF LACTIC ACID: CPT

## 2022-12-13 PROCEDURE — 0202U NFCT DS 22 TRGT SARS-COV-2: CPT

## 2022-12-13 PROCEDURE — 71045 X-RAY EXAM CHEST 1 VIEW: CPT

## 2022-12-13 PROCEDURE — 99223 1ST HOSP IP/OBS HIGH 75: CPT | Performed by: NURSE PRACTITIONER

## 2022-12-13 PROCEDURE — 99215 OFFICE O/P EST HI 40 MIN: CPT | Performed by: FAMILY MEDICINE

## 2022-12-13 PROCEDURE — 84484 ASSAY OF TROPONIN QUANT: CPT

## 2022-12-13 PROCEDURE — 2500000003 HC RX 250 WO HCPCS: Performed by: FAMILY MEDICINE

## 2022-12-13 PROCEDURE — 6370000000 HC RX 637 (ALT 250 FOR IP): Performed by: FAMILY MEDICINE

## 2022-12-13 PROCEDURE — 99285 EMERGENCY DEPT VISIT HI MDM: CPT

## 2022-12-13 PROCEDURE — 87804 INFLUENZA ASSAY W/OPTIC: CPT | Performed by: FAMILY MEDICINE

## 2022-12-13 PROCEDURE — 85025 COMPLETE CBC W/AUTO DIFF WBC: CPT

## 2022-12-13 PROCEDURE — 87040 BLOOD CULTURE FOR BACTERIA: CPT

## 2022-12-13 PROCEDURE — 96374 THER/PROPH/DIAG INJ IV PUSH: CPT

## 2022-12-13 PROCEDURE — 6360000002 HC RX W HCPCS: Performed by: EMERGENCY MEDICINE

## 2022-12-13 PROCEDURE — 96375 TX/PRO/DX INJ NEW DRUG ADDON: CPT

## 2022-12-13 PROCEDURE — 93005 ELECTROCARDIOGRAM TRACING: CPT | Performed by: PHYSICIAN ASSISTANT

## 2022-12-13 PROCEDURE — 93010 ELECTROCARDIOGRAM REPORT: CPT | Performed by: INTERNAL MEDICINE

## 2022-12-13 PROCEDURE — 84145 PROCALCITONIN (PCT): CPT

## 2022-12-13 PROCEDURE — 96372 THER/PROPH/DIAG INJ SC/IM: CPT

## 2022-12-13 RX ORDER — ONDANSETRON 2 MG/ML
4 INJECTION INTRAMUSCULAR; INTRAVENOUS EVERY 6 HOURS PRN
Status: DISCONTINUED | OUTPATIENT
Start: 2022-12-13 | End: 2022-12-16 | Stop reason: HOSPADM

## 2022-12-13 RX ORDER — MULTIVITAMIN WITH IRON
1 TABLET ORAL DAILY
Status: DISCONTINUED | OUTPATIENT
Start: 2022-12-13 | End: 2022-12-16 | Stop reason: HOSPADM

## 2022-12-13 RX ORDER — HYDROCODONE BITARTRATE AND HOMATROPINE METHYLBROMIDE ORAL SOLUTION 5; 1.5 MG/5ML; MG/5ML
5 LIQUID ORAL EVERY 4 HOURS PRN
Status: DISCONTINUED | OUTPATIENT
Start: 2022-12-13 | End: 2022-12-14

## 2022-12-13 RX ORDER — IPRATROPIUM BROMIDE AND ALBUTEROL SULFATE 2.5; .5 MG/3ML; MG/3ML
3 SOLUTION RESPIRATORY (INHALATION) ONCE
Status: DISCONTINUED | OUTPATIENT
Start: 2022-12-13 | End: 2022-12-13

## 2022-12-13 RX ORDER — POLYETHYLENE GLYCOL 3350 17 G/17G
17 POWDER, FOR SOLUTION ORAL DAILY PRN
Status: DISCONTINUED | OUTPATIENT
Start: 2022-12-13 | End: 2022-12-16 | Stop reason: HOSPADM

## 2022-12-13 RX ORDER — ENOXAPARIN SODIUM 100 MG/ML
40 INJECTION SUBCUTANEOUS DAILY
Status: DISCONTINUED | OUTPATIENT
Start: 2022-12-13 | End: 2022-12-14

## 2022-12-13 RX ORDER — SODIUM CHLORIDE 9 MG/ML
INJECTION, SOLUTION INTRAVENOUS PRN
Status: DISCONTINUED | OUTPATIENT
Start: 2022-12-13 | End: 2022-12-16 | Stop reason: HOSPADM

## 2022-12-13 RX ORDER — ALBUTEROL SULFATE 2.5 MG/3ML
2.5 SOLUTION RESPIRATORY (INHALATION) 4 TIMES DAILY
Status: DISCONTINUED | OUTPATIENT
Start: 2022-12-13 | End: 2022-12-13

## 2022-12-13 RX ORDER — METHYLPREDNISOLONE SODIUM SUCCINATE 125 MG/2ML
125 INJECTION, POWDER, LYOPHILIZED, FOR SOLUTION INTRAMUSCULAR; INTRAVENOUS ONCE
Status: COMPLETED | OUTPATIENT
Start: 2022-12-13 | End: 2022-12-13

## 2022-12-13 RX ORDER — ACETAMINOPHEN 325 MG/1
650 TABLET ORAL EVERY 6 HOURS PRN
Status: DISCONTINUED | OUTPATIENT
Start: 2022-12-13 | End: 2022-12-16 | Stop reason: HOSPADM

## 2022-12-13 RX ORDER — ACETAMINOPHEN 650 MG/1
650 SUPPOSITORY RECTAL EVERY 6 HOURS PRN
Status: DISCONTINUED | OUTPATIENT
Start: 2022-12-13 | End: 2022-12-16 | Stop reason: HOSPADM

## 2022-12-13 RX ORDER — ONDANSETRON 4 MG/1
4 TABLET, ORALLY DISINTEGRATING ORAL EVERY 8 HOURS PRN
Status: DISCONTINUED | OUTPATIENT
Start: 2022-12-13 | End: 2022-12-16 | Stop reason: HOSPADM

## 2022-12-13 RX ORDER — VILAZODONE HYDROCHLORIDE 40 MG/1
40 TABLET ORAL DAILY
Status: DISCONTINUED | OUTPATIENT
Start: 2022-12-13 | End: 2022-12-16 | Stop reason: HOSPADM

## 2022-12-13 RX ORDER — SODIUM CHLORIDE 0.9 % (FLUSH) 0.9 %
5-40 SYRINGE (ML) INJECTION PRN
Status: DISCONTINUED | OUTPATIENT
Start: 2022-12-13 | End: 2022-12-16 | Stop reason: HOSPADM

## 2022-12-13 RX ORDER — SODIUM CHLORIDE 0.9 % (FLUSH) 0.9 %
5-40 SYRINGE (ML) INJECTION EVERY 12 HOURS SCHEDULED
Status: DISCONTINUED | OUTPATIENT
Start: 2022-12-13 | End: 2022-12-16 | Stop reason: HOSPADM

## 2022-12-13 RX ORDER — GUAIFENESIN 400 MG/1
400 TABLET ORAL 4 TIMES DAILY
Status: DISCONTINUED | OUTPATIENT
Start: 2022-12-13 | End: 2022-12-16 | Stop reason: HOSPADM

## 2022-12-13 RX ORDER — PREDNISONE 20 MG/1
40 TABLET ORAL DAILY
Status: DISCONTINUED | OUTPATIENT
Start: 2022-12-14 | End: 2022-12-16 | Stop reason: HOSPADM

## 2022-12-13 RX ORDER — BENZONATATE 100 MG/1
100 CAPSULE ORAL 3 TIMES DAILY PRN
Status: DISCONTINUED | OUTPATIENT
Start: 2022-12-13 | End: 2022-12-14

## 2022-12-13 RX ADMIN — MULTIVITAMIN TABLET 1 TABLET: TABLET at 20:14

## 2022-12-13 RX ADMIN — GUAIFENESIN 400 MG: 400 TABLET ORAL at 20:14

## 2022-12-13 RX ADMIN — Medication 10 ML: at 20:16

## 2022-12-13 RX ADMIN — GUAIFENESIN 400 MG: 400 TABLET ORAL at 16:51

## 2022-12-13 RX ADMIN — METHYLPREDNISOLONE SODIUM SUCCINATE 125 MG: 125 INJECTION, POWDER, FOR SOLUTION INTRAMUSCULAR; INTRAVENOUS at 14:01

## 2022-12-13 RX ADMIN — HYDROCODONE BITARTRATE AND HOMATROPINE METHYLBROMIDE 5 ML: 5; 1.5 SOLUTION ORAL at 19:01

## 2022-12-13 RX ADMIN — DOXYCYCLINE 100 MG: 100 INJECTION, POWDER, LYOPHILIZED, FOR SOLUTION INTRAVENOUS at 16:50

## 2022-12-13 RX ADMIN — WATER 1000 MG: 1 INJECTION INTRAMUSCULAR; INTRAVENOUS; SUBCUTANEOUS at 16:49

## 2022-12-13 RX ADMIN — ENOXAPARIN SODIUM 40 MG: 100 INJECTION SUBCUTANEOUS at 19:01

## 2022-12-13 RX ADMIN — IPRATROPIUM BROMIDE AND ALBUTEROL SULFATE 3 AMPULE: .5; 3 SOLUTION RESPIRATORY (INHALATION) at 15:40

## 2022-12-13 RX ADMIN — BENZONATATE 100 MG: 100 CAPSULE ORAL at 19:01

## 2022-12-13 ASSESSMENT — ENCOUNTER SYMPTOMS
NAUSEA: 0
WHEEZING: 0
VOMITING: 0
ABDOMINAL PAIN: 0
CONSTIPATION: 0
DIARRHEA: 0

## 2022-12-13 NOTE — ED PROVIDER NOTES
Department of Emergency Medicine   ED  Provider Note  Admit Date/RoomTime: 12/13/2022 12:56 PM  ED Room: 27/27          History of Present Illness:  12/13/22, Time: 1:08 PM EST  Chief Complaint   Patient presents with    Shortness of Breath     Dx with flu a today. 86% RA in triage                 Antonio Patient is a 74 y.o. female presenting to the ED for sob, beginning a few days ago, worse today. The complaint has been persistent, moderate in severity, and worsened by light exertion. Patient presents with shortness of breath and hypoxia. History significant for previous infection of MAC, states has been off antibiotics for the last 2 years due to going to renal failure requiring dialysis. Does have supple oxygen at home, normally only wears it at night however has been using it continuously for the last several days. Complains of shortness of breath on light exertion. Denies hemoptysis nausea vomiting. Does have previous history of venous thromboembolism, he is not on oral anticoagulation at baseline. She reports several days ago she started to feel worsening shortness of breath, she tested positive for influenza A, saw her PCP today, while on her oxygen she was hypoxic at 89% and tachycardic at 120s. She was directed to the emergency department for further care and work-up. States she is not been on steroids in quite some time, has been using her breathing medicines with minimal relief    Review of Systems:   Pertinent positives and negatives are stated within HPI, all other systems reviewed and are negative.        --------------------------------------------- PAST HISTORY ---------------------------------------------  Past Medical History:  has a past medical history of Bronchiectasis ([facility] [address] COPD (chronic obstructive pulmonary disease) ([facility] Depression, Hx of blood clots, and Mycobacterium avium complex ([facility]     Past Surgical History:  has a past surgical history that includes bronchoscopy (N/A, 12/30/2019) and bronchoscopy (N/A, 6/14/2022). Social History:  reports that she quit smoking about 24 years ago. Her smoking use included cigarettes. She started smoking about 45 years ago. She has a 20.00 pack-year smoking history. She has never used smokeless tobacco. She reports that she does not drink alcohol and does not use drugs. Family History: family history includes No Known Problems in her father and mother. . Unless otherwise noted, family history is non contributory    The patients home medications have been reviewed. Allergies: Rifampin        ---------------------------------------------------PHYSICAL EXAM--------------------------------------    Constitutional/General: Alert and oriented x3  Head: Normocephalic and atraumatic  Eyes: PERRL, EOMI, sclera non icteric  Mouth: Oropharynx clear, handling secretions, no trismus, no asymmetry of the posterior oropharynx or uvular edema  Neck: Supple, full ROM, no stridor, no meningeal signs mild JVD  Respiratory: Lungs diminished throughout, harsh cough, significantly diminished at the right bases with wheezing and crackles throughout not in respiratory distress  Cardiovascular: Tachycardic and regular 2+ distal pulses. Equal extremity pulses. Chest: No chest wall tenderness  GI:  Abdomen Soft, Non tender, Non distended. No rebound, guarding, or rigidity. Musculoskeletal: Moves all extremities x 4. Warm and well perfused,   Capillary refill <3 seconds  Integument: skin warm and dry. No rashes. Neurologic: GCS 15, no focal deficits,    Psychiatric: Normal Affect      EKG: Interpreted by emergency department physician, Dr. Desirae Weir   This EKG is signed and interpreted by me. Rate: 82  Rhythm: Sinus  Interpretation:  This is sinus rhythm, left axis, left bundle branch block, PA is 140, QRS is 124, QTc is 464, nonspecific T changes that are stable c/w 1/5/21  Comparison: stable as compared to patient's most recent EKG      -------------------------------------------------- RESULTS -------------------------------------------------  I have personally reviewed all laboratory and imaging results for this patient. Results are listed below.      LABS: (Lab results interpreted by me)  Results for orders placed or performed during the hospital encounter of 12/13/22   CBC with Auto Differential   Result Value Ref Range    WBC 4.7 4.5 - 11.5 E9/L    RBC 4.14 3.50 - 5.50 E12/L    Hemoglobin 12.4 11.5 - 15.5 g/dL    Hematocrit 39.3 34.0 - 48.0 %    MCV 94.9 80.0 - 99.9 fL    MCH 30.0 26.0 - 35.0 pg    MCHC 31.6 (L) 32.0 - 34.5 %    RDW 13.2 11.5 - 15.0 fL    Platelets 559 102 - 625 E9/L    MPV 8.9 7.0 - 12.0 fL    Neutrophils % 75.6 43.0 - 80.0 %    Immature Granulocytes % 0.2 0.0 - 5.0 %    Lymphocytes % 13.5 (L) 20.0 - 42.0 %    Monocytes % 9.9 2.0 - 12.0 %    Eosinophils % 0.2 0.0 - 6.0 %    Basophils % 0.6 0.0 - 2.0 %    Neutrophils Absolute 3.53 1.80 - 7.30 E9/L    Immature Granulocytes # 0.01 E9/L    Lymphocytes Absolute 0.63 (L) 1.50 - 4.00 E9/L    Monocytes Absolute 0.46 0.10 - 0.95 E9/L    Eosinophils Absolute 0.01 (L) 0.05 - 0.50 E9/L    Basophils Absolute 0.03 0.00 - 0.20 E9/L   Comprehensive Metabolic Panel   Result Value Ref Range    Sodium 135 132 - 146 mmol/L    Potassium 3.9 3.5 - 5.0 mmol/L    Chloride 97 (L) 98 - 107 mmol/L    CO2 29 22 - 29 mmol/L    Anion Gap 9 7 - 16 mmol/L    Glucose 104 (H) 74 - 99 mg/dL    BUN 14 6 - 23 mg/dL    Creatinine 0.9 0.5 - 1.0 mg/dL    Est, Glom Filt Rate >60 >=60 mL/min/1.73    Calcium 9.1 8.6 - 10.2 mg/dL    Total Protein 7.3 6.4 - 8.3 g/dL    Albumin 3.9 3.5 - 5.2 g/dL    Total Bilirubin <0.2 0.0 - 1.2 mg/dL    Alkaline Phosphatase 64 35 - 104 U/L    ALT 12 0 - 32 U/L    AST 22 0 - 31 U/L   Troponin   Result Value Ref Range    Troponin, High Sensitivity <6 0 - 9 ng/L   Lactate, Sepsis   Result Value Ref Range    Lactic Acid, Sepsis 1.0 0.5 - 1.9 mmol/L   D-Dimer, Quantitative   Result Value Ref Range    D-Dimer, Quant <200 ng/mL DDU   ,       RADIOLOGY:  Interpreted by Radiologist unless otherwise specified  XR CHEST PORTABLE   Final Result   Stable right lung opacities unchanged compared to 08/24/2022.                          ------------------------- NURSING NOTES AND VITALS REVIEWED ---------------------------   The nursing notes within the ED encounter and vital signs as below have been reviewed by myself  BP (!) 155/101   Pulse 90   Temp 97.1 °F (36.2 °C)   Resp 22   LMP  (LMP Unknown)   SpO2 (!) 86%     Oxygen Saturation Interpretation: Abnormal    The cardiac monitor revealed NSR with a heart rate in the 90s as interpreted by me. The cardiac monitor was ordered secondary to the patient's heart rate and to monitor the patient for dysrhythmia. CPT 32697    The patients available past medical records and past encounters were reviewed.         ------------------------------ ED COURSE/MEDICAL DECISION MAKING----------------------  Medications   cefTRIAXone (ROCEPHIN) 1,000 mg in sterile water 10 mL IV syringe (has no administration in time range)   doxycycline (VIBRAMYCIN) 100 mg in dextrose 5 % 100 mL IVPB (has no administration in time range)   albuterol (PROVENTIL) nebulizer solution 2.5 mg (2.5 mg Nebulization Not Given 12/13/22 1529)   guaiFENesin tablet 400 mg (has no administration in time range)   vilazodone HCl (VIIBRYD) TABS 40 mg (has no administration in time range)   multivitamin 1 tablet (has no administration in time range)   methylPREDNISolone sodium (SOLU-MEDROL) injection 125 mg (125 mg IntraVENous Given 12/13/22 1401)                    Medical Decision Making:     I, Dr. Tracey Che am the primary provider of record    Patient presents with acute on chronic respiratory failure with hypoxia, is requiring increased supplemental oxygen, normally does not need it during the day now hypoxic on 2 L with light exertion, influenza A positive at the office today, film array was sent she was placed in isolation, breathing medications steroids ordered, I did speak with the patient's pulmonologist who will consult pulmonology here, spoke with medicine patient kept for further care      Re-Evaluations:          Re-evaluation. Patients symptoms show no change  Repeat physical examination is not changed        This patient's ED course included: a personal history and physicial examination, re-evaluation prior to disposition, IV medications, cardiac monitoring, continuous pulse oximetry, and complex medical decision making and emergency management    This patient has been closely monitored during their ED course. Consultations:  Spoke with Dr. Hurt (patient's pulmonologist). Discussed case. They will reach out to local pulmonology at our facility for consultation. Spoke with Dr. Teresa Bashri (Medicine). Discussed case. They will admit this patient. Counseling: The emergency provider has spoken with the patient and discussed todays results, in addition to providing specific details for the plan of care and counseling regarding the diagnosis and prognosis. Questions are answered at this time and they are agreeable with the plan.       --------------------------------- IMPRESSION AND DISPOSITION ---------------------------------    IMPRESSION  1. Acute on chronic respiratory failure with hypoxia (HCC)    2. Influenza A    3. [facility]        DISPOSITION  Disposition: Admit  Patient condition is stable        NOTE: This report was transcribed using voice recognition software.  Every effort was made to ensure accuracy; however, inadvertent computerized transcription errors may be present        Lela Vazquez DO  12/13/22 2659

## 2022-12-13 NOTE — LETTER
[facility] [facility]  [facility] [address]  Phone: [phone]        December 16, 2022     Patient: Jhony Kong   YOB: 1955   Date of Visit: 12/13/2022       To Whom it May Concern:     Jhony Kong was hospitalized from 12/13/2022-12/16/2022

## 2022-12-13 NOTE — CONSULTS
Pulmonary Consultation    Admit Date: 12/13/2022  Requesting Physician: Kan Rouse MD    CC:  Pneumonia  HPI:  74years old lady well-known to Dr. Rosy Rubin due to history of bronchiectasis and prior MAC infection, presenting now with progressive history of shortness of breath, productive cough and chills for the last 5 days. Went to visit her PCP today and found to have hypoxemia with pulse ox in the mid 80s while on room air. The decision was to transfer immediately to the emergency department for further evaluation. In ER the patient was found to have influenza A , received 1 dose of bronchodilators and IV steroids chest x-ray performed and pulmonary consult requested. The patient has history of noncompliance with prior pulmonary treatments including bronchodilators, and chest physiotherapy. Off MAC treatment for at least 2 years due to complications associated with kidney insufficiency, at this time not interested to restart medications. PMH:    Past Medical History:   Diagnosis Date    Bronchiectasis ([facility] [address]     COPD (chronic obstructive pulmonary disease) ([facility] [address]     Depression     Hx of blood clots     lung    Mycobacterium avium complex (HCC)      PSH:   Past Surgical History:   Procedure Laterality Date    BRONCHOSCOPY N/A 12/30/2019    BRONCHOSCOPY ALVEOLAR LAVAGE performed by Siobhan Matos MD at [address] [facility] [address] N/A 6/14/2022    BRONCHOSCOPY DIAGNOSTIC OR CELL [address] ONLY-BAL performed by Juhi Faustin DO at [address] [facility] ENDOSCOPY        Allergies, rifampin. Respiratory ROS: positive for - cough, shortness of breath, sputum changes, and wheezing Otherwise, a complete review of systems is undertaken and is negative.     Social History:  Alcohol:   Social drinker  Tobacco: none  Employment: no silica or asbestos exposure  Medications:       ipratropium-albuterol  3 ampule Inhalation Once         Vitals:  Tmax:  VITALS:  BP (!) 155/101   Pulse 90   Temp 97.1 °F (36.2 °C)   Resp 22 LMP  (LMP Unknown)   SpO2 (!) 86%   24HR INTAKE/OUTPUT:  No intake or output data in the 24 hours ending 22 1502  CURRENT PULSE OXIMETRY:  SpO2: (!) 86 %  24HR PULSE OXIMETRY RANGE:  SpO2  Av %  Min: 86 %  Max: 92 %         EXAM:  General: No distress. Alert. Chronically ill, thin built. Eyes: PERRL. No sclera icterus. No conjunctival injection. ENT: No discharge. Pharynx clear. Neck: Trachea midline. Normal thyroid. Resp: No accessory muscle use. Bilateral crackles, right more than left. Bilateral rhonchi. CV: Regular rate. Regular rhythm. No mumur or rub. ABD: Non-tender. Non-distended. No masses. No organmegaly. Normal bowel sounds. Skin: Warm and dry. No nodule on exposed extremities. No rash on exposed extremities. Lymph: No cervical LAD. No supraclavicular LAD. Ext: No joint deformity. No clubbing. No cyanosis. No edema  Neuro: Awake. Follows commands. Positive pupils/gag/corneals. Normal pain response. Lab Results:  CBC:   Recent Labs     22  1317   WBC 4.7   HGB 12.4   HCT 39.3   MCV 94.9        BMP:   Recent Labs     22  1317      K 3.9   CL 97*   CO2 29   BUN 14   CREATININE 0.9   Influenza A+   ALB:3,BILIDIR:3,BILITOT:3,ALKPHOS:3)@  PT/INR: No results for input(s): PROTIME, INR in the last 72 hours. Cultures:  -    ABG: noted  Films:  CXR : Increased right lung infiltrates when compared with prior films      Assessment:  Influenza A infection  Concern with pneumonia, (increased R Lung infiltrates) vs recurrent MAC infection  History of bronchiectasis  History of MAC infection, off antibiotics      Plan:  Tamiflu for 5 days  Bronchodilators and Mucinex to enhance expectoration  Systemic steroids for 5 to 7 days  Chest physiotherapy with flutter valve  To start Rocephin + Doxy  Calcitonin and sputum to follow  Wean fio2 to keep spo2 > 90%  Known to Dr Roberta Freeman. Thanks for letting us see this patient in consultation.   Please contact us with any questions.     Hamida Mancilla MD,  MVANNESA., F.C.C.P.                    pulp supra

## 2022-12-13 NOTE — H&P
[facility]   History and Physical      CHIEF COMPLAINT: Increased shortness of breath/cough/chills    History of Present Illness:  74 y.o. female with a history of bronchiectasis, COPD, depression, blood clots, Mycobacterium AVM complex presents with worsening shortness of breath, cough, chills x5 days. Patient complaint is moderate in severity, persistent, worsened with exertion. Previous MAC. Off antibiotic therapy for at least 2 years. Does chronically wear 2L NC at at bedtime. With increased shortness of breath has been using throughout the day. Presented to PCP. She was found to be hypoxic with SPO2 mid 80s on 2L NC. Has been off treatment for at least 2 years 2/2 complications with renal insufficiency. States she was placed on hemodialysis at one time. She was transferred to ED for further evaluation. Patient states she has poor appetite and significantly sore throat. Patient denies CP, N/V/D, fevers. States did have some productive cough prior to presentation, however has been having difficulty bringing anything up now. BUN/Crt 14/0.9. H/H 12.4/39.3. WBC 4.7. Blood cultures obtained and pending. Influenza A+. CXR stable right lung opacities unchanged compared to 8/24/2022. Received albuterol 2.5 mg INH x1/ceftriaxone 1G IV x1/doxycycline 100 mg IV x1/DuoNeb x1/Solu-Medrol 125 mg IV x1 in ED course. Pulmonology has been consulted decision to admit patient for further evaluation and treatment    Informant(s) for H&P:Pt and EMR     REVIEW OF SYSTEMS:  Admits to cough/chills/worsening shortness of breath. Denies  fevers, cp,  n/v, ha, vision/hearing changes, wt changes, hot/cold flashes, other open skin lesions, diarrhea, constipation, dysuria/hematuria unless noted in HPI. Complete ROS performed with the patient and is otherwise negative.       PMH:  Past Medical History:   Diagnosis Date    Bronchiectasis ([address]     COPD (chronic obstructive pulmonary disease) ([facility] [address]     Depression     Hx of blood clots     lung    Mycobacterium avium complex [facility])        Surgical History:  Past Surgical History:   Procedure Laterality Date    BRONCHOSCOPY N/A 12/30/2019    BRONCHOSCOPY ALVEOLAR LAVAGE performed by Irene Strickland MD at [address] [facility] N/A 6/14/2022    BRONCHOSCOPY DIAGNOSTIC OR CELL [facility] ONLY-BAL performed by Dipika Ramachandran DO at [facility] ENDOSCOPY       Medications Prior to Admission:    Prior to Admission medications    Medication Sig Start Date End Date Taking? Authorizing Provider   albuterol sulfate HFA (PROVENTIL;VENTOLIN;PROAIR) 108 (90 Base) MCG/ACT inhaler INHALE 2 PUFFS INTO THE LUNGS EVERY 6-8 HOURS AS NEEDED FOR WHEEZING 11/8/22   Juliana Diaz MD   acetaminophen (TYLENOL) 500 MG tablet Take 500 mg by mouth daily as needed for Pain    Historical Provider, MD   Multiple Vitamins-Minerals (ALIVE MULTI-VITAMIN PO) Take 1 tablet by mouth daily    Historical Provider, MD   Multiple Vitamins-Minerals (HAIR SKIN AND NAILS FORMULA PO) Take 1 tablet by mouth daily    Historical Provider, MD   vilazodone HCl (VIIBRYD) 40 MG TABS Take 40 mg by mouth daily    Historical Provider, MD       Allergies:    Rifampin    Social History:    reports that she quit smoking about 24 years ago. Her smoking use included cigarettes. She started smoking about 45 years ago. She has a 20.00 pack-year smoking history. She has never used smokeless tobacco. She reports that she does not drink alcohol and does not use drugs. Family History:   family history includes No Known Problems in her father and mother.   Reviewed and updated with patient    PHYSICAL EXAM:  Vitals:  BP (!) 155/101   Pulse 90   Temp 97.1 °F (36.2 °C)   Resp 22   LMP  (LMP Unknown)   SpO2 (!) 86%   General Appearance: alert and oriented to person, place and time and in no acute distress  Skin: warm and dry  Head: normocephalic and atraumatic  Eyes: pupils equal, round, and reactive to light, extraocular eye movements intact, conjunctivae normal  Neck: neck supple and non tender without mass   Pulmonary/Chest: clear to auscultation bilaterally- no wheezes, rales or rhonchi, normal air movement, no respiratory distress  Cardiovascular: normal rate, normal S1 and S2 and no RGM  Abdomen: soft, non-tender, non-distended, normal bowel sounds, no masses or organomegaly  Extremities: no cyanosis, no clubbing and no edema  Neurologic: no cranial nerve deficit and speech normal    LABS:  Recent Labs     12/13/22  1317      K 3.9   CL 97*   CO2 29   BUN 14   CREATININE 0.9   GLUCOSE 104*   CALCIUM 9.1       Recent Labs     12/13/22  1317   WBC 4.7   RBC 4.14   HGB 12.4   HCT 39.3   MCV 94.9   MCH 30.0   MCHC 31.6*   RDW 13.2      MPV 8.9       No results for input(s): POCGLU in the last 72 hours. Radiology: XR CHEST PORTABLE    Result Date: 12/13/2022  EXAMINATION: ONE XRAY VIEW OF THE CHEST 12/13/2022 2:06 pm COMPARISON: None. 08/24/2022 HISTORY: ORDERING SYSTEM PROVIDED HISTORY: Hypoxia, Hx of MAC TECHNOLOGIST PROVIDED HISTORY: Reason for exam:->Hypoxia, Hx of MAC FINDINGS: Stable right lung opacities. There is no effusion or pneumothorax. The cardiomediastinal silhouette is without acute process. The osseous structures are without acute process. Stable right lung opacities unchanged compared to 08/24/2022. EKG: None    ASSESSMENT:      Active Problems:    * No active hospital problems. *  Resolved Problems:    * No resolved hospital problems. *      PLAN:    Acute on chronic respiratory failure with hypoxia-symptom onset approximately 5 days ago. Presented to PCP found hypoxic on 2L NC. Chronically wears 2L NC at at bedtime, however has been wearing throughout the day. CXR stable right lung opacities unchanged compared to 8/24/2022. Concerning for pneumonia. Ceftriaxone/doxycycline initiated. Sputum culture. Urine antigens/procalcitonin pending.  Currently requiring 4LNC .  Continue wean as tolerated maintain SPO2>90%. Pulmonology input appreciated. Pneumonia/recurrent MAC infection? -CXR concerning for pneumonia. Ceftriaxone/doxycycline initiated. Sputum culture pending. Procalcitonin/urine antigens pending. Mucolytic. Flutter valve. Pulmonology input appreciated. Influenza A-symptom onset approximately 5 days ago. SOB/cough/chills. Influenza A+. Begin Tamiflu x5 days. Steroids. Isolation as per protocol. Supportive care. Hx Mycobacterium AVM complex-has not received treatment for at least 2 years 2/2 renal insufficiency. Depression-continue Viibryd. Code Status: Full  DVT prophylaxis: Lovenox    NOTE: This report was transcribed using voice recognition software. Every effort was made to ensure accuracy; however, inadvertent computerized transcription errors may be present. In Review of EMR,  evaluation, management and diagnosis. Care plan has been discussed with attending. Time spent 41   minutes. Electronically signed by Jhoan Merida CNP on 12/13/2022 at 3:52 PM

## 2022-12-13 NOTE — PROGRESS NOTES
[facility])  Family Medicine Outpatient        SUBJECTIVE:  CC: had concerns including Shortness of Breath (Pt c/o of sob for the past since Thursday. Pt states o2 levels have been dropping to low 80's.). HPI:  Lani Stone is a female 74 y.o. presented to the clinic for concerns for increased sob since last Thursday. She has been using Zinc, Tylenol, and tried Albuterol and Duoneb, but doesn't feel like they help. She reports her oxygen has been dipping into the 80s. She has a history of MAC and has not been willing to have repeat treatment with a known active infection. She was last seen in August of this year. She has a history of a septic emboli in her 45s. Review of Systems   Constitutional:  Negative for appetite change, fatigue and fever. Respiratory:  Positive for cough and shortness of breath. Negative for wheezing. Hypoxia   Cardiovascular:  Negative for chest pain and palpitations. Gastrointestinal:  Negative for abdominal pain, constipation, diarrhea, nausea and vomiting. Outpatient Medications Marked as Taking for the 12/13/22 encounter (Office Visit) with Afia Pang MD   Medication Sig Dispense Refill    albuterol sulfate HFA (PROVENTIL;VENTOLIN;PROAIR) 108 (90 Base) MCG/ACT inhaler INHALE 2 PUFFS INTO THE LUNGS EVERY 6-8 HOURS AS NEEDED FOR WHEEZING 6.7 each 0    acetaminophen (TYLENOL) 500 MG tablet Take 500 mg by mouth daily as needed for Pain      Multiple Vitamins-Minerals (ALIVE MULTI-VITAMIN PO) Take 1 tablet by mouth daily      Multiple Vitamins-Minerals (HAIR SKIN AND NAILS FORMULA PO) Take 1 tablet by mouth daily      vilazodone HCl (VIIBRYD) 40 MG TABS Take 40 mg by mouth daily         I have reviewed all pertinent PMHx, PSHx, FamHx, SocialHx, medications, and allergies and updated history as appropriate.     OBJECTIVE    VS: /84   Pulse 100   Temp 99.3 °F (37.4 °C)   Resp 16   Ht 5' 2\" (1.575 m)   Wt 105 lb (47.6 kg)   LMP  (LMP Unknown)   SpO2 92%   BMI 19.20 kg/m²   Physical Exam  Constitutional:       General: She is not in acute distress. Appearance: She is well-developed. She is ill-appearing. She is not diaphoretic. HENT:      Head: Normocephalic and atraumatic. Eyes:      Conjunctiva/sclera: Conjunctivae normal.      Pupils: Pupils are equal, round, and reactive to light. Cardiovascular:      Rate and Rhythm: Normal rate and regular rhythm. Pulmonary:      Effort: Pulmonary effort is normal.      Comments: Tachypneic. Diminished lung sounds throughout. Crackles on the right. Walking test attempted. She walked for 3 minutes and became tachycardic and hypoxic with hr 121 and O2 89%. Coughing. Abdominal:      General: Bowel sounds are normal. There is no distension. Palpations: Abdomen is soft. Tenderness: There is no abdominal tenderness. Hernia: No hernia is present. Musculoskeletal:      Cervical back: Normal range of motion and neck supple. Skin:     General: Skin is warm and dry. Neurological:      Mental Status: She is alert and oriented to person, place, and time. ASSESSMENT/PLAN:  1. Influenza A  Negative covid, but positive influenza with progressive sob with hypoxia on ambulation and thickening mucus with concerns of acute pneumonia with known MAC infection. Declines breathing treatment in the office. Patient advised for need to go to the ED at this time for acute work up and treatment as needed. Declines ambulance AMA, but her son will take her now. Missed the past 2 days of work. Report given to ED in [facility], as well as her Pulmonologist, Dr. Meron Ames.   - POCT Influenza A/B  - POCT COVID-19, Antigen    2. Mycobacterium avium complex ([facility] [address]  Untreated secondary to patient refusal. Hx of Dialysis on Rifampin    3. Septic embolism ([facility] [address]  Historic.      I have reviewed my findings and recommendations with Giulia Badillo MD  12/21/2022 2:47 PM  Return for after hospitalization & medicare wellness in 4 weeks. .     Counseled regarding above diagnosis, including possible risks and complications, especially if left uncontrolled. Patient counseled on red flag symptoms and if they occur to go to the ED. Discussed medications risk/benefits and possible side effects and alternatives to treatment. Patient and/or guardian verbalizes understanding, agrees, feels comfortable with and wishes to proceed with above treatment plan. Advised patient regarding importance of keeping up with recommended health maintenance and to schedule as soon as possible if overdue, as this is important in assessing for undiagnosed pathology, especially cancer, as well as protecting against potentially harmful/life threatening disease. Patient and/or guardian verbalizes understanding and agrees with above counseling, assessment and plan. All questions answered. Please note this report has been partially produced using speech recognition software  and may contain errors related to that system including grammar, punctuation and spelling as well as words and phrases that may seem inappropriate. If there are questions or concerns please feel free to contact me to clarify.

## 2022-12-13 NOTE — PROGRESS NOTES
Admission database completed to best of this RN's ability. Pharmacy tech to review medication list. Care plan and education initiated. Pt independent from home alone. Wears 2-3 L n/c HS through [employer]. Denies any assistive devices with ambulation. Denies any C services PTA.

## 2022-12-14 LAB
ALBUMIN SERPL-MCNC: 3.7 G/DL (ref 3.5–5.2)
ALP BLD-CCNC: 62 U/L (ref 35–104)
ALT SERPL-CCNC: 13 U/L (ref 0–32)
ANION GAP SERPL CALCULATED.3IONS-SCNC: 9 MMOL/L (ref 7–16)
AST SERPL-CCNC: 23 U/L (ref 0–31)
BASOPHILS ABSOLUTE: 0 E9/L (ref 0–0.2)
BASOPHILS RELATIVE PERCENT: 0 % (ref 0–2)
BILIRUB SERPL-MCNC: <0.2 MG/DL (ref 0–1.2)
BUN BLDV-MCNC: 17 MG/DL (ref 6–23)
CALCIUM SERPL-MCNC: 9.4 MG/DL (ref 8.6–10.2)
CHLORIDE BLD-SCNC: 97 MMOL/L (ref 98–107)
CO2: 29 MMOL/L (ref 22–29)
CREAT SERPL-MCNC: 1 MG/DL (ref 0.5–1)
EOSINOPHILS ABSOLUTE: 0 E9/L (ref 0.05–0.5)
EOSINOPHILS RELATIVE PERCENT: 0 % (ref 0–6)
GFR SERPL CREATININE-BSD FRML MDRD: >60 ML/MIN/1.73
GLUCOSE BLD-MCNC: 122 MG/DL (ref 74–99)
HCT VFR BLD CALC: 39.9 % (ref 34–48)
HEMOGLOBIN: 12.2 G/DL (ref 11.5–15.5)
IMMATURE GRANULOCYTES #: 0.01 E9/L
IMMATURE GRANULOCYTES %: 0.3 % (ref 0–5)
L. PNEUMOPHILA SEROGP 1 UR AG: NORMAL
LYMPHOCYTES ABSOLUTE: 0.86 E9/L (ref 1.5–4)
LYMPHOCYTES RELATIVE PERCENT: 21.5 % (ref 20–42)
MCH RBC QN AUTO: 28.8 PG (ref 26–35)
MCHC RBC AUTO-ENTMCNC: 30.6 % (ref 32–34.5)
MCV RBC AUTO: 94.1 FL (ref 80–99.9)
MONOCYTES ABSOLUTE: 0.38 E9/L (ref 0.1–0.95)
MONOCYTES RELATIVE PERCENT: 9.5 % (ref 2–12)
NEUTROPHILS ABSOLUTE: 2.75 E9/L (ref 1.8–7.3)
NEUTROPHILS RELATIVE PERCENT: 68.7 % (ref 43–80)
PDW BLD-RTO: 13.4 FL (ref 11.5–15)
PLATELET # BLD: 204 E9/L (ref 130–450)
PMV BLD AUTO: 9.2 FL (ref 7–12)
POTASSIUM REFLEX MAGNESIUM: 4.6 MMOL/L (ref 3.5–5)
RBC # BLD: 4.24 E12/L (ref 3.5–5.5)
SODIUM BLD-SCNC: 135 MMOL/L (ref 132–146)
STREP PNEUMONIAE ANTIGEN, URINE: NORMAL
TOTAL PROTEIN: 7.1 G/DL (ref 6.4–8.3)
WBC # BLD: 4 E9/L (ref 4.5–11.5)

## 2022-12-14 PROCEDURE — 96366 THER/PROPH/DIAG IV INF ADDON: CPT

## 2022-12-14 PROCEDURE — 96376 TX/PRO/DX INJ SAME DRUG ADON: CPT

## 2022-12-14 PROCEDURE — 99232 SBSQ HOSP IP/OBS MODERATE 35: CPT | Performed by: INTERNAL MEDICINE

## 2022-12-14 PROCEDURE — 6360000002 HC RX W HCPCS: Performed by: FAMILY MEDICINE

## 2022-12-14 PROCEDURE — 2580000003 HC RX 258: Performed by: FAMILY MEDICINE

## 2022-12-14 PROCEDURE — 6370000000 HC RX 637 (ALT 250 FOR IP)

## 2022-12-14 PROCEDURE — 80053 COMPREHEN METABOLIC PANEL: CPT

## 2022-12-14 PROCEDURE — 94640 AIRWAY INHALATION TREATMENT: CPT

## 2022-12-14 PROCEDURE — G0378 HOSPITAL OBSERVATION PER HR: HCPCS

## 2022-12-14 PROCEDURE — 6360000002 HC RX W HCPCS: Performed by: INTERNAL MEDICINE

## 2022-12-14 PROCEDURE — 96372 THER/PROPH/DIAG INJ SC/IM: CPT

## 2022-12-14 PROCEDURE — 85025 COMPLETE CBC W/AUTO DIFF WBC: CPT

## 2022-12-14 PROCEDURE — 2500000003 HC RX 250 WO HCPCS: Performed by: FAMILY MEDICINE

## 2022-12-14 PROCEDURE — 87449 NOS EACH ORGANISM AG IA: CPT

## 2022-12-14 PROCEDURE — 2700000000 HC OXYGEN THERAPY PER DAY

## 2022-12-14 PROCEDURE — 6370000000 HC RX 637 (ALT 250 FOR IP): Performed by: FAMILY MEDICINE

## 2022-12-14 PROCEDURE — 36415 COLL VENOUS BLD VENIPUNCTURE: CPT

## 2022-12-14 RX ORDER — HYDROCODONE BITARTRATE AND HOMATROPINE METHYLBROMIDE ORAL SOLUTION 5; 1.5 MG/5ML; MG/5ML
10 LIQUID ORAL EVERY 4 HOURS PRN
Status: DISCONTINUED | OUTPATIENT
Start: 2022-12-14 | End: 2022-12-16 | Stop reason: HOSPADM

## 2022-12-14 RX ORDER — ALBUTEROL SULFATE 2.5 MG/3ML
2.5 SOLUTION RESPIRATORY (INHALATION) 4 TIMES DAILY
Status: DISCONTINUED | OUTPATIENT
Start: 2022-12-14 | End: 2022-12-15

## 2022-12-14 RX ORDER — BENZONATATE 100 MG/1
200 CAPSULE ORAL 3 TIMES DAILY PRN
Status: DISCONTINUED | OUTPATIENT
Start: 2022-12-14 | End: 2022-12-16 | Stop reason: HOSPADM

## 2022-12-14 RX ORDER — BENZONATATE 100 MG/1
100 CAPSULE ORAL 3 TIMES DAILY
Status: DISCONTINUED | OUTPATIENT
Start: 2022-12-14 | End: 2022-12-15

## 2022-12-14 RX ORDER — ENOXAPARIN SODIUM 100 MG/ML
30 INJECTION SUBCUTANEOUS DAILY
Status: DISCONTINUED | OUTPATIENT
Start: 2022-12-14 | End: 2022-12-16 | Stop reason: HOSPADM

## 2022-12-14 RX ADMIN — PREDNISONE 40 MG: 20 TABLET ORAL at 08:25

## 2022-12-14 RX ADMIN — DOXYCYCLINE 100 MG: 100 INJECTION, POWDER, LYOPHILIZED, FOR SOLUTION INTRAVENOUS at 17:29

## 2022-12-14 RX ADMIN — GUAIFENESIN 400 MG: 400 TABLET ORAL at 08:25

## 2022-12-14 RX ADMIN — GUAIFENESIN 400 MG: 400 TABLET ORAL at 17:26

## 2022-12-14 RX ADMIN — Medication 10 ML: at 21:30

## 2022-12-14 RX ADMIN — VILAZODONE HYDROCHLORIDE 40 MG: 40 TABLET ORAL at 08:24

## 2022-12-14 RX ADMIN — MULTIVITAMIN TABLET 1 TABLET: TABLET at 08:25

## 2022-12-14 RX ADMIN — DOXYCYCLINE 100 MG: 100 INJECTION, POWDER, LYOPHILIZED, FOR SOLUTION INTRAVENOUS at 03:26

## 2022-12-14 RX ADMIN — HYDROCODONE BITARTRATE AND HOMATROPINE METHYLBROMIDE 10 ML: 5; 1.5 SOLUTION ORAL at 17:26

## 2022-12-14 RX ADMIN — GUAIFENESIN 400 MG: 400 TABLET ORAL at 14:00

## 2022-12-14 RX ADMIN — ENOXAPARIN SODIUM 30 MG: 100 INJECTION SUBCUTANEOUS at 08:24

## 2022-12-14 RX ADMIN — WATER 1000 MG: 1 INJECTION INTRAMUSCULAR; INTRAVENOUS; SUBCUTANEOUS at 17:20

## 2022-12-14 RX ADMIN — BENZONATATE 100 MG: 100 CAPSULE ORAL at 03:28

## 2022-12-14 RX ADMIN — BENZONATATE 100 MG: 100 CAPSULE ORAL at 21:29

## 2022-12-14 RX ADMIN — GUAIFENESIN 400 MG: 400 TABLET ORAL at 21:29

## 2022-12-14 RX ADMIN — BENZONATATE 100 MG: 100 CAPSULE ORAL at 17:26

## 2022-12-14 RX ADMIN — SODIUM CHLORIDE, PRESERVATIVE FREE 10 ML: 5 INJECTION INTRAVENOUS at 17:27

## 2022-12-14 RX ADMIN — ALBUTEROL SULFATE 2.5 MG: 2.5 SOLUTION RESPIRATORY (INHALATION) at 17:53

## 2022-12-14 RX ADMIN — BENZONATATE 100 MG: 100 CAPSULE ORAL at 08:25

## 2022-12-14 RX ADMIN — Medication 10 ML: at 08:26

## 2022-12-14 ASSESSMENT — PAIN SCALES - GENERAL: PAINLEVEL_OUTOF10: 0

## 2022-12-14 NOTE — PROGRESS NOTES
[facility] Progress Note    Admitting Date and Time: 12/13/2022 12:56 PM  Admit Dx: Influenza A [J10.1]  Mycobacterium avium complex ([facility] [address] [A31.0]  Acute on chronic respiratory failure with hypoxia (HCC) [J96.21]    Subjective:  Patient is being followed for Influenza A [J10.1]  Mycobacterium avium complex ([facility] [address] [A31.0]  Acute on chronic respiratory failure with hypoxia ([facility] [address] [J96.21]     Seen at bedside. Awake and alert. Afebrile. Tolerating medications, no side effects. Complaining of dry, nonproductive cough, and dyspnea with exertion. No wheezing.  On 2 L via NC.     ROS: denies fever, chills, cp, sob, n/v, HA unless stated above.      enoxaparin  30 mg SubCUTAneous Daily    cefTRIAXone (ROCEPHIN) IV  1,000 mg IntraVENous Q24H    doxycycline (VIBRAMYCIN) IV  100 mg IntraVENous Q12H    guaiFENesin  400 mg Oral 4x Daily    vilazodone HCl  40 mg Oral Daily    sodium chloride flush  5-40 mL IntraVENous 2 times per day    multivitamin  1 tablet Oral Daily    predniSONE  40 mg Oral Daily     sodium chloride flush, 5-40 mL, PRN  sodium chloride, , PRN  ondansetron, 4 mg, Q8H PRN   Or  ondansetron, 4 mg, Q6H PRN  polyethylene glycol, 17 g, Daily PRN  acetaminophen, 650 mg, Q6H PRN   Or  acetaminophen, 650 mg, Q6H PRN  benzonatate, 100 mg, TID PRN  HYDROcodone homatropine, 5 mL, Q4H PRN    Objective:    BP (!) 96/55   Pulse 66   Temp 97.9 °F (36.6 °C) (Oral)   Resp 18   Ht 5' 2\" (1.575 m)   Wt 100 lb 8 oz (45.6 kg)   LMP  (LMP Unknown)   SpO2 97%   BMI 18.38 kg/m²     General Appearance: alert and oriented to person, place and time and in no acute distress  Skin: warm and dry  Head: normocephalic and atraumatic  Eyes: pupils equal, round, and reactive to light, extraocular eye movements intact, conjunctivae normal  Neck: neck supple and non tender without mass   Pulmonary/Chest: diminished to auscultation bilaterally- no wheezes, rales or rhonchi, normal air movement, no respiratory distress +2 L via NC  + crackles upper lobes  Cardiovascular: normal rate, normal S1 and S2 and no carotid bruits  Abdomen: soft, non-tender, non-distended, normal bowel sounds, no masses or organomegaly  Extremities: no cyanosis, no clubbing and no edema  Neurologic: no cranial nerve deficit and speech normal    Recent Labs     12/13/22  1317 12/14/22  0359    135   K 3.9 4.6   CL 97* 97*   CO2 29 29   BUN 14 17   CREATININE 0.9 1.0   GLUCOSE 104* 122*   CALCIUM 9.1 9.4       Recent Labs     12/13/22  1317 12/14/22  0359   WBC 4.7 4.0*   RBC 4.14 4.24   HGB 12.4 12.2   HCT 39.3 39.9   MCV 94.9 94.1   MCH 30.0 28.8   MCHC 31.6* 30.6*   RDW 13.2 13.4    204   MPV 8.9 9.2       Radiology: reviewed    Assessment:  Active Problems:    * No active hospital problems. *  Resolved Problems:    * No resolved hospital problems. *    Plan:  Acute on chronic respiratory failure with hypoxia-symptom onset approximately 5 days ago. Presented to PCP found hypoxic 80% on room air although she chronically wears 2L NC at at bedtime, however has been wearing throughout the day lately. History of noncompliance with pulmonary treatments. CXR stable right lung opacities unchanged compared to 8/24/2022. Concerning for pneumonia. Ceftriaxone/doxycycline initiated. Sputum culture pending. Urine antigens NEGATIVE/procalcitonin NEGATIVE. Tessalon dose increased, hydrocodan PRN. On 2 L via NC - back to baseline. Maintain SpO2 > 90%. Pulmonology input appreciated. Pneumonia/recurrence vs worsening MAC infection?- CXR concerning for pneumonia (increasing right lung infiltrates). Had [employer] treatment 2016 and 2020. Ceftriaxone/doxycycline initiated. Sputum culture pending. Procalcitonin/urine antigens pending. Continue Mucinex, Tessalon PRN, Hycodan PRN. Flutter valve. Pulmonology input appreciated. Influenza A/Rhinovirus -symptom onset approximately 5 days ago. SOB/cough/chills.  Influenza A+/ Rhinovirus +.  continue Tamiflu x5 days - day 2.  Steroids. Isolation as per protocol. Supportive care. Hx Mycobacterium AVM complex- Diagnosed with MAC in 2016 and was treated with ethambutol, Rifampin, and clarithromycin. States bronch cultures were negative and treatment was stopped. Seen by ID in 2019 and was started on Ethambutol, Rifampin, and inhaled amikacin May 14, 2020. Was sent to hospital for weakness and ARF  for which patient blames medications although ID had extensive conversation with her. 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 Following off ATB per ID note August 2022. [facility] Both have had extensive discussions with her regarding pulmonary history and infections. Depression-continue Viibryd    CODE: full  DVT prophylaxis: Lovenox   Discharge dispo: home when medically stable. Likely in next 24 hours     26 minutes time spent reviewing patient chart, assessing patient, discussing plan of care with patient and family, discussing plan of care with collaborating physician, and charting.      Electronically signed by ALTHEA Loving NP on 12/14/2022 at 8:07 AM

## 2022-12-14 NOTE — PROGRESS NOTES
Pulmonary Progress Note    Admit Date: 2022                            PCP: Bob Huddleston MD  Active Problems:    * No active hospital problems. *  Resolved Problems:    * No resolved hospital problems. *      Subjective:  Patient seen on 2L NC with c/o persistent dry, non-productive cough and dyspnea with exertion. Denies wheezing     Medications:   sodium chloride          enoxaparin  30 mg SubCUTAneous Daily    cefTRIAXone (ROCEPHIN) IV  1,000 mg IntraVENous Q24H    doxycycline (VIBRAMYCIN) IV  100 mg IntraVENous Q12H    guaiFENesin  400 mg Oral 4x Daily    vilazodone HCl  40 mg Oral Daily    sodium chloride flush  5-40 mL IntraVENous 2 times per day    multivitamin  1 tablet Oral Daily    predniSONE  40 mg Oral Daily       Vitals:  VITALS:  BP (!) 96/55   Pulse 66   Temp 97.9 °F (36.6 °C) (Oral)   Resp 18   Ht 5' 2\" (1.575 m)   Wt 100 lb 8 oz (45.6 kg)   LMP  (LMP Unknown)   SpO2 97%   BMI 18.38 kg/m²   24HR INTAKE/OUTPUT:  No intake or output data in the 24 hours ending 22 1038  CURRENT PULSE OXIMETRY:  SpO2: 97 %  24HR PULSE OXIMETRY RANGE:  SpO2  Av.3 %  Min: 86 %  Max: 98 %  CVP:    VENT SETTINGS:      Additional Respiratory Assessments  Heart Rate: 66  Resp: 18  SpO2: 97 %      EXAM:  General: No distress. Alert. 2L  Eyes: No sclera icterus. No conjunctival injection. ENT: No discharge. Pharynx clear. Neck: Trachea midline. Normal thyroid. Resp: No accessory muscle use. No wheezing. No rhonchi. Rales RML, RLL  CV: Regular rate. Regular rhythm. No mumur or rub. No edema. ABD: Non-tender. Non-distended. Normal bowel sounds. Skin: Warm and dry. No nodule on exposed extremities. No rash on exposed extremities. M/S: No cyanosis. No joint deformity. No clubbing. Neuro: Awake. Follows commands. A&Ox3    I/O: No intake/output data recorded. No intake/output data recorded.      Results:  CBC:   Recent Labs     22  1317 22  0359   WBC 4.7 4.0*   HGB 12.4 12.2 HCT 39.3 39.9   MCV 94.9 94.1    204     BMP:   Recent Labs     12/13/22  1317 12/14/22  0359    135   K 3.9 4.6   CL 97* 97*   CO2 29 29   BUN 14 17   CREATININE 0.9 1.0     LFT:   Recent Labs     12/13/22  1317 12/14/22  0359   ALKPHOS 64 62   ALT 12 13   AST 22 23   PROT 7.3 7.1   BILITOT <0.2 <0.2   LABALBU 3.9 3.7     PT/INR: No results for input(s): PROTIME, INR in the last 72 hours. Cultures:  No results for input(s): CULTRESP in the last 72 hours. ABG:   No results for input(s): PH, PO2, PCO2, HCO3, BE, O2SAT in the last 72 hours. Films:  XR CHEST PORTABLE    Result Date: 12/13/2022  EXAMINATION: ONE XRAY VIEW OF THE CHEST 12/13/2022 2:06 pm COMPARISON: None. 08/24/2022 HISTORY: ORDERING SYSTEM PROVIDED HISTORY: Hypoxia, Hx of MAC TECHNOLOGIST PROVIDED HISTORY: Reason for exam:->Hypoxia, Hx of MAC FINDINGS: Stable right lung opacities. There is no effusion or pneumothorax. The cardiomediastinal silhouette is without acute process. The osseous structures are without acute process. Stable right lung opacities unchanged compared to 08/24/2022. Assessment:  Influenza A infection   + human meta pneumovirus   Concern with pneumonia vs recurrent MAC infection  History of bronchiectasis, non-compliant with current treatment regimen   History of MAC infection - off antibiotics       Plan:  Continue Tamiflu x 5 days total   Continue albuterol, CPT and flutter valve to aid in sputum expectoration   Continue Rocephin and Doxycycline for pneumonia. PCT 0.05. No leukocytosis. Sputum culture ordered awaiting specimen   Will add Tessalon to help with cough   Prednisone 40 mg daily x 5 days, day 2/5        Electronically signed by ALTHEA Camarena CNP on 12/14/2022 at 10:38 AM    Seen and examined, agree with above. Acute influenza A infection with acute exac of bronchiectasis. No sputum production currently and not convinced she has pneumonia.  Continue prednisone and will add albuterol which she says causes her to cough.

## 2022-12-14 NOTE — CARE COORDINATION
Social Work discharge planning    Sw spoke to pt who is in isolation for Influenza and Rhinovirus. Pt said she lives alone and ambulates independently. She has home 02 concentrator (no tanks) and nebulizer from [address]. SW spoke to Lincoln at [address] to confirm. IF pt needs continuous home 02, pt will need pulse ox testing. If 88% or lower, will need CONTINUOUS hhc dme order for [place]anny please. Pt said her sister will drive her home, but family not likely to stay with her \"in fear of catching something\". Pt said she does not want hhc at this time. PCP is Dr Bryan Xiao and pt prefers [facility] [address].    Electronically signed by Waqar Marinelli on 12/14/2022 at 10:35 AM

## 2022-12-14 NOTE — PROGRESS NOTES
This patient is on medication that requires renal, weight, and/or indication dose adjustment. Date Body Weight IBW  Adjusted BW SCr  CrCl Dialysis status   12/14/2022 100 lb 8 oz (45.6 kg) Ideal body weight: 50.1 kg (110 lb 7.2 oz) Serum creatinine: 1 mg/dL 12/14/22 0359  Estimated creatinine clearance: 40 mL/min N/a       Pharmacy has dose-adjusted the following medication(s):    Date Previous Order Adjusted Order   12/14/2022 Lovenox 40 mg daily Lovenox 30 mg daily       These changes were made per protocol according to the [address] for Pharmac[facility]. *Please note this dose may need readjusted if patient's condition changes. Please contact pharmacy with any questions regarding these changes.     Melvenia Hatchet, [facility]  12/14/2022  7:15 AM

## 2022-12-15 LAB
ALBUMIN SERPL-MCNC: 3.2 G/DL (ref 3.5–5.2)
ALP BLD-CCNC: 56 U/L (ref 35–104)
ALT SERPL-CCNC: 14 U/L (ref 0–32)
ANION GAP SERPL CALCULATED.3IONS-SCNC: 8 MMOL/L (ref 7–16)
AST SERPL-CCNC: 21 U/L (ref 0–31)
BASOPHILS ABSOLUTE: 0.01 E9/L (ref 0–0.2)
BASOPHILS RELATIVE PERCENT: 0.2 % (ref 0–2)
BILIRUB SERPL-MCNC: <0.2 MG/DL (ref 0–1.2)
BUN BLDV-MCNC: 17 MG/DL (ref 6–23)
CALCIUM SERPL-MCNC: 8.7 MG/DL (ref 8.6–10.2)
CHLORIDE BLD-SCNC: 101 MMOL/L (ref 98–107)
CO2: 29 MMOL/L (ref 22–29)
CREAT SERPL-MCNC: 0.9 MG/DL (ref 0.5–1)
EOSINOPHILS ABSOLUTE: 0 E9/L (ref 0.05–0.5)
EOSINOPHILS RELATIVE PERCENT: 0 % (ref 0–6)
GFR SERPL CREATININE-BSD FRML MDRD: >60 ML/MIN/1.73
GLUCOSE BLD-MCNC: 97 MG/DL (ref 74–99)
HCT VFR BLD CALC: 37.4 % (ref 34–48)
HEMOGLOBIN: 11.6 G/DL (ref 11.5–15.5)
IMMATURE GRANULOCYTES #: 0.01 E9/L
IMMATURE GRANULOCYTES %: 0.2 % (ref 0–5)
LYMPHOCYTES ABSOLUTE: 1.27 E9/L (ref 1.5–4)
LYMPHOCYTES RELATIVE PERCENT: 29.6 % (ref 20–42)
MCH RBC QN AUTO: 29.4 PG (ref 26–35)
MCHC RBC AUTO-ENTMCNC: 31 % (ref 32–34.5)
MCV RBC AUTO: 94.9 FL (ref 80–99.9)
MONOCYTES ABSOLUTE: 0.46 E9/L (ref 0.1–0.95)
MONOCYTES RELATIVE PERCENT: 10.7 % (ref 2–12)
NEUTROPHILS ABSOLUTE: 2.54 E9/L (ref 1.8–7.3)
NEUTROPHILS RELATIVE PERCENT: 59.3 % (ref 43–80)
PDW BLD-RTO: 13.3 FL (ref 11.5–15)
PLATELET # BLD: 188 E9/L (ref 130–450)
PMV BLD AUTO: 9.2 FL (ref 7–12)
POTASSIUM REFLEX MAGNESIUM: 4.1 MMOL/L (ref 3.5–5)
PROCALCITONIN: 0.03 NG/ML (ref 0–0.08)
RBC # BLD: 3.94 E12/L (ref 3.5–5.5)
SODIUM BLD-SCNC: 138 MMOL/L (ref 132–146)
TOTAL PROTEIN: 6.6 G/DL (ref 6.4–8.3)
WBC # BLD: 4.3 E9/L (ref 4.5–11.5)

## 2022-12-15 PROCEDURE — 6370000000 HC RX 637 (ALT 250 FOR IP)

## 2022-12-15 PROCEDURE — 6360000002 HC RX W HCPCS: Performed by: FAMILY MEDICINE

## 2022-12-15 PROCEDURE — 2500000003 HC RX 250 WO HCPCS: Performed by: FAMILY MEDICINE

## 2022-12-15 PROCEDURE — 80053 COMPREHEN METABOLIC PANEL: CPT

## 2022-12-15 PROCEDURE — G0378 HOSPITAL OBSERVATION PER HR: HCPCS

## 2022-12-15 PROCEDURE — 6370000000 HC RX 637 (ALT 250 FOR IP): Performed by: FAMILY MEDICINE

## 2022-12-15 PROCEDURE — 2580000003 HC RX 258: Performed by: FAMILY MEDICINE

## 2022-12-15 PROCEDURE — 84145 PROCALCITONIN (PCT): CPT

## 2022-12-15 PROCEDURE — 85025 COMPLETE CBC W/AUTO DIFF WBC: CPT

## 2022-12-15 PROCEDURE — 1200000000 HC SEMI PRIVATE

## 2022-12-15 PROCEDURE — 96366 THER/PROPH/DIAG IV INF ADDON: CPT

## 2022-12-15 PROCEDURE — 96372 THER/PROPH/DIAG INJ SC/IM: CPT

## 2022-12-15 PROCEDURE — 99232 SBSQ HOSP IP/OBS MODERATE 35: CPT | Performed by: INTERNAL MEDICINE

## 2022-12-15 PROCEDURE — 94640 AIRWAY INHALATION TREATMENT: CPT

## 2022-12-15 PROCEDURE — 36415 COLL VENOUS BLD VENIPUNCTURE: CPT

## 2022-12-15 PROCEDURE — 2700000000 HC OXYGEN THERAPY PER DAY

## 2022-12-15 PROCEDURE — 6360000002 HC RX W HCPCS: Performed by: INTERNAL MEDICINE

## 2022-12-15 RX ADMIN — GUAIFENESIN 400 MG: 400 TABLET ORAL at 13:29

## 2022-12-15 RX ADMIN — DOXYCYCLINE 100 MG: 100 INJECTION, POWDER, LYOPHILIZED, FOR SOLUTION INTRAVENOUS at 05:04

## 2022-12-15 RX ADMIN — ENOXAPARIN SODIUM 30 MG: 100 INJECTION SUBCUTANEOUS at 08:41

## 2022-12-15 RX ADMIN — Medication 5 ML: at 08:42

## 2022-12-15 RX ADMIN — GUAIFENESIN 400 MG: 400 TABLET ORAL at 08:41

## 2022-12-15 RX ADMIN — VILAZODONE HYDROCHLORIDE 40 MG: 40 TABLET ORAL at 08:41

## 2022-12-15 RX ADMIN — GUAIFENESIN 400 MG: 400 TABLET ORAL at 18:02

## 2022-12-15 RX ADMIN — ALBUTEROL SULFATE 2.5 MG: 2.5 SOLUTION RESPIRATORY (INHALATION) at 09:16

## 2022-12-15 RX ADMIN — GUAIFENESIN 400 MG: 400 TABLET ORAL at 21:48

## 2022-12-15 RX ADMIN — BENZONATATE 100 MG: 100 CAPSULE ORAL at 08:41

## 2022-12-15 RX ADMIN — HYDROCODONE BITARTRATE AND HOMATROPINE METHYLBROMIDE 10 ML: 5; 1.5 SOLUTION ORAL at 08:41

## 2022-12-15 RX ADMIN — MULTIVITAMIN TABLET 1 TABLET: TABLET at 08:41

## 2022-12-15 RX ADMIN — HYDROCODONE BITARTRATE AND HOMATROPINE METHYLBROMIDE 10 ML: 5; 1.5 SOLUTION ORAL at 18:02

## 2022-12-15 RX ADMIN — PREDNISONE 40 MG: 20 TABLET ORAL at 08:41

## 2022-12-15 RX ADMIN — Medication 10 ML: at 21:48

## 2022-12-15 NOTE — PROGRESS NOTES
O2 on RA: 92  O2 while ambulating on RA: 86%  Ambulating on 4L O2: 95%, Pt stayed in the 80's on 2-3L  O2 post ambulating at rest: able to decrease to 2L, pt was 95%

## 2022-12-15 NOTE — CARE COORDINATION
Social work / Discharge Planning:                  Discharge plan is home. Patient does not have continuous oxygen at home. Only a concentrator from [facility]. If continuous oxygen is needed for discharge, patient will need a new DME order. Awaiting pulse ox testing.     Electronically signed by CHRIS Harrison on 12/15/2022 at 10:31 AM

## 2022-12-15 NOTE — PATIENT CARE CONFERENCE
P Quality Flow/Interdisciplinary Rounds Progress Note        Quality Flow Rounds held on December 15, 2022    Disciplines Attending:  Bedside Nurse, , , and Nursing Unit Leadership    Isatu Newman was admitted on 12/13/2022 12:56 PM    Anticipated Discharge Date:       Disposition:    Trevor Score:  Trevor Scale Score: 21    Readmission Risk              Risk of Unplanned Readmission:  0           Discussed patient goal for the day, patient clinical progression, and barriers to discharge.   The following Goal(s) of the Day/Commitment(s) have been identified:  Discharge - Obtain Order check ambulatory pulse ox      Maura Zimmerman RN  December 15, 2022

## 2022-12-15 NOTE — PROGRESS NOTES
Pulmonary Progress Note    Admit Date: 2022                            PCP: Chuyita Abbott MD  Active Problems:    * No active hospital problems. *  Resolved Problems:    * No resolved hospital problems. *      Subjective:  Patient seen on 2L NC still with c/o persistent dry, non-productive cough and dyspnea with exertion. Denies wheezing     Medications:   sodium chloride          enoxaparin  30 mg SubCUTAneous Daily    cefTRIAXone (ROCEPHIN) IV  1,000 mg IntraVENous Q24H    doxycycline (VIBRAMYCIN) IV  100 mg IntraVENous Q12H    guaiFENesin  400 mg Oral 4x Daily    vilazodone HCl  40 mg Oral Daily    sodium chloride flush  5-40 mL IntraVENous 2 times per day    multivitamin  1 tablet Oral Daily    predniSONE  40 mg Oral Daily       Vitals:  VITALS:  BP (!) 110/56   Pulse 68   Temp 98 °F (36.7 °C) (Oral)   Resp 14   Ht 5' 2\" (1.575 m)   Wt 100 lb (45.4 kg)   LMP  (LMP Unknown)   SpO2 95%   BMI 18.29 kg/m²   24HR INTAKE/OUTPUT:  No intake or output data in the 24 hours ending 12/15/22 1006  CURRENT PULSE OXIMETRY:  SpO2: 95 %  24HR PULSE OXIMETRY RANGE:  SpO2  Av.5 %  Min: 94 %  Max: 98 %  CVP:    VENT SETTINGS:      Additional Respiratory Assessments  Heart Rate: 68  Resp: 14  SpO2: 95 %      EXAM:  General: No distress. Alert. 2L  Eyes: No sclera icterus. No conjunctival injection. ENT: No discharge. Pharynx clear. Neck: Trachea midline. Normal thyroid. Resp: No accessory muscle use. No wheezing. No rhonchi. Rales RML, RLL  CV: Regular rate. Regular rhythm. No mumur or rub. No edema. ABD: Non-tender. Non-distended. Normal bowel sounds. Skin: Warm and dry. No nodule on exposed extremities. No rash on exposed extremities. M/S: No cyanosis. No joint deformity. No clubbing. Neuro: Awake. Follows commands. A&Ox3    I/O: No intake/output data recorded. No intake/output data recorded.      Results:  CBC:   Recent Labs     22  1317 22  0359 12/15/22  0209   WBC 4.7 4.0* 4.3*   HGB 12.4 12.2 11.6   HCT 39.3 39.9 37.4   MCV 94.9 94.1 94.9    204 188     BMP:   Recent Labs     12/13/22  1317 12/14/22  0359 12/15/22  0209    135 138   K 3.9 4.6 4.1   CL 97* 97* 101   CO2 29 29 29   BUN 14 17 17   CREATININE 0.9 1.0 0.9     LFT:   Recent Labs     12/13/22  1317 12/14/22  0359 12/15/22  0209   ALKPHOS 64 62 56   ALT 12 13 14   AST 22 23 21   PROT 7.3 7.1 6.6   BILITOT <0.2 <0.2 <0.2   LABALBU 3.9 3.7 3.2*     PT/INR: No results for input(s): PROTIME, INR in the last 72 hours. Cultures:  No results for input(s): CULTRESP in the last 72 hours. ABG:   No results for input(s): PH, PO2, PCO2, HCO3, BE, O2SAT in the last 72 hours. Films:  XR CHEST PORTABLE 2/13/2022  EXAMINATION: ONE XRAY VIEW OF THE CHEST 12/13/2022 2:06 pm COMPARISON: None. 08/24/2022 HISTORY: ORDERING SYSTEM PROVIDED HISTORY: Hypoxia, Hx of MAC TECHNOLOGIST PROVIDED HISTORY: Reason for exam:->Hypoxia, Hx of MAC FINDINGS: Stable right lung opacities. There is no effusion or pneumothorax. The cardiomediastinal silhouette is without acute process. The osseous structures are without acute process. Stable right lung opacities unchanged compared to 08/24/2022. Assessment:  Influenza A infection with acute exacerbation of bronchiectasis  + human meta pneumovirus   Concern with pneumonia vs recurrent MAC infection  Acute exacerbation of bronchiectasis secondary to flu a -  non-compliant with current treatment regimen   History of MAC infection - off antibiotics       Plan:  Tamiflu not initiated, out of 48-hour window. Patient seen on 2 L nasal cannula, pulse ox 95%, baseline is room air. Ambulatory pulse ox to be completed on room air   Continue albuterol, CPT and flutter valve to aid in sputum expectoration   Will stop Rocephin and Doxycycline with negative PCT 0.05>0.03, no leukocytosis and afebrile. Sputum culture ordered awaiting specimen   Continue Tessalon to help with cough.   On Hycodan per primary  Prednisone 40 mg daily x 5 days, day 3/5.   Will complete 12/17        Electronically signed by ALTHEA Booker CNP on 12/15/2022 at 10:06 AM

## 2022-12-15 NOTE — PROGRESS NOTES
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 States cough is worse after breathing treatments. Harsh, dry cough remains. On 2L via N[place]. Fair appetite. Tolerating medications, no side effects.      ROS: denies fever, chills, cp, sob, n/v, HA unless stated above.      enoxaparin  30 mg SubCUTAneous Daily    benzonatate  100 mg Oral TID    albuterol  2.5 mg Nebulization 4x daily    cefTRIAXone (ROCEPHIN) IV  1,000 mg IntraVENous Q24H    doxycycline (VIBRAMYCIN) IV  100 mg IntraVENous Q12H    guaiFENesin  400 mg Oral 4x Daily    vilazodone HCl  40 mg Oral Daily    sodium chloride flush  5-40 mL IntraVENous 2 times per day    multivitamin  1 tablet Oral Daily    predniSONE  40 mg Oral Daily     benzonatate, 200 mg, TID PRN  HYDROcodone homatropine, 10 mL, Q4H PRN  sodium chloride flush, 5-40 mL, PRN  sodium chloride, , PRN  ondansetron, 4 mg, Q8H PRN   Or  ondansetron, 4 mg, Q6H PRN  polyethylene glycol, 17 g, Daily PRN  acetaminophen, 650 mg, Q6H PRN   Or  acetaminophen, 650 mg, Q6H PRN  Objective:    BP (!) 110/56   Pulse 72   Temp 98 °F (36.7 °C) (Oral)   Resp 16   Ht 5' 2\" (1.575 m)   Wt 100 lb (45.4 kg)   LMP  (LMP Unknown)   SpO2 98%   BMI 18.29 kg/m²     General Appearance: alert and oriented to person, place and time and in no acute distress - thin   Skin: warm and dry  Head: normocephalic and atraumatic  Eyes: pupils equal, round, and reactive to light, extraocular eye movements intact, conjunctivae normal  Neck: neck supple and non tender without mass   Pulmonary/Chest: diminished to auscultation bilaterally- no wheezes, rales or rhonchi, normal air movement, no respiratory distress +2 L via NC + cough   Cardiovascular: normal rate, normal S1 and S2 and no carotid bruits  Abdomen: soft, non-tender, non-distended, normal bowel sounds, no masses or organomegaly  Extremities: no cyanosis, no clubbing and no edema  Neurologic: no cranial nerve deficit and speech normal    Recent Labs     12/13/22  1317 12/14/22  0359 12/15/22  0209    135 138   K 3.9 4.6 4.1   CL 97* 97* 101   CO2 29 29 29   BUN 14 17 17   CREATININE 0.9 1.0 0.9   GLUCOSE 104* 122* 97   CALCIUM 9.1 9.4 8.7       Recent Labs     12/13/22  1317 12/14/22  0359 12/15/22  0209   WBC 4.7 4.0* 4.3*   RBC 4.14 4.24 3.94   HGB 12.4 12.2 11.6   HCT 39.3 39.9 37.4   MCV 94.9 94.1 94.9   MCH 30.0 28.8 29.4   MCHC 31.6* 30.6* 31.0*   RDW 13.2 13.4 13.3    204 188   MPV 8.9 9.2 9.2       Radiology: reviewed    Assessment:  Active Problems:    * No active hospital problems. *  Resolved Problems:    * No resolved hospital problems. *    Plan:  Acute on chronic respiratory failure with hypoxia-symptom onset approximately 5 days ago. Presented to PCP found hypoxic 80% on room air although she chronically wears 2L NC at at bedtime, however has been wearing throughout the day lately. History of noncompliance with pulmonary treatments. CXR stable right lung opacities unchanged compared to 8/24/2022. Concerning for pneumonia. Ceftriaxone/doxycycline day 3. Sputum culture pending. Urine antigens NEGATIVE/procalcitonin NEGATIVE. Tessalon dose increased, hydrocodan PRN. On 2 L via NC - back to baseline. Maintain SpO2 > 90%. Pulmonology input appreciated. Pneumonia/recurrence vs worsening MAC infection?- CXR concerning for pneumonia (increasing right lung infiltrates). Had Strykeryamilex 27 treatment 2016 and 2020. Continue Ceftriaxone/doxycycline. Sputum culture pending. Procalcitonin 0.03, urine antigens negative. Continue Mucinex, Tessalon PRN, Hycodan PRN. Flutter valve.   Pulmonology input appreciated - not convinced she has pneumonia. DC Albuterol - states it makes cough worse. Influenza A/Rhinovirus -symptom onset approximately 5 days ago. SOB/cough/chills. Influenza A+/ Rhinovirus +.  continue Tamiflu x5 days - day 3. Steroids day 3/5. Isolation as per protocol. Supportive care. Hx Mycobacterium AVM complex- Diagnosed with MAC in 2016 and was treated with ethambutol, Rifampin, and clarithromycin. States bronch cultures were negative and treatment was stopped. Seen by ID in 2019 and was started on Ethambutol, Rifampin, and inhaled amikacin May 14, 2020. Was sent to hospital for weakness and ARF  for which patient blames medications although ID had extensive conversation with her. 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 Following off ATB per ID note August 2022. Follows with Dr Maycol Molina, pulmonary and Lexi Casanovamokirsten. Both have had extensive discussions with her regarding pulmonary history and infections. Depression-continue Viibryd    CODE: full  DVT prophylaxis: Lovenox   Discharge dispo: home likely tomorrow    27 minutes time spent reviewing patient chart, assessing patient, discussing plan of care with patient and family, discussing plan of care with collaborating physician, and charting.      Electronically signed by ALTHEA Santamaria NP on 12/15/2022 at 9:17 AM

## 2022-12-16 VITALS
HEART RATE: 68 BPM | HEIGHT: 62 IN | OXYGEN SATURATION: 96 % | BODY MASS INDEX: 18.58 KG/M2 | DIASTOLIC BLOOD PRESSURE: 55 MMHG | TEMPERATURE: 98.5 F | SYSTOLIC BLOOD PRESSURE: 111 MMHG | RESPIRATION RATE: 16 BRPM | WEIGHT: 101 LBS

## 2022-12-16 LAB
ALBUMIN SERPL-MCNC: 3.4 G/DL (ref 3.5–5.2)
ALP BLD-CCNC: 57 U/L (ref 35–104)
ALT SERPL-CCNC: 15 U/L (ref 0–32)
ANION GAP SERPL CALCULATED.3IONS-SCNC: 7 MMOL/L (ref 7–16)
AST SERPL-CCNC: 21 U/L (ref 0–31)
ATYPICAL LYMPHOCYTE RELATIVE PERCENT: 6.2 % (ref 0–4)
BASOPHILS ABSOLUTE: 0 E9/L (ref 0–0.2)
BASOPHILS RELATIVE PERCENT: 0 % (ref 0–2)
BILIRUB SERPL-MCNC: <0.2 MG/DL (ref 0–1.2)
BUN BLDV-MCNC: 19 MG/DL (ref 6–23)
CALCIUM SERPL-MCNC: 9 MG/DL (ref 8.6–10.2)
CHLORIDE BLD-SCNC: 102 MMOL/L (ref 98–107)
CO2: 29 MMOL/L (ref 22–29)
CREAT SERPL-MCNC: 0.8 MG/DL (ref 0.5–1)
EOSINOPHILS ABSOLUTE: 0 E9/L (ref 0.05–0.5)
EOSINOPHILS RELATIVE PERCENT: 0 % (ref 0–6)
GFR SERPL CREATININE-BSD FRML MDRD: >60 ML/MIN/1.73
GLUCOSE BLD-MCNC: 90 MG/DL (ref 74–99)
HCT VFR BLD CALC: 39 % (ref 34–48)
HEMOGLOBIN: 11.9 G/DL (ref 11.5–15.5)
LYMPHOCYTES ABSOLUTE: 0.74 E9/L (ref 1.5–4)
LYMPHOCYTES RELATIVE PERCENT: 14 % (ref 20–42)
MCH RBC QN AUTO: 28.8 PG (ref 26–35)
MCHC RBC AUTO-ENTMCNC: 30.5 % (ref 32–34.5)
MCV RBC AUTO: 94.4 FL (ref 80–99.9)
MONOCYTES ABSOLUTE: 0.41 E9/L (ref 0.1–0.95)
MONOCYTES RELATIVE PERCENT: 10.5 % (ref 2–12)
NEUTROPHILS ABSOLUTE: 2.55 E9/L (ref 1.8–7.3)
NEUTROPHILS RELATIVE PERCENT: 69.3 % (ref 43–80)
NUCLEATED RED BLOOD CELLS: 0 /100 WBC
PDW BLD-RTO: 13.2 FL (ref 11.5–15)
PLATELET # BLD: 202 E9/L (ref 130–450)
PMV BLD AUTO: 9.3 FL (ref 7–12)
POTASSIUM REFLEX MAGNESIUM: 4.2 MMOL/L (ref 3.5–5)
RBC # BLD: 4.13 E12/L (ref 3.5–5.5)
RBC # BLD: NORMAL 10*6/UL
SMUDGE CELLS: ABNORMAL
SODIUM BLD-SCNC: 138 MMOL/L (ref 132–146)
TOTAL PROTEIN: 6.5 G/DL (ref 6.4–8.3)
WBC # BLD: 3.7 E9/L (ref 4.5–11.5)

## 2022-12-16 PROCEDURE — 36415 COLL VENOUS BLD VENIPUNCTURE: CPT

## 2022-12-16 PROCEDURE — 6360000002 HC RX W HCPCS

## 2022-12-16 PROCEDURE — 6370000000 HC RX 637 (ALT 250 FOR IP): Performed by: FAMILY MEDICINE

## 2022-12-16 PROCEDURE — 6360000002 HC RX W HCPCS: Performed by: FAMILY MEDICINE

## 2022-12-16 PROCEDURE — 85025 COMPLETE CBC W/AUTO DIFF WBC: CPT

## 2022-12-16 PROCEDURE — 2580000003 HC RX 258: Performed by: FAMILY MEDICINE

## 2022-12-16 PROCEDURE — 94640 AIRWAY INHALATION TREATMENT: CPT

## 2022-12-16 PROCEDURE — 2700000000 HC OXYGEN THERAPY PER DAY

## 2022-12-16 PROCEDURE — 80053 COMPREHEN METABOLIC PANEL: CPT

## 2022-12-16 PROCEDURE — 99239 HOSP IP/OBS DSCHRG MGMT >30: CPT | Performed by: INTERNAL MEDICINE

## 2022-12-16 RX ORDER — GUAIFENESIN 400 MG/1
400 TABLET ORAL 4 TIMES DAILY
Qty: 9 TABLET | Refills: 0 | Status: SHIPPED | OUTPATIENT
Start: 2022-12-16 | End: 2022-12-19

## 2022-12-16 RX ORDER — PREDNISONE 20 MG/1
40 TABLET ORAL DAILY
Qty: 2 TABLET | Refills: 0 | Status: SHIPPED | OUTPATIENT
Start: 2022-12-17 | End: 2022-12-18

## 2022-12-16 RX ORDER — ALBUTEROL SULFATE 2.5 MG/3ML
2.5 SOLUTION RESPIRATORY (INHALATION) EVERY 6 HOURS PRN
Qty: 120 EACH | Refills: 3 | Status: SHIPPED | OUTPATIENT
Start: 2022-12-16

## 2022-12-16 RX ORDER — ALBUTEROL SULFATE 2.5 MG/3ML
2.5 SOLUTION RESPIRATORY (INHALATION) EVERY 6 HOURS PRN
Qty: 120 EACH | Refills: 3 | Status: SHIPPED | OUTPATIENT
Start: 2022-12-16 | End: 2022-12-16 | Stop reason: SDUPTHER

## 2022-12-16 RX ORDER — ALBUTEROL SULFATE 2.5 MG/3ML
2.5 SOLUTION RESPIRATORY (INHALATION) 2 TIMES DAILY
Qty: 120 EACH | Refills: 3 | Status: SHIPPED | OUTPATIENT
Start: 2022-12-16

## 2022-12-16 RX ORDER — ALBUTEROL SULFATE 2.5 MG/3ML
2.5 SOLUTION RESPIRATORY (INHALATION) 2 TIMES DAILY
Status: DISCONTINUED | OUTPATIENT
Start: 2022-12-16 | End: 2022-12-16 | Stop reason: HOSPADM

## 2022-12-16 RX ORDER — BENZONATATE 200 MG/1
200 CAPSULE ORAL 3 TIMES DAILY PRN
Qty: 21 CAPSULE | Refills: 0 | Status: SHIPPED | OUTPATIENT
Start: 2022-12-16 | End: 2022-12-23

## 2022-12-16 RX ORDER — ALBUTEROL SULFATE 2.5 MG/3ML
2.5 SOLUTION RESPIRATORY (INHALATION) EVERY 6 HOURS PRN
Status: DISCONTINUED | OUTPATIENT
Start: 2022-12-16 | End: 2022-12-16 | Stop reason: HOSPADM

## 2022-12-16 RX ORDER — ALBUTEROL SULFATE 2.5 MG/3ML
2.5 SOLUTION RESPIRATORY (INHALATION) 2 TIMES DAILY
Qty: 120 EACH | Refills: 3 | Status: SHIPPED | OUTPATIENT
Start: 2022-12-16 | End: 2022-12-16 | Stop reason: SDUPTHER

## 2022-12-16 RX ADMIN — MULTIVITAMIN TABLET 1 TABLET: TABLET at 07:40

## 2022-12-16 RX ADMIN — ALBUTEROL SULFATE 2.5 MG: 2.5 SOLUTION RESPIRATORY (INHALATION) at 12:00

## 2022-12-16 RX ADMIN — GUAIFENESIN 400 MG: 400 TABLET ORAL at 12:39

## 2022-12-16 RX ADMIN — Medication 10 ML: at 07:42

## 2022-12-16 RX ADMIN — GUAIFENESIN 400 MG: 400 TABLET ORAL at 07:40

## 2022-12-16 RX ADMIN — PREDNISONE 40 MG: 20 TABLET ORAL at 07:40

## 2022-12-16 RX ADMIN — VILAZODONE HYDROCHLORIDE 40 MG: 40 TABLET ORAL at 07:40

## 2022-12-16 RX ADMIN — HYDROCODONE BITARTRATE AND HOMATROPINE METHYLBROMIDE 10 ML: 5; 1.5 SOLUTION ORAL at 07:40

## 2022-12-16 RX ADMIN — ENOXAPARIN SODIUM 30 MG: 100 INJECTION SUBCUTANEOUS at 07:40

## 2022-12-16 NOTE — PATIENT CARE CONFERENCE
[facility] Quality Flow/Interdisciplinary Rounds Progress Note        Quality Flow Rounds held on December 16, 2022    Disciplines Attending:  Bedside Nurse, , and Nursing Unit Leadership    Allan Garcia was admitted on 12/13/2022 12:56 PM    Anticipated Discharge Date:       Disposition:    Trevor Score:  Trevor Scale Score: 21    Readmission Risk              Risk of Unplanned Readmission:  11           Discussed patient goal for the day, patient clinical progression, and barriers to discharge.   The following Goal(s) of the Day/Commitment(s) have been identified:  Diagnostics - Report Results      Teresita Payne RN  December 16, 2022

## 2022-12-16 NOTE — DISCHARGE SUMMARY
[facility] Physician Discharge Summary       No follow-up provider specified. Activity level: As tolerated   Dispo: Home  Condition on discharge: Stable    Patient ID:  Paola Singh  [id number]  74 y.o.  1955    Admit date: 12/13/2022    Discharge date and time:  12/16/2022  1:06 PM    Admission Diagnoses: Principal Problem:    Acute on chronic respiratory failure with hypoxia ([facility] [address]  Resolved Problems:    * No resolved hospital problems. *      Discharge Diagnoses: Principal Problem:    Acute on chronic respiratory failure with hypoxia (HCC)  Resolved Problems:    * No resolved hospital problems. *      Consults:  IP CONSULT TO PULMONOLOGY  IP CONSULT TO CASE MANAGEMENT    Hospital Course:   Patient Paola Singh is a 74 y.o. presented with Influenza A [J10.1]  Mycobacterium avium complex ([facility] [address] [A31.0]  Acute on chronic respiratory failure with hypoxia ([facility] [address] [J96.21]    74 y.o. female with a history of bronchiectasis, COPD, depression, blood clots, Mycobacterium AVM complex presents with worsening shortness of breath, cough, chills x5 days. Patient complaint is moderate in severity, persistent, worsened with exertion. Previous MAC. Off antibiotic therapy for at least 2 years. Does chronically wear 2L NC at at bedtime. With increased shortness of breath has been using throughout the day. Presented to PCP. She was found to be hypoxic with SPO2 mid 80s on 2L NC. Has been off treatment for at least 2 years 2/2 complications with renal insufficiency. States she was placed on hemodialysis at one time. She was transferred to ED for further evaluation. Patient states she has poor appetite and significantly sore throat. Patient denies CP, N/V/D, fevers. States did have some productive cough prior to presentation, however has been having difficulty bringing anything up now. BUN/Crt 14/0.9. H/H 12.4/39.3. WBC 4.7. Blood cultures obtained and pending. Influenza A+.   CXR stable right lung opacities unchanged compared to 8/24/2022. Received albuterol 2.5 mg INH x1/ceftriaxone 1G IV x1/doxycycline 100 mg IV x1/DuoNeb x1/Solu-Medrol 125 mg IV x1 in ED course. Pulmonology has been consulted decision to admit patient for further evaluation and treatment. Acute on chronic respiratory failure with hypoxia-symptom onset approximately 5 days ago. Presented to PCP found hypoxic 80% on room air although she chronically wears 2L NC at at bedtime, however has been wearing throughout the day lately. History of noncompliance with pulmonary treatments. CXR stable right lung opacities unchanged compared to 8/24/2022. Concerning for pneumonia. Ceftriaxone/doxycycline day 3. Sputum culture pending. Urine antigens NEGATIVE/procalcitonin NEGATIVE. Tessalon dose increased, hydrocodan PRN. On 2 L via NC - back to baseline. Maintain SpO2 > 90%. Pulmonology input appreciated. Pneumonia/recurrence vs worsening MAC infection?- CXR concerning for pneumonia (increasing right lung infiltrates). [facility] 2016 and 2020. Sputum culture pending. Procalcitonin 0.03, urine antigens negative. Continue Mucinex, Tessalon PRN, Hycodan PRN. Flutter valve. Pulmonology input appreciated - not convinced she has pneumonia. DC Albuterol - states it makes cough worse. DC Ceftriaxone and Doxycycline. Influenza A/Rhinovirus -symptom onset approximately 5 days ago. SOB/cough/chills. Influenza A+/ Rhinovirus +. outside of window for tamiflu. Steroids day 4/5. Isolation as per protocol. Supportive care. Hx Mycobacterium AVM complex- Diagnosed with MAC in 2016 and was treated with ethambutol, Rifampin, and clarithromycin. States bronch cultures were negative and treatment was stopped. Seen by ID in 2019 and was started on Ethambutol, Rifampin, and inhaled amikacin May 14, 2020. Was sent to hospital for weakness and ARF  for which patient blames medications although ID had extensive conversation with her.  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 Following off ATB per ID note August 2022. Follows with Dr Jd Huddleston, pulmonary and Dr Lizbeth Ulloa [facility]. Both have had extensive discussions with her regarding pulmonary history and infections. Depression-continue Viibryd    Patient is hemodynamically and medically stable. Walking pulse ox did not reveal a need for oxygen. Follow up with PCP and pulmonary. Discharge Exam:    General Appearance: alert and oriented to person, place and time and in no acute distress  Skin: warm and dry  Head: normocephalic and atraumatic  Eyes: pupils equal, round, and reactive to light, extraocular eye movements intact, conjunctivae normal  Neck: neck supple and non tender without mass   Pulmonary/Chest: clear to auscultation bilaterally- no wheezes, rales or rhonchi, normal air movement, no respiratory distress  Cardiovascular: normal rate, normal S1 and S2 and no carotid bruits  Abdomen: soft, non-tender, non-distended, normal bowel sounds, no masses or organomegaly  Extremities: no cyanosis, no clubbing and no edema  Neurologic: no cranial nerve deficit and speech normal    I/O last 3 completed shifts: In: 120 [P.O.:120]  Out: -   I/O this shift:  In: 240 [P.O.:240]  Out: -       LABS:  Recent Labs     12/14/22  0359 12/15/22  0209 12/16/22  0230    138 138   K 4.6 4.1 4.2   CL 97* 101 102   CO2 29 29 29   BUN 17 17 19   CREATININE 1.0 0.9 0.8   GLUCOSE 122* 97 90   CALCIUM 9.4 8.7 9.0       Recent Labs     12/14/22  0359 12/15/22  0209 12/16/22  0230   WBC 4.0* 4.3* 3.7*   RBC 4.24 3.94 4.13   HGB 12.2 11.6 11.9   HCT 39.9 37.4 39.0   MCV 94.1 94.9 94.4   MCH 28.8 29.4 28.8   MCHC 30.6* 31.0* 30.5*   RDW 13.4 13.3 13.2    188 202   MPV 9.2 9.2 9.3       No results for input(s): POCGLU in the last 72 hours.     Imaging:  XR CHEST PORTABLE    Result Date: 12/13/2022  EXAMINATION: ONE XRAY VIEW OF THE CHEST 12/13/2022 2:06 pm COMPARISON: None. 08/24/2022 HISTORY: ORDERING SYSTEM PROVIDED HISTORY: Hypoxia, Hx of MAC TECHNOLOGIST PROVIDED HISTORY: Reason for exam:->Hypoxia, Hx of MAC FINDINGS: Stable right lung opacities. There is no effusion or pneumothorax. The cardiomediastinal silhouette is without acute process. The osseous structures are without acute process. Stable right lung opacities unchanged compared to 08/24/2022. Patient Instructions:      Medication List        START taking these medications      benzonatate 200 MG capsule  Commonly known as: TESSALON  Take 1 capsule by mouth 3 times daily as needed for Cough     guaiFENesin 400 MG tablet  Take 1 tablet by mouth 4 times daily for 9 doses     predniSONE 20 MG tablet  Commonly known as: DELTASONE  Take 2 tablets by mouth daily for 1 dose  Start taking on: December 17, 2022            CHANGE how you take these medications      * albuterol sulfate  (90 Base) MCG/ACT inhaler  Commonly known as: PROVENTIL;VENTOLIN;PROAIR  INHALE 2 PUFFS INTO THE LUNGS EVERY 6-8 HOURS AS NEEDED FOR WHEEZING  What changed: Another medication with the same name was added. Make sure you understand how and when to take each. * albuterol (2.5 MG/3ML) 0.083% nebulizer solution  Commonly known as: PROVENTIL  Take 3 mLs by nebulization in the morning and at bedtime  What changed: You were already taking a medication with the same name, and this prescription was added. Make sure you understand how and when to take each. * albuterol (2.5 MG/3ML) 0.083% nebulizer solution  Commonly known as: PROVENTIL  Take 3 mLs by nebulization every 6 hours as needed for Wheezing  What changed: You were already taking a medication with the same name, and this prescription was added. Make sure you understand how and when to take each. * This list has 3 medication(s) that are the same as other medications prescribed for you.  Read the directions carefully, and ask your doctor or other care provider to review them with you. CONTINUE taking these medications      acetaminophen 500 MG tablet  Commonly known as: TYLENOL     * ALIVE MULTI-VITAMIN PO     * HAIR SKIN AND NAILS FORMULA PO     vilazodone HCl 40 MG Tabs  Commonly known as: VIIBRYD           * This list has 2 medication(s) that are the same as other medications prescribed for you. Read the directions carefully, and ask your doctor or other care provider to review them with you.                    Where to Get Your Medications        These medications were sent to [facility], [facility] [phone]  [facility] [address], [address] [address]      Phone: [phone]   benzonatate 200 MG capsule  guaiFENesin 400 MG tablet  predniSONE 20 MG tablet       You can get these medications from any pharmacy    Bring a paper prescription for each of these medications  albuterol (2.5 MG/3ML) 0.083% nebulizer solution  albuterol (2.5 MG/3ML) 0.083% nebulizer solution       Note that more than 30 minutes was spent in preparing discharge papers, discussing discharge with patient, medication review, etc.    Signed:  Electronically signed by ALTHEA Willoughby NP on 12/16/2022 at 1:06 PM

## 2022-12-16 NOTE — PROGRESS NOTES
Patient 100% on 2L via NC. Ambulation on room air oxygen saturation was 96%. Oxygen not reapplied at this time.

## 2022-12-16 NOTE — CARE COORDINATION
Social work /  Discharge Planning:          CM updated social work that patient does not qualify for continuous oxygen at home. She does have a concentrator which she uses at . Social work spoke to patient via phone and offered home care which patient declines. She denies the need for home care at this time.     Electronically signed by CHRIS Stanley on 12/16/2022 at 10:59 AM

## 2022-12-18 LAB
BLOOD CULTURE, ROUTINE: NORMAL
CULTURE, BLOOD 2: NORMAL

## 2022-12-19 ENCOUNTER — CARE COORDINATION (OUTPATIENT)
Dept: CARE COORDINATION | Age: 67
End: 2022-12-19

## 2022-12-19 NOTE — ADT AUTH CERT
Utilization Reviews       Viral Illness, Acute - Care Day 3 (12/15/2022) by Santa Gupta RN       Review Status Review Entered   Completed 12/16/2022 1206       Created By   Carter Chilel RN      Criteria Review      Care Day: 3 Care Date: 12/15/2022 Level of Care: Inpatient Floor    Guideline Day 2    Level Of Care    (X) Floor    12/16/2022 12:06 PM EST by Carter Rothman      MED-SURG    Clinical Status    (X) * Hypotension absent    12/16/2022 12:06 PM EST by Carisa Kulkarni      /58    (X) * No requirement for mechanical ventilation    ( ) * Oxygenation at baseline or improved    12/16/2022 12:06 PM EST by Carter Plascencia      SpO2 95% at 2L via nasal cannula    (X) * Mental status at baseline    12/16/2022 12:06 PM EST by Carisa Kulkarni      alert and oriented to person    Activity    ( ) Advance activity as tolerated    12/16/2022 12:06 PM EST by Carisa Kulkarni      minimal ambulation    Routes    (X) * Oral hydration    12/16/2022 12:06 PM EST by Carisa Kulkanri      minimal po intake    ( ) Usual diet    12/16/2022 12:06 PM EST by Carter Rothman      Regular    Interventions    (X) Possible Isolation    (X) Pulse oximetry    12/16/2022 12:06 PM EST by Carisa Kulkarni      check pulse oximetry while ambulating    (X) Possible oxygen    12/16/2022 12:06 PM EST by Carisa Kulkarni      at 2l via nasal cannula    Medications    (X) Possible antibiotic (eg, for bacterial coinfection or superinfection)    12/16/2022 12:06 PM EST by Carter Rothman      vibramycin 100 mg every 12 hours iv x1    (X) Possible corticosteroid    12/16/2022 12:06 PM EST by Carter Rothman      deltasone 40 mg daily po    (X) Possible DVT prophylaxis    12/16/2022 12:06 PM EST by Carter Plascencia      lovenox 30 mg daily sc       Definitions for Care Day 3    Hypotension absent    (X) Hypotension absent, as indicated by  1 or more  of the following  (1) (2) (3) (4): (X) SBP greater than or equal to 90 mm Hg and without recent decrease greater than 40 mm Hg from       baseline in adult or child 10 years or older       * Milestone   Additional Notes   DATE: 12/15/2022      PERTINENT UPDATES:   -patient is on droplet and contact isolation   -patient states cough is worse after breathing treatments   -patient has a non-productive cough and dyspnea with exertion   wbc 4.3       VITALS:   T 98.2(36.8)   RR 16   HR 72   /58   SpO2 95% at 2L via nasal cannula      ABNL/PERTINENT LABS/RADIOLOGY/DIAGNOSTIC STUDIES:      albumin 3.2 L      PHYSICAL EXAM:   Pulmonary/Chest: diminished to auscultation bilaterally- no wheezes, rales or rhonchi, normal air movement, no respiratory distress +2 L via NC + cough    Cardiovascular: normal rate, normal S1 and S2 and no carotid bruits   Abdomen: soft, non-tender, non-distended, normal bowel sounds, no masses or organomegaly      MD CONSULTS/ASSESSMENT AND PLAN:   IM Notes:   1. Acute on chronic respiratory failure with hypoxia - CXR stable right lung opacities unchanged compared to 8/24/2022. Concerning for pneumonia. Ceftriaxone/doxycycline day 3. Sputum culture pending. Urine antigens NEGATIVE/procalcitonin NEGATIVE. Tessalon dose increased, hydrocodan PRN. On 2 L via NC - back to baseline. Maintain SpO2 > 90%. Pulmonology input appreciated. 2. Pneumonia /recurrence vs worsening MAC infection-Had MAC treatment 2016 and 2020. Continue Ceftriaxone/doxycycline. Sputum culture pending. Procalcitonin 0.03, urine antigens negative. Continue Mucinex, Tessalon PRN, Hycodan PRN. Flutter valve. Pulmonology input appreciated - not convinced she has pneumonia. DC Albuterol - states it makes cough worse.         Pulmonology Noes:   Assessment:   Influenza A infection with acute exacerbation of bronchiectasis   + human meta pneumovirus    Concern with pneumonia vs recurrent MAC infection   Acute exacerbation of bronchiectasis secondary to flu a - non-compliant with current treatment regimen    History of MAC infection - off antibiotics            Plan:   Tamiflu not initiated, out of 48-hour window. Patient seen on 2 L nasal cannula, pulse ox 95%, baseline is room air. Ambulatory pulse ox to be completed on room air    Continue albuterol, CPT and flutter valve to aid in sputum expectoration    Will stop Rocephin and Doxycycline with negative PCT 0.05>0.03, no leukocytosis and afebrile. Sputum culture ordered awaiting specimen    Continue Tessalon to help with cough. On Hycodan per primary   Prednisone 40 mg daily x 5 days, day 3/5. Will complete       MEDICATIONS:   proventil 2.5 mg 4 times daily neb   tessalon cap 100 mg tid po   guaifenesin 400mg 4 times daily po   multivitamin 1 tab daily po   hycodan 10 ml every 4 hours prn po x2      ORDERS:   check pulse oximetry while ambulating   inpatient consult to case management   VS      PT/OT/SLP/CM ASSESSMENT OR NOTES:   CM Notes:   Social work / Discharge Planning:                   Discharge plan is home. Patient does not have continuous oxygen at home. Only a concentrator from [facility]. If continuous oxygen is needed for discharge, patient will need a new DME order.     Awaiting pulse ox testing        PA Inpatient recommendation by Alfonzo Kent RN       Review Status Review Entered   In Primary 12/15/2022 1300       Created By   Alfonzo Kent RN      Criteria Review   obs list upgrade    We recommend that the following pt's current hospitalization under OBSERVATION status is upgraded to INPATIENT    Name: Silvestre Toledo   : 1955   CSN: [id number]   INSURANCE: [facility] Medicare    Clinical summary Assessment:  · Influenza A infection with acute exacerbation of bronchiectasis  · + human meta pneumovirus   · Concern with pneumonia vs recurrent MAC infection  · Acute exacerbation of bronchiectasis secondary to flu a - non-compliant with current treatment regimen   · History of MAC infection - off antibiotics      Plan:  · Tamiflu not initiated, out of 48-hour window. · Patient seen on 2 L nasal cannula, pulse ox 95%, baseline is room air. Ambulatory pulse ox to be completed on room air   · Continue albuterol, CPT and flutter valve to aid in sputum expectoration   · Will stop Rocephin and Doxycycline with negative PCT 0.05>0.03, no leukocytosis and afebrile. Sputum culture ordered awaiting specimen   · Continue Tessalon to help with cough. On Hycodan per primary  · Prednisone 40 mg daily x 5 days, day 3/5.  Will complete 12/17    Vitals vss  Labs and Imaging reviewed  MCG criteria applies yes,   Comments Rec upgrade to inpt    This chart was reviewed at 11:35 AM 12/15/2022     [address] [place] [address]   CELL : [phone]

## 2022-12-19 NOTE — CARE COORDINATION
Care Transitions Outreach Attempt    Call within 2 business days of discharge: Yes   Attempted to reach patient for transitions of care follow up. Unable to reach patient. CTN left HIPAA compliant message requesting return call. CTN will attempt to reach again. START taking:  benzonatate (TESSALON)  guaiFENesin  predniSONE (Denis Lavender)  Start taking on: 2022  CHANGE how you take:  albuterol sulfate HFA (PROVENTIL;VENTOLIN;PROAIR)  albuterol (PROVENTIL)    Patient: Antonio Patient Patient : 1955 MRN: <[id number]>    Last Discharge  General acute hospital       Date Complaint Diagnosis Description Type Department Provider    22 Shortness of Breath Acute on chronic respiratory failure with hypoxia ([facility] [address] . .. ED to Hosp-Admission (Discharged) (ADMITTED) Deuce Parker MD; Grecia Ruvalcaba. .. Was this an external facility discharge? No Dis[place] [facility] [place]    Noted following upcoming appointments from discharge chart review:   [facility] follow up appointment(s):   Future Appointments   Date Time Provider Jorge Luis Lemon   2022  2:30 PM Chuyita Abbott MD [facility]     Non-[facility] follow up appointment(s):     Martha Klein.  Marlen Phillips [address] / [address] Transition Team  [phone]

## 2022-12-20 ENCOUNTER — CARE COORDINATION (OUTPATIENT)
Dept: CARE COORDINATION | Age: 67
End: 2022-12-20

## 2022-12-20 NOTE — PROGRESS NOTES
CLINICAL PHARMACY NOTE: MEDS TO BEDS    Total # of Prescriptions Filled: 3   The following medications were delivered to the patient:  Chest 400  Prednisone 20  Tessalon 200    Additional Documentation:

## 2022-12-20 NOTE — CARE COORDINATION
Care Transitions Outreach Attempt    Call within 2 business days of discharge: Yes   Attempted to reach patient for transitions of care follow up. Unable to reach patient. CTN left HIPAA compliant message requesting return call. CTN will sign off. START taking:  benzonatate (TESSALON)  guaiFENesin  predniSONE (Jarome Stephanie)  Start taking on: 2022  CHANGE how you take:  albuterol sulfate HFA (PROVENTIL;VENTOLIN;PROAIR)  albuterol (PROVENTIL)    Patient: Jacky Abad Patient : 1955 MRN: <[id number]>    Last Discharge  [facility]       Date Complaint Diagnosis Description Type Department Provider    22 Shortness of Breath Acute on chronic respiratory failure with hypoxia ([facility] [address] . .. ED to Hosp-Admission (Discharged) (ADMITTED) Luisa Joiner MD; Jono Vargas. .. Was this an external facility discharge? No Discharge Facility: [facility]    Noted following upcoming appointments from discharge chart review:   [facility] follow up appointment(s):   Future Appointments   Date Time Provider Jorge Luis Lemon   2022  2:30 PM Megha Simpson MD [facility]     Non-[facility] follow up appointment(s):     Marlen Sun  / [facility] Transition Team  [phone]

## 2022-12-21 ASSESSMENT — ENCOUNTER SYMPTOMS
COUGH: 1
SHORTNESS OF BREATH: 1

## 2022-12-22 ENCOUNTER — HOSPITAL ENCOUNTER (OUTPATIENT)
Dept: GENERAL RADIOLOGY | Age: 67
Discharge: HOME | End: 2022-12-24
Payer: MEDICARE

## 2022-12-22 ENCOUNTER — HOSPITAL ENCOUNTER (OUTPATIENT)
Age: 67
Discharge: HOME | End: 2022-12-24
Payer: MEDICARE

## 2022-12-22 ENCOUNTER — OFFICE VISIT (OUTPATIENT)
Dept: FAMILY MEDICINE CLINIC | Age: 67
End: 2022-12-22
Payer: MEDICARE

## 2022-12-22 ENCOUNTER — HOSPITAL ENCOUNTER (OUTPATIENT)
Age: 67
Discharge: HOME | End: 2022-12-22
Payer: MEDICARE

## 2022-12-22 VITALS
HEART RATE: 81 BPM | DIASTOLIC BLOOD PRESSURE: 70 MMHG | WEIGHT: 101 LBS | OXYGEN SATURATION: 93 % | SYSTOLIC BLOOD PRESSURE: 100 MMHG | BODY MASS INDEX: 18.58 KG/M2 | RESPIRATION RATE: 17 BRPM | TEMPERATURE: 98.4 F | HEIGHT: 62 IN

## 2022-12-22 DIAGNOSIS — A31.0 MYCOBACTERIUM AVIUM COMPLEX (HCC): Primary | ICD-10-CM

## 2022-12-22 DIAGNOSIS — A31.0 MYCOBACTERIUM AVIUM COMPLEX (HCC): ICD-10-CM

## 2022-12-22 DIAGNOSIS — J21.0 RSV (ACUTE BRONCHIOLITIS DUE TO RESPIRATORY SYNCYTIAL VIRUS): ICD-10-CM

## 2022-12-22 DIAGNOSIS — R93.89 ABNORMAL CXR: ICD-10-CM

## 2022-12-22 DIAGNOSIS — J44.9 CHRONIC OBSTRUCTIVE PULMONARY DISEASE, UNSPECIFIED COPD TYPE (HCC): ICD-10-CM

## 2022-12-22 DIAGNOSIS — J10.1 INFLUENZA A: ICD-10-CM

## 2022-12-22 DIAGNOSIS — E44.1 MILD PROTEIN-CALORIE MALNUTRITION (HCC): ICD-10-CM

## 2022-12-22 DIAGNOSIS — J47.0 BRONCHIECTASIS WITH ACUTE LOWER RESPIRATORY INFECTION (HCC): ICD-10-CM

## 2022-12-22 PROBLEM — E46 PROTEIN CALORIE MALNUTRITION (HCC): Status: ACTIVE | Noted: 2022-12-22

## 2022-12-22 LAB
ALBUMIN SERPL-MCNC: 4.1 G/DL (ref 3.5–5.2)
ALP BLD-CCNC: 68 U/L (ref 35–104)
ALT SERPL-CCNC: 16 U/L (ref 0–32)
ANION GAP SERPL CALCULATED.3IONS-SCNC: 10 MMOL/L (ref 7–16)
AST SERPL-CCNC: 20 U/L (ref 0–31)
BASOPHILS ABSOLUTE: 0.07 E9/L (ref 0–0.2)
BASOPHILS RELATIVE PERCENT: 0.7 % (ref 0–2)
BILIRUB SERPL-MCNC: 0.2 MG/DL (ref 0–1.2)
BUN BLDV-MCNC: 19 MG/DL (ref 6–23)
CALCIUM SERPL-MCNC: 9.5 MG/DL (ref 8.6–10.2)
CHLORIDE BLD-SCNC: 100 MMOL/L (ref 98–107)
CO2: 29 MMOL/L (ref 22–29)
CREAT SERPL-MCNC: 1 MG/DL (ref 0.5–1)
EOSINOPHILS ABSOLUTE: 0.31 E9/L (ref 0.05–0.5)
EOSINOPHILS RELATIVE PERCENT: 3.3 % (ref 0–6)
GFR SERPL CREATININE-BSD FRML MDRD: >60 ML/MIN/1.73
GLUCOSE BLD-MCNC: 92 MG/DL (ref 74–99)
HCT VFR BLD CALC: 40.8 % (ref 34–48)
HEMOGLOBIN: 12.9 G/DL (ref 11.5–15.5)
IMMATURE GRANULOCYTES #: 0.02 E9/L
IMMATURE GRANULOCYTES %: 0.2 % (ref 0–5)
LYMPHOCYTES ABSOLUTE: 1.78 E9/L (ref 1.5–4)
LYMPHOCYTES RELATIVE PERCENT: 18.8 % (ref 20–42)
MCH RBC QN AUTO: 29.2 PG (ref 26–35)
MCHC RBC AUTO-ENTMCNC: 31.6 % (ref 32–34.5)
MCV RBC AUTO: 92.3 FL (ref 80–99.9)
MONOCYTES ABSOLUTE: 0.76 E9/L (ref 0.1–0.95)
MONOCYTES RELATIVE PERCENT: 8 % (ref 2–12)
NEUTROPHILS ABSOLUTE: 6.53 E9/L (ref 1.8–7.3)
NEUTROPHILS RELATIVE PERCENT: 69 % (ref 43–80)
PDW BLD-RTO: 13.3 FL (ref 11.5–15)
PLATELET # BLD: 349 E9/L (ref 130–450)
PMV BLD AUTO: 8.8 FL (ref 7–12)
POTASSIUM SERPL-SCNC: 4 MMOL/L (ref 3.5–5)
RBC # BLD: 4.42 E12/L (ref 3.5–5.5)
SODIUM BLD-SCNC: 139 MMOL/L (ref 132–146)
TOTAL PROTEIN: 7.8 G/DL (ref 6.4–8.3)
WBC # BLD: 9.5 E9/L (ref 4.5–11.5)

## 2022-12-22 PROCEDURE — 99215 OFFICE O/P EST HI 40 MIN: CPT | Performed by: FAMILY MEDICINE

## 2022-12-22 PROCEDURE — 1123F ACP DISCUSS/DSCN MKR DOCD: CPT | Performed by: FAMILY MEDICINE

## 2022-12-22 PROCEDURE — 85025 COMPLETE CBC W/AUTO DIFF WBC: CPT

## 2022-12-22 PROCEDURE — 36415 COLL VENOUS BLD VENIPUNCTURE: CPT

## 2022-12-22 PROCEDURE — 80053 COMPREHEN METABOLIC PANEL: CPT

## 2022-12-22 PROCEDURE — 71046 X-RAY EXAM CHEST 2 VIEWS: CPT

## 2022-12-22 RX ORDER — METHYLPREDNISOLONE 4 MG/1
TABLET ORAL
Qty: 1 KIT | Refills: 0 | Status: SHIPPED | OUTPATIENT
Start: 2022-12-22 | End: 2022-12-28

## 2022-12-22 NOTE — PROGRESS NOTES
[facility]        SUBJECTIVE:  CC: had concerns including Follow-Up from Hospital (Pt here for a follow up after ED admission from 22 till 22. ). HPI:  Jhony Kong is a female 74 y.o. presented to the clinic for to follow up from her recent hospitalization -  for Flu A. She reports feeling better than she felt before. She finished her steroid and Mucinex. She works in the [employer] at [employer]. She has not been back to work yet, because she is not feeling up too it. She is using her Flutter Valve. Tiffanie Mcleod follows with Pulmonology, but has not been seen for follow up since recent illness. She is using prn Oxygen with exertion. Declines going back to the ED. Review of Systems   Constitutional:  Negative for appetite change, fatigue and fever. Respiratory:  Positive for cough, shortness of breath and wheezing. Cardiovascular:  Negative for chest pain and palpitations. Gastrointestinal:  Negative for abdominal pain, constipation, diarrhea, nausea and vomiting. Outpatient Medications Marked as Taking for the 22 encounter (Office Visit) with Ros Shaver MD   Medication Sig Dispense Refill    methylPREDNISolone (MEDROL DOSEPACK) 4 MG tablet Take by mouth.  1 kit 0    [] benzonatate (TESSALON) 200 MG capsule Take 1 capsule by mouth 3 times daily as needed for Cough 21 capsule 0    albuterol (PROVENTIL) (2.5 MG/3ML) 0.083% nebulizer solution Take 3 mLs by nebulization in the morning and at bedtime 120 each 3    albuterol (PROVENTIL) (2.5 MG/3ML) 0.083% nebulizer solution Take 3 mLs by nebulization every 6 hours as needed for Wheezing 120 each 3    albuterol sulfate HFA (PROVENTIL;VENTOLIN;PROAIR) 108 (90 Base) MCG/ACT inhaler INHALE 2 PUFFS INTO THE LUNGS EVERY 6-8 HOURS AS NEEDED FOR WHEEZING 6.7 each 0    acetaminophen (TYLENOL) 500 MG tablet Take 500 mg by mouth daily as needed for Pain      Multiple Vitamins-Minerals (ALIVE MULTI-VITAMIN PO) Take 1 tablet by mouth daily      Multiple Vitamins-Minerals (HAIR SKIN AND NAILS FORMULA PO) Take 1 tablet by mouth daily      vilazodone HCl (VIIBRYD) 40 MG TABS Take 40 mg by mouth daily         I have reviewed all pertinent PMHx, PSHx, FamHx, SocialHx, medications, and allergies and updated history as appropriate. OBJECTIVE    VS: /70   Pulse 81   Temp 98.4 °F (36.9 °C) (Temporal)   Resp 17   Ht 5' 2\" (1.575 m)   Wt 101 lb (45.8 kg)   LMP  (LMP Unknown)   SpO2 93%   Breastfeeding No   BMI 18.47 kg/m²   Physical Exam  Nursing note reviewed: frail elderly female. Constitutional:       General: She is not in acute distress. Appearance: She is well-developed. She is not diaphoretic. HENT:      Head: Normocephalic and atraumatic. Eyes:      Conjunctiva/sclera: Conjunctivae normal.      Pupils: Pupils are equal, round, and reactive to light. Cardiovascular:      Rate and Rhythm: Normal rate and regular rhythm. Pulmonary:      Effort: Pulmonary effort is normal.      Breath sounds: Normal breath sounds. Abdominal:      General: Bowel sounds are normal. There is no distension. Palpations: Abdomen is soft. Tenderness: There is no abdominal tenderness. Hernia: No hernia is present. Musculoskeletal:      Cervical back: Normal range of motion and neck supple. Skin:     General: Skin is warm and dry. Neurological:      Mental Status: She is alert and oriented to person, place, and time. ASSESSMENT/PLAN:  1. Mycobacterium avium complex ([facility] [address]  Encourage Mac treatment with ID & Pulm. - methylPREDNISolone (MEDROL DOSEPACK) 4 MG tablet; Take by mouth. Dispense: 1 kit; Refill: 0  - XR CHEST STANDARD (2 VW); Future  - CBC with Auto Differential; Future  - Comprehensive Metabolic Panel; Future  - Path Review, Smear; Future  - [facility] - Pulmonary Rehab, [facility]    2. Influenza A  - methylPREDNISolone (MEDROL DOSEPACK) 4 MG tablet; Take by mouth. Dispense: 1 kit; Refill: 0  - XR CHEST STANDARD (2 VW); Future  - [facility] - Pulmonary [facility]    3. RSV (acute bronchiolitis due to respiratory syncytial virus)  - methylPREDNISolone (MEDROL DOSEPACK) 4 MG tablet; Take by mouth. Dispense: 1 kit; Refill: 0  - XR CHEST STANDARD (2 VW); Future  - [facility] - Pulmonary [facility]    4. Bronchiectasis with acute lower respiratory infection ([facility] [address]    5. Chronic obstructive pulmonary disease, unspecified COPD type ([facility] [address]    6. Mild protein-calorie malnutrition ([facility] [address]    7. Abnormal CXR  - CT CHEST WO CONTRAST; Future    la paperwork pending. > 40 minutes spent on visit. I have reviewed my findings and recommendations with Teresa Colby MD  12/28/2022 11:22 PM  Return in about 1 week (around 12/29/2022). Counseled regarding above diagnosis, including possible risks and complications, especially if left uncontrolled. Patient counseled on red flag symptoms and if they occur to go to the ED. Discussed medications risk/benefits and possible side effects and alternatives to treatment. Patient and/or guardian verbalizes understanding, agrees, feels comfortable with and wishes to proceed with above treatment plan. Advised patient regarding importance of keeping up with recommended health maintenance and to schedule as soon as possible if overdue, as this is important in assessing for undiagnosed pathology, especially cancer, as well as protecting against potentially harmful/life threatening disease. Patient and/or guardian verbalizes understanding and agrees with above counseling, assessment and plan. All questions answered. Please note this report has been partially produced using speech recognition software  and may contain errors related to that system including grammar, punctuation and spelling as well as words and phrases that may seem inappropriate.  If there are questions or concerns please feel free to contact me to clarify.

## 2022-12-23 LAB — PATHOLOGIST REVIEW: NORMAL

## 2022-12-28 ASSESSMENT — ENCOUNTER SYMPTOMS
WHEEZING: 1
ABDOMINAL PAIN: 0
SHORTNESS OF BREATH: 1
COUGH: 1
VOMITING: 0
DIARRHEA: 0
NAUSEA: 0
CONSTIPATION: 0

## 2023-01-03 ENCOUNTER — OFFICE VISIT (OUTPATIENT)
Dept: FAMILY MEDICINE CLINIC | Age: 68
End: 2023-01-03
Payer: MEDICARE

## 2023-01-03 VITALS
BODY MASS INDEX: 19.32 KG/M2 | WEIGHT: 105 LBS | OXYGEN SATURATION: 94 % | HEIGHT: 62 IN | TEMPERATURE: 97.6 F | RESPIRATION RATE: 18 BRPM | DIASTOLIC BLOOD PRESSURE: 76 MMHG | HEART RATE: 76 BPM | SYSTOLIC BLOOD PRESSURE: 102 MMHG

## 2023-01-03 DIAGNOSIS — J47.9 ADULT BRONCHIECTASIS (HCC): ICD-10-CM

## 2023-01-03 DIAGNOSIS — E44.1 MILD PROTEIN-CALORIE MALNUTRITION (HCC): ICD-10-CM

## 2023-01-03 DIAGNOSIS — J44.9 CHRONIC OBSTRUCTIVE PULMONARY DISEASE, UNSPECIFIED COPD TYPE (HCC): Primary | ICD-10-CM

## 2023-01-03 DIAGNOSIS — I76 SEPTIC EMBOLISM (HCC): ICD-10-CM

## 2023-01-03 DIAGNOSIS — A31.0 MYCOBACTERIUM AVIUM COMPLEX (HCC): ICD-10-CM

## 2023-01-03 DIAGNOSIS — F32.0 MAJOR DEPRESSIVE DISORDER, SINGLE EPISODE, MILD (HCC): ICD-10-CM

## 2023-01-03 PROCEDURE — 1123F ACP DISCUSS/DSCN MKR DOCD: CPT | Performed by: FAMILY MEDICINE

## 2023-01-03 PROCEDURE — 99214 OFFICE O/P EST MOD 30 MIN: CPT | Performed by: FAMILY MEDICINE

## 2023-01-03 ASSESSMENT — ENCOUNTER SYMPTOMS
WHEEZING: 0
ABDOMINAL PAIN: 0
COUGH: 1
DIARRHEA: 0
SHORTNESS OF BREATH: 1
CONSTIPATION: 0
NAUSEA: 0
VOMITING: 0

## 2023-01-03 ASSESSMENT — PATIENT HEALTH QUESTIONNAIRE - PHQ9
2. FEELING DOWN, DEPRESSED OR HOPELESS: 0
SUM OF ALL RESPONSES TO PHQ9 QUESTIONS 1 & 2: 0
SUM OF ALL RESPONSES TO PHQ QUESTIONS 1-9: 0
1. LITTLE INTEREST OR PLEASURE IN DOING THINGS: 0
SUM OF ALL RESPONSES TO PHQ QUESTIONS 1-9: 0

## 2023-01-03 NOTE — PROGRESS NOTES
[facility])  Family Medicine Outpatient        SUBJECTIVE:  CC: had concerns including Follow-up (Pt here for a 1 week follow up on her [address] ). HPI:  Prateek Rondon is a female 74 y.o. presented to the clinic for follow up on her recent illness with rsv and flu superimposed on her mac and copd. She was in the hospital 12/13-12/16. She was treated with a steroid burst last appointment. She reports feeling all better now, that the steroids did well. She is using her Albuterol approximately once a day and reports it is helps. She is back to her baseline. Review of Systems   Constitutional:  Negative for appetite change, fatigue and fever. Respiratory:  Positive for cough (chronic) and shortness of breath (chronic). Negative for wheezing. Cardiovascular:  Negative for chest pain and palpitations. Gastrointestinal:  Negative for abdominal pain, constipation, diarrhea, nausea and vomiting.      Outpatient Medications Marked as Taking for the 1/3/23 encounter (Office Visit) with Katya Zayas MD   Medication Sig Dispense Refill    albuterol (PROVENTIL) (2.5 MG/3ML) 0.083% nebulizer solution Take 3 mLs by nebulization in the morning and at bedtime 120 each 3    albuterol (PROVENTIL) (2.5 MG/3ML) 0.083% nebulizer solution Take 3 mLs by nebulization every 6 hours as needed for Wheezing 120 each 3    albuterol sulfate HFA (PROVENTIL;VENTOLIN;PROAIR) 108 (90 Base) MCG/ACT inhaler INHALE 2 PUFFS INTO THE LUNGS EVERY 6-8 HOURS AS NEEDED FOR WHEEZING 6.7 each 0    acetaminophen (TYLENOL) 500 MG tablet Take 500 mg by mouth daily as needed for Pain      Multiple Vitamins-Minerals (ALIVE MULTI-VITAMIN PO) Take 1 tablet by mouth daily      Multiple Vitamins-Minerals (HAIR SKIN AND NAILS FORMULA PO) Take 1 tablet by mouth daily      vilazodone HCl (VIIBRYD) 40 MG TABS Take 40 mg by mouth daily         I have reviewed all pertinent PMHx, PSHx, FamHx, SocialHx, medications, and allergies and updated history as appropriate. OBJECTIVE    VS: /76   Pulse 76   Temp 97.6 °F (36.4 °C) (Temporal)   Resp 18   Ht 5' 2\" (1.575 m)   Wt 105 lb (47.6 kg)   LMP  (LMP Unknown)   SpO2 94%   Breastfeeding No   BMI 19.20 kg/m²   Physical Exam  Constitutional:       General: She is not in acute distress. Appearance: She is well-developed. She is not diaphoretic. HENT:      Head: Normocephalic and atraumatic. Eyes:      Conjunctiva/sclera: Conjunctivae normal.      Pupils: Pupils are equal, round, and reactive to light. Cardiovascular:      Rate and Rhythm: Normal rate and regular rhythm. Pulmonary:      Effort: Pulmonary effort is normal.      Breath sounds: No stridor. Wheezing (diffuse inspiratory) present. No rhonchi or rales. Abdominal:      General: Bowel sounds are normal. There is no distension. Palpations: Abdomen is soft. Tenderness: There is no abdominal tenderness. Hernia: No hernia is present. Musculoskeletal:      Cervical back: Normal range of motion and neck supple. Skin:     General: Skin is warm and dry. Neurological:      Mental Status: She is alert and oriented to person, place, and time. ASSESSMENT/PLAN:  1. Chronic obstructive pulmonary disease, unspecified COPD type [facility])  - [facility] Pulmonary Rehab [facility]    2. Mycobacterium avium complex [facility])  Patient encouraged to reconsider treatment for MAC, but declines. She is aware/understands/accepts increased risk of morbidity without treatment for MAC. Follow up with ID and Pulmonology as recommended. - [facility] - [facility]    3. Adult bronchiectasis ([address]    4. Septic embolism ([address]  historic    5. Major depressive disorder, single episode, mild (HCC)  Stable. Continue current regimen. 6. Mild protein-calorie malnutrition ([address]    With recent Rsv and Flu infection. Patient is back to baseline. Did not go to Pulmonary rehab to date, secondary to location. [facility]  New referral placed. Patient is ok to go back to work at this time. Note written. Patient has updated CT Chest scheduled 1/6 and follow up with Dr. Deneen Mcdonald 1/10. I have reviewed my findings and recommendations with Liss Singh MD  1/3/2023 1:46 PM  Return in about 5 weeks (around 2/7/2023) for medicare wellness. Counseled regarding above diagnosis, including possible risks and complications, especially if left uncontrolled. Patient counseled on red flag symptoms and if they occur to go to the ED. Discussed medications risk/benefits and possible side effects and alternatives to treatment. Patient and/or guardian verbalizes understanding, agrees, feels comfortable with and wishes to proceed with above treatment plan. Advised patient regarding importance of keeping up with recommended health maintenance and to schedule as soon as possible if overdue, as this is important in assessing for undiagnosed pathology, especially cancer, as well as protecting against potentially harmful/life threatening disease. Patient and/or guardian verbalizes understanding and agrees with above counseling, assessment and plan. All questions answered. Please note this report has been partially produced using speech recognition software  and may contain errors related to that system including grammar, punctuation and spelling as well as words and phrases that may seem inappropriate. If there are questions or concerns please feel free to contact me to clarify.

## 2023-01-03 NOTE — LETTER
[facility]  [address] [facility] [address]  Phone: [phone]  Fax: [phone]    Jason Steven MD        January 3, 2023     Patient: Whitley Chang   YOB: 1955   Date of Visit: 1/3/2023       To Whom It May Concern: It is my medical opinion that Whitley Chang is at her baseline and may return to work 1/4/23. Please excuse her from work 12/13-1/3. If you have any questions or concerns, please don't hesitate to call.     Sincerely,        Jason Steven MD

## 2023-01-03 NOTE — LETTER
[facility]  [facility] [address]  Phone: [phone]  Fax: [phone]    Narendra Gotti MD    January 3, 2023     Narendra Gotti MD  [address] [place] [id number]    Patient: Harriet Wheatley   MR Number: [id number]   YOB: 1955   Date of Visit: 1/3/2023       Dear Candy Scott [facility]: Thank you for referring Harriet Wheatley to me for evaluation/treatment. Below are the relevant portions of my assessment and plan of care. If you have questions, please do not hesitate to call me. I look forward to following Danielle Casillas along with you.     Sincerely,      Narendra Gotti MD

## 2023-01-06 ENCOUNTER — HOSPITAL ENCOUNTER (OUTPATIENT)
Dept: CT IMAGING | Age: 68
End: 2023-01-06
Payer: MEDICARE

## 2023-01-06 DIAGNOSIS — R93.89 ABNORMAL CXR: ICD-10-CM

## 2023-01-06 PROCEDURE — 71250 CT THORAX DX C-: CPT

## 2023-01-09 ENCOUNTER — OFFICE VISIT (OUTPATIENT)
Dept: PULMONOLOGY | Age: 68
End: 2023-01-09
Payer: MEDICARE

## 2023-01-09 VITALS
BODY MASS INDEX: 18.95 KG/M2 | DIASTOLIC BLOOD PRESSURE: 54 MMHG | OXYGEN SATURATION: 95 % | HEIGHT: 62 IN | RESPIRATION RATE: 18 BRPM | SYSTOLIC BLOOD PRESSURE: 107 MMHG | WEIGHT: 103 LBS | HEART RATE: 72 BPM

## 2023-01-09 DIAGNOSIS — A31.0 MYCOBACTERIUM AVIUM COMPLEX (HCC): ICD-10-CM

## 2023-01-09 DIAGNOSIS — J47.9 BRONCHIECTASIS WITHOUT COMPLICATION (HCC): Primary | ICD-10-CM

## 2023-01-09 PROCEDURE — 99214 OFFICE O/P EST MOD 30 MIN: CPT | Performed by: INTERNAL MEDICINE

## 2023-01-09 PROCEDURE — 1123F ACP DISCUSS/DSCN MKR DOCD: CPT | Performed by: INTERNAL MEDICINE

## 2023-01-09 RX ORDER — ALBUTEROL SULFATE 2.5 MG/3ML
2.5 SOLUTION RESPIRATORY (INHALATION) 2 TIMES DAILY
Qty: 120 EACH | Refills: 3 | Status: SHIPPED | OUTPATIENT
Start: 2023-01-09

## 2023-01-09 RX ORDER — ALBUTEROL SULFATE 90 UG/1
2 AEROSOL, METERED RESPIRATORY (INHALATION) 4 TIMES DAILY PRN
Qty: 54 G | Refills: 1 | Status: SHIPPED | OUTPATIENT
Start: 2023-01-09

## 2023-01-09 NOTE — PROGRESS NOTES
Vahid Leigh     HISTORY OF PRESENT ILLNESS:    Neri Walsh is a 74y.o. year old female here for evaluation of cough, shortness of breath, bronchiectasis, history of MAC. Patient seen and examined in clinic. She is accompanied by her daughter Mg Cary. Unfortunately she is a poor historian she does report to me that she was initially diagnosed with MAC many years ago she has been treated with multiple antibiotics at that time subsequently was told the the MAC and gone away it then recurred at which point she was again started on antibiotics sounds like triple therapy and had significant difficulty with nausea and vomiting quit eating and drinking. Then presented to the hospital with an acute kidney injury which actually required dialysis. Since that time in 2020 she was seen once by Dr. Wolf Montez as a virtual visit. She has not followed up with pulmonary. She has declined any antibiotic therapy for her MAC. Most recently she had developed COVID-19 at that time she was prescribed Decadron, doxycycline, vitamin D, she was prescribed Paxlovid but did not take this. She reports that since being diagnosed with COVID over [facility] she is still having symptoms which include coughing, shortness of breath, and chills. She reports she is coughing a lot more. She is not using her nebulizers. Her appetite is extremely poor and she is losing weight. She is not currently vaccinated against COVID-19. I discussed multiple options with the patient and did discuss with her that I am concerned that this may be a recurrence or worsening of her MAC/bronchiectasis. We discussed increased airway hygiene methods including turner coughing, chest vest, and increased use of inhalers. At the time of our appointment today she declines chest vest therapy as she feels she will not be able to tolerate this due to her osteoporosis.   She is declining use of nebulizers due to feeling that they will \"make her sick. She reports being unable to do half coughing and unable or willing to use Acapella. She also reports not wanting to go back on any antibiotics as she feels that these caused renal failure in the past.    After extensive discussion with the patient and her daughter she was agreeable for a bronchoscopy. We did discuss using saline nebs using a flutter valve. Also increasing nutrition with caloric supplement. She is agreeable to this. Updated HPI as of 8/29/2022:  Patient seen and examined. Since last seen she was started on Incruse by her pcp but is not taking it because she was worried about possible renal side effects. She saw Dr. Marietta Waddell once in the ID office and was then seen by a pa afterwards. She continues to not be on any treatment for MAC as she is concerned about the potential renal side effects of the medications. She continues to be short of breath, continued to have significant coughing and wheezing. She did have a cxr completed on 8/22/22. She does have oxygen prescribed but is only wearing it intermittently at night and not during the day at all. Updated HPI as of January 9, 2023. Patient seen for hospital follow-up. She was admitted at [facility] from December 13 through December 16 due to acute hypoxemic respiratory failure and was found to have both influenza A and rhinovirus. Currently she reports that she is continuing to have a harsh productive cough sometimes this is flecked with blood. She denies any sandy hemoptysis. She is using her flutter valve but only once a day. She is using her albuterol inhaler intermittently and feels that this does make her cough. She is also trying to keep weight on reports that at home on the scale she weighs 100 pounds. She was referred to pulmonary rehab in [place] however would like to go to a location closer to her.   She is wearing her oxygen at night reports that she is not needing it during the day and that her oxygen saturation is staying above 90%      See below for summary of extensive review of records in Care everywhere and Epic:   CT Chest  1. Bilateral centrilobular nodular and tree-in-bud opacities with atelectasis and bronchiectasis in the right middle lobe and right lower lobe. Findings are most likely secondary to chronic infectious or   inflammatory process. 2.  Right lower lobe nodular opacities measuring up to 1.5 cm with central cavitation/focal airway dilation are also likely secondary to the same chronic infectious or inflammatory process. Recommend short interval follow-up chest CT (3 months) to assess stability. : [facility]  Transcribe Date/Time: Nov 19 2015 10:59A  Admitted at [facility] Oct-Nov 2016 at [facility] due to [address]. Discharged on Azithromycin, rifabutin, prednisone. She was treated for 12 months. PFT 11/3/17 showed: Spirometry shows no obstruction. The reduced FVC suggests restriction. There is no significant bronchodilator response. The TLC is normal.  The RV and RV/TLC are elevated indicating air trapping. The diffusing capacity is normal.  From [id number] was followed by pulm at [facility] then again was seen in [place]. Sputum cx Sept 2018 showed pseudomonas and she was treated with Levaquin. CT of the chest at that time showed persistent fibrosis and bronchiectasis of the right middle lobe. Multiple cavitary nodules. She was treated inpatient in [place] in May of 2019 due to pseudomonas again. BAL 5/29/2019 was positive for KAMALA. She was again treated with azithromycin, rifampin, ethambutol. Spirometry 6/2019: Spirometry shows no obstruction. The reduced FVC suggests restriction. Recommend lung volumes if clinically indicated. The diffusing capacity corrected for hemoglobin is normal.  She was admitted to the hospital locally 12/28/2019 due to increased shortness of breath she was initially seen by Dr. Tarik Liu and Dr. Delgado Milan.  She tested positive for both RSV . She was discharged on 1/1/2020  Repeat Cx on 2/17 again showed MAC. April 20, 2020 she was then supposed to be started on Amikacin IV 3 times a week, rifampin, ethambutol, and clarithromycin. She did not end up taking the amikacin and per notes stopped the po antibiotics due to feeling ill. She did start amikacin inhaled. Plan was then changed to amikacin inhaled with ethambutol and rifampin three times a week. Presented to the ER on 5/20 due to intractable nausea, she was found to have MATTEO with hyperkalemia with CR 12.8 and . During this admission she did require HD. She was discharged on 5/31/20  Virtual visit with Dr. Refugia Najjar 6-2019  Admitted 1/5/2021 due to Septic Shock, She was treated for community acquired pna and discharged on 1/10/21. She has not been seen by pulmonary since then. She did test positive foe COVID-19 on 4/20/2022. She was treated with decadron, doxycycline, vitamin D. Paxlovid was also ordered put not taken.      ALLERGIES:    Allergies   Allergen Reactions    Rifampin Other (See Comments)     States this medicine caused Renal Failure       PAST MEDICAL HISTORY:       Diagnosis Date    Bronchiectasis ([facility] [address]     COPD (chronic obstructive pulmonary disease) ([facility])     Depression     Hx of blood clots     lung    Mycobacterium avium complex ([facility])        MEDICATIONS:   Current Outpatient Medications   Medication Sig Dispense Refill    albuterol (PROVENTIL) (2.5 MG/3ML) 0.083% nebulizer solution Take 3 mLs by nebulization in the morning and at bedtime 120 each 3    albuterol (PROVENTIL) (2.5 MG/3ML) 0.083% nebulizer solution Take 3 mLs by nebulization every 6 hours as needed for Wheezing 120 each 3    albuterol sulfate HFA (PROVENTIL;VENTOLIN;PROAIR) 108 (90 Base) MCG/ACT inhaler INHALE 2 PUFFS INTO THE LUNGS EVERY 6-8 HOURS AS NEEDED FOR WHEEZING 6.7 each 0    acetaminophen (TYLENOL) 500 MG tablet Take 500 mg by mouth daily as needed for Pain      Multiple Vitamins-Minerals (ALIVE MULTI-VITAMIN PO) Take 1 tablet by mouth daily      Multiple Vitamins-Minerals (HAIR SKIN AND NAILS FORMULA PO) Take 1 tablet by mouth daily      vilazodone HCl (VIIBRYD) 40 MG TABS Take 40 mg by mouth daily       No current facility-administered medications for this visit. SOCIAL AND OCCUPATIONAL HEALTH: The patient is a former smoker. She quit smoking around the age of 38. Has a 33-pack-year smoking history. Had a prior PE. Denies any known inhalational exposures. No recent travel. SURGICAL HISTORY:   Past Surgical History:   Procedure Laterality Date    BRONCHOSCOPY N/A 12/30/2019    BRONCHOSCOPY ALVEOLAR LAVAGE performed by Grant Isaac MD at [address] N/A 6/14/2022    BRONCHOSCOPY DIAGNOSTIC OR CELL [address] ONLY-BAL performed by Bella Gutiérrez DO at [facility] ENDOSCOPY       FAMILY HISTORY: No family history of cancer, blood clots     REVIEW OF SYSTEMS:  Constitutional: Currently denies fevers, chills, night sweats. Skin: No rashes or lesions  EENT: No change in vision, change in hearing, change in taste, change in smell  Cardiovascular: Denies chest pain, chest pressure, palpitations  Respiration: Positive for productive cough, shortness of breath intermittently, wheezing. Denies sandy hemoptysis  Gastrointestinal: Denies nausea, vomiting, diarrhea. Musculoskeletal: Denies joint or muscle pain  Neurological: Denies syncope, headache, seizures  Psychological: Denies anxiety or depression  Endocrine: Denies polyuria polydipsia  Hematopoietic/lymphatic: Denies easy bruising        PHYSICAL EXAMINATION:  Constitutional: Patient is thin and frail appearing    EENT: PERRL, EOMI, no oropharyngeal erythema. No palpable adenopathy  Neck: Trachea and thyroid midline  Respiratory: Breath sounds clear to auscultation on today's examination. No use of accessory muscles.   Cardiovascular: Regular rate and rhythm, no murmurs rubs or gallops  Pulses: Equal bilaterally  Abdomen: Soft nontender bowel sounds present  Lymphatic: No palpable adenopathy  Musculoskeletal: Gait steady  Extremities: Clubbing present. No cyanosis or edema  Skin: No rashes or lesions  Neurological/Psychiatric: Neurologically intact, no focal deficits. Affect appropriate    DATA: Spirometry was not completed in clinic today. However, pulmonary function testing on June 2, 2022 showed FVC of 1.52 L which is 54% of predicted. FEV1 of 1.12 L which is 50% of predicted. FEV1 FVC ratio of 73. There is 11% increase in FVC. MVV is 56 which is 65% of predicted. Lung volumes show SVC of 1.78 L which is 63% of predicted. RV is 2.63 L which is 132% of predicted. Total lung capacity is 4.41 L which is 92% of predicted. Diffusion is 5.93 which is 27% of predicted corrected for alveolar ventilation to 73% of predicted. Flow volume loop is consistent with obstruction. CT chest May 5, 2022: Shows patchy bilateral airspace disease with cavitary lesions. Multiple cavitary lung lesions    IMPRESSION:       1. Bronchiectasis  2. History of Mycobacterium avium infections- currently active and not being treated. 3.  History of multiple Pseudomonas infections   4. Protein calorie malnutrition  5. Chronic cough  6. COPD  7. Former tobacco use  8. Need for vaccination  9. Recent influenza A and rhinovirus infection  10. History of acute kidney injury requiring renal replacement therapy  11. Anxiety/depression              PLAN:        Recently admitted at [facility] for influenza A along with rhinovirus. I did review all of the records from her hospitalization. At that time she did receive Tamiflu multiple nebulizer treatments and did briefly require oxygen while inpatient. 1.  Patient is more agreeable to aggressive pulmonary hygiene on this visit.   I have given her instructions to use the albuterol nebulizer immediately followed by the flutter valve with at least 10 breaths on the flutter valve at least twice a day. She has agreed to try this. 2.  Prescriptions placed for Incruse inhaler, albuterol nebulizer, and albuterol rescue inhaler. 3.  Again discussed therapy for MAC. Patient declines at this time due to prior history of renal failure while on rifampin. 4.  Encouraged high calorie diet  5. Discussed importance of staying as active as possible with patient. I did place another referral for pulmonary rehab as she felt that the location [place] far for her. Also did discuss doing upper body exercises with light weight resistance bands in the interim. She is agreeable to this. 6.  Patient continues to decline vaccination for influenza  7. Patient to follow-up in my office in 3 months. Encouraged in the interim to call should she develop any issues with her breathing. I hope this updates you on my evaluation and clinical thinking. Thank you for allowing me to participate in his care.      Sincerely,        Yony Blanchard.  Office: [phone]  Fax: [phone]

## 2023-01-09 NOTE — PATIENT INSTRUCTIONS
[address] [facility]  [address] [place] [address] CARE [place], [address] [facility]  Office: [phone]      Your were seen in the office today for Bronchiectasis, MAC      We  did not make changes to your medications today. Refills ordered for Incruse, albuterol, and albuterol nebulizer    For airway hygiene. First use the albuterol nebulizer, then do at least 10 breaths with the flutter valve. Please do this twice a day. Exercise bands recommended    Referral placed for pulmonary rehab. Testing ordered today was none      Vaccines recommended none              Please do not hesitate to call the office with any questions.

## 2023-01-10 ENCOUNTER — TELEPHONE (OUTPATIENT)
Dept: CARDIAC REHAB | Age: 68
End: 2023-01-10

## 2023-01-10 DIAGNOSIS — J47.9 BRONCHIECTASIS, NON-TUBERCULOUS (HCC): Primary | ICD-10-CM

## 2023-01-10 DIAGNOSIS — J98.4 LUNG DENSITY ON X-RAY: ICD-10-CM

## 2023-01-10 NOTE — TELEPHONE ENCOUNTER
First attempt to contact pt regarding enrollment into outpatient pulmonary rehab.  LVM for pt to return call

## 2023-01-24 ENCOUNTER — FOLLOWUP TELEPHONE ENCOUNTER (OUTPATIENT)
Dept: PULMONOLOGY | Age: 68
End: 2023-01-24

## 2023-01-24 NOTE — TELEPHONE ENCOUNTER
2nd attempt to contact pt regarding enrollment into outpatient pulmonary rehab. D/t to pulmonary rehab not being a covered benefit at this time, pt would like to think about the self pay program and will call us back.

## 2023-01-31 ENCOUNTER — OFFICE VISIT (OUTPATIENT)
Dept: FAMILY MEDICINE CLINIC | Age: 68
End: 2023-01-31

## 2023-01-31 VITALS
TEMPERATURE: 98.5 F | BODY MASS INDEX: 18.95 KG/M2 | RESPIRATION RATE: 17 BRPM | HEART RATE: 72 BPM | OXYGEN SATURATION: 95 % | WEIGHT: 103 LBS | SYSTOLIC BLOOD PRESSURE: 118 MMHG | DIASTOLIC BLOOD PRESSURE: 70 MMHG | HEIGHT: 62 IN

## 2023-01-31 DIAGNOSIS — J02.9 SORE THROAT: Primary | ICD-10-CM

## 2023-01-31 LAB
Lab: NORMAL
PERFORMING INSTRUMENT: NORMAL
QC PASS/FAIL: NORMAL
S PYO AG THROAT QL: NORMAL
SARS-COV-2, POC: NORMAL

## 2023-01-31 RX ORDER — AMOXICILLIN AND CLAVULANATE POTASSIUM 875; 125 MG/1; MG/1
1 TABLET, FILM COATED ORAL 2 TIMES DAILY WITH MEALS
Qty: 20 TABLET | Refills: 0 | Status: SHIPPED
Start: 2023-01-31 | End: 2023-02-03

## 2023-01-31 NOTE — PROGRESS NOTES
[facility])  Family Medicine Outpatient        SUBJECTIVE:  CC: had concerns including Pharyngitis (Pt presents to the office for a severe sore throat. Pt states this has been going on for about a week. Admits to a cough and congestion as well. ). HPI:  Angel Chakraborty is a female 74 y.o. presented to the clinic for concerns of a severe sore throat. Her symptoms started approximately a week ago. She also has some sinus congestion. Review of Systems   Constitutional:  Negative for appetite change, fatigue and fever. HENT:  Positive for congestion and sore throat. Negative for rhinorrhea and sinus pressure. Respiratory:  Negative for cough, shortness of breath and wheezing. Cardiovascular:  Negative for chest pain and palpitations. Gastrointestinal:  Negative for abdominal pain, constipation, diarrhea, nausea and vomiting.      Outpatient Medications Marked as Taking for the 1/31/23 encounter (Office Visit) with Marie Chau MD   Medication Sig Dispense Refill    [DISCONTINUED] amoxicillin-clavulanate (AUGMENTIN) 875-125 MG per tablet Take 1 tablet by mouth 2 times daily (with meals) for 10 days 20 tablet 0    Umeclidinium Bromide 62.5 MCG/ACT AEPB Inhale 1 puff into the lungs daily 3 each 4    albuterol (PROVENTIL) (2.5 MG/3ML) 0.083% nebulizer solution Take 3 mLs by nebulization in the morning and at bedtime 120 each 3    albuterol sulfate HFA (VENTOLIN HFA) 108 (90 Base) MCG/ACT inhaler Inhale 2 puffs into the lungs 4 times daily as needed for Wheezing 54 g 1    albuterol (PROVENTIL) (2.5 MG/3ML) 0.083% nebulizer solution Take 3 mLs by nebulization every 6 hours as needed for Wheezing 120 each 3    albuterol sulfate HFA (PROVENTIL;VENTOLIN;PROAIR) 108 (90 Base) MCG/ACT inhaler INHALE 2 PUFFS INTO THE LUNGS EVERY 6-8 HOURS AS NEEDED FOR WHEEZING 6.7 each 0    acetaminophen (TYLENOL) 500 MG tablet Take 500 mg by mouth daily as needed for Pain      Multiple Vitamins-Minerals (ALIVE MULTI-VITAMIN PO) Take 1 tablet by mouth daily      Multiple Vitamins-Minerals (HAIR SKIN AND NAILS FORMULA PO) Take 1 tablet by mouth daily      vilazodone HCl (VIIBRYD) 40 MG TABS Take 40 mg by mouth daily         I have reviewed all pertinent PMHx, PSHx, FamHx, SocialHx, medications, and allergies and updated history as appropriate. OBJECTIVE    VS: /70   Pulse 72   Temp 98.5 °F (36.9 °C) (Temporal)   Resp 17   Ht 5' 2\" (1.575 m)   Wt 103 lb (46.7 kg)   LMP  (LMP Unknown)   SpO2 95%   Breastfeeding No   BMI 18.84 kg/m²   Physical Exam  Constitutional:       General: She is not in acute distress. Appearance: She is well-developed. She is not diaphoretic. HENT:      Head: Normocephalic and atraumatic. Mouth/Throat:      Mouth: Mucous membranes are moist.      Pharynx: Posterior oropharyngeal erythema present. No oropharyngeal exudate. Eyes:      Conjunctiva/sclera: Conjunctivae normal.      Pupils: Pupils are equal, round, and reactive to light. Cardiovascular:      Rate and Rhythm: Normal rate and regular rhythm. Pulmonary:      Effort: Pulmonary effort is normal.      Breath sounds: Normal breath sounds. Abdominal:      General: Bowel sounds are normal. There is no distension. Palpations: Abdomen is soft. Tenderness: There is no abdominal tenderness. Hernia: No hernia is present. Musculoskeletal:      Cervical back: Normal range of motion and neck supple. Skin:     General: Skin is warm and dry. Neurological:      Mental Status: She is alert and oriented to person, place, and time. ASSESSMENT/PLAN:  1. Sore throat  Augmentin rx sent. - POCT rapid strep A  - POCT COVID-19, Antigen    I have reviewed my findings and recommendations with Ariadna Blount MD  2/4/2023 10:10 PM  Return for established f/u. Counseled regarding above diagnosis, including possible risks and complications, especially if left uncontrolled.     Patient counseled on red flag symptoms and if they occur to go to the ED.     Discussed medications risk/benefits and possible side effects and alternatives to treatment. Patient and/or guardian verbalizes understanding, agrees, feels comfortable with and wishes to proceed with above treatment plan.      Advised patient regarding importance of keeping up with recommended health maintenance and to schedule as soon as possible if overdue, as this is important in assessing for undiagnosed pathology, especially cancer, as well as protecting against potentially harmful/life threatening disease.       Patient and/or guardian verbalizes understanding and agrees with above counseling, assessment and plan. All questions answered.    Please note this report has been partially produced using speech recognition software  and may contain errors related to that system including grammar, punctuation and spelling as well as words and phrases that may seem inappropriate. If there are questions or concerns please feel free to contact me to clarify.

## 2023-02-03 ENCOUNTER — TELEPHONE (OUTPATIENT)
Dept: FAMILY MEDICINE CLINIC | Age: 68
End: 2023-02-03

## 2023-02-03 RX ORDER — AZITHROMYCIN 250 MG/1
250 TABLET, FILM COATED ORAL SEE ADMIN INSTRUCTIONS
Qty: 6 TABLET | Refills: 0 | Status: SHIPPED | OUTPATIENT
Start: 2023-02-03 | End: 2023-02-08

## 2023-02-03 NOTE — TELEPHONE ENCOUNTER
Patient called in stating she was in the office yesterday and tested positive for strep. Patient states she is unable to take the amoxicillin she was given because it makes her vomit. Patient is requesting a new medication to be called in .please advise?

## 2023-02-04 ASSESSMENT — ENCOUNTER SYMPTOMS
NAUSEA: 0
VOMITING: 0
CONSTIPATION: 0
COUGH: 0
SORE THROAT: 1
ABDOMINAL PAIN: 0
WHEEZING: 0
SHORTNESS OF BREATH: 0
DIARRHEA: 0
RHINORRHEA: 0
SINUS PRESSURE: 0

## 2023-04-26 ENCOUNTER — OFFICE VISIT (OUTPATIENT)
Dept: PULMONOLOGY | Age: 68
End: 2023-04-26
Payer: MEDICARE

## 2023-04-26 DIAGNOSIS — J44.1 CHRONIC OBSTRUCTIVE PULMONARY DISEASE WITH ACUTE EXACERBATION (HCC): Primary | ICD-10-CM

## 2023-04-26 PROCEDURE — 99213 OFFICE O/P EST LOW 20 MIN: CPT | Performed by: INTERNAL MEDICINE

## 2023-04-26 PROCEDURE — 1123F ACP DISCUSS/DSCN MKR DOCD: CPT | Performed by: INTERNAL MEDICINE

## 2023-04-26 NOTE — PATIENT INSTRUCTIONS
[facility]  [address] [facility], [address]  Office: [phone]      Your were seen in the office today for  Bronchiectasis      We  did not make changes to your medications today. Testing ordered today was none      Vaccines recommended none              Please do not hesitate to call the office with any questions.

## 2023-05-17 DIAGNOSIS — J44.9 CHRONIC OBSTRUCTIVE PULMONARY DISEASE, UNSPECIFIED COPD TYPE (HCC): Primary | ICD-10-CM

## 2023-05-17 DIAGNOSIS — J47.9 ADULT BRONCHIECTASIS (HCC): ICD-10-CM

## 2023-05-17 RX ORDER — ALBUTEROL SULFATE 90 UG/1
2 AEROSOL, METERED RESPIRATORY (INHALATION) 4 TIMES DAILY PRN
Qty: 54 G | Refills: 3 | Status: SHIPPED | OUTPATIENT
Start: 2023-05-17

## 2023-07-18 ENCOUNTER — TELEPHONE (OUTPATIENT)
Dept: PULMONOLOGY | Age: 68
End: 2023-07-18

## 2023-07-18 NOTE — TELEPHONE ENCOUNTER
Patient  called in needing a refill on her Incruse sent to [facility] on [employer].

## 2023-07-19 DIAGNOSIS — J44.9 CHRONIC OBSTRUCTIVE PULMONARY DISEASE, UNSPECIFIED COPD TYPE (HCC): ICD-10-CM

## 2023-07-19 DIAGNOSIS — J47.9 ADULT BRONCHIECTASIS (HCC): ICD-10-CM

## 2023-08-28 ENCOUNTER — OFFICE VISIT (OUTPATIENT)
Dept: PULMONOLOGY | Age: 68
End: 2023-08-28
Payer: MEDICARE

## 2023-08-28 DIAGNOSIS — J47.9 ADULT BRONCHIECTASIS (HCC): Primary | ICD-10-CM

## 2023-08-28 DIAGNOSIS — J44.9 CHRONIC OBSTRUCTIVE PULMONARY DISEASE, UNSPECIFIED COPD TYPE (HCC): ICD-10-CM

## 2023-08-28 PROCEDURE — 99213 OFFICE O/P EST LOW 20 MIN: CPT | Performed by: INTERNAL MEDICINE

## 2023-08-28 PROCEDURE — 1123F ACP DISCUSS/DSCN MKR DOCD: CPT | Performed by: INTERNAL MEDICINE

## 2023-08-28 NOTE — PATIENT INSTRUCTIONS
[address] [place] [address]  Leonie Vilchis, [address] [facility]  Office: [phone]      Your were seen in the office today for  Bronchiectasis      We  did not make changes to your medications today. Testing ordered today was pulmonary function tests      Vaccines recommended influenza      Please do not hesitate to call the office with any questions.

## 2023-08-28 NOTE — PROGRESS NOTES
Patient to follow up with physician in 6 months. Patient will have full PFTs scheduled prior to next office visit to be done at [facility].
History:   Procedure Laterality Date    BRONCHOSCOPY N/A 12/30/2019    BRONCHOSCOPY ALVEOLAR LAVAGE performed by Mireya Quinteros MD at [address] [place] [address] N/A 6/14/2022    BRONCHOSCOPY DIAGNOSTIC OR CELL [address] ONLY-BAL performed by Cammie Lyon DO at [facility] ENDOSCOPY       FAMILY HISTORY: No family history of cancer, blood clots     REVIEW OF SYSTEMS:  Constitutional: Currently denies fevers, chills, night sweats. Skin: No rashes or lesions  EENT: No change in vision, change in hearing, change in taste, change in smell  Cardiovascular: Denies chest pain, chest pressure, palpitations  Respiration: Positive for intermittent shortness of breath. Productive cough. Denies wheezing. Denies hemoptysis  Gastrointestinal: Denies nausea, vomiting, diarrhea. Musculoskeletal: Denies joint or muscle pain  Neurological: Denies syncope, headache, seizures  Psychological: Denies anxiety or depression  Endocrine: Denies polyuria polydipsia  Hematopoietic/lymphatic: Denies easy bruising        PHYSICAL EXAMINATION:  Constitutional: Patient is thin and frail appearing    EENT: PERRL, EOMI, no oropharyngeal erythema. No palpable adenopathy  Neck: Trachea and thyroid midline  Respiratory: Breath sounds clear to auscultation on today's examination. No use of accessory muscles. Cardiovascular: Regular rate and rhythm, no murmurs rubs or gallops  Pulses: Equal bilaterally  Abdomen: Soft nontender bowel sounds present  Lymphatic: No palpable adenopathy  Musculoskeletal: Gait steady  Extremities: Clubbing present. No cyanosis or edema  Skin: No rashes or lesions  Neurological/Psychiatric: Neurologically intact, no focal deficits. Affect appropriate    DATA: Spirometry was not completed in clinic today. However, pulmonary function testing on June 2, 2022 showed FVC of 1.52 L which is 54% of predicted. FEV1 of 1.12 L which is 50% of predicted. FEV1 FVC ratio of 73. There is 11% increase in FVC.   MVV is 56 which is

## 2023-12-01 ENCOUNTER — HOSPITAL ENCOUNTER (OUTPATIENT)
Dept: GENERAL RADIOLOGY | Age: 68
End: 2023-12-01
Payer: MEDICARE

## 2023-12-01 ENCOUNTER — OFFICE VISIT (OUTPATIENT)
Dept: FAMILY MEDICINE CLINIC | Age: 68
End: 2023-12-01

## 2023-12-01 ENCOUNTER — HOSPITAL ENCOUNTER (OUTPATIENT)
Age: 68
End: 2023-12-01
Payer: MEDICARE

## 2023-12-01 VITALS
SYSTOLIC BLOOD PRESSURE: 106 MMHG | DIASTOLIC BLOOD PRESSURE: 68 MMHG | TEMPERATURE: 98.6 F | RESPIRATION RATE: 20 BRPM | BODY MASS INDEX: 19.03 KG/M2 | OXYGEN SATURATION: 96 % | WEIGHT: 103.4 LBS | HEART RATE: 95 BPM | HEIGHT: 62 IN

## 2023-12-01 DIAGNOSIS — R05.1 ACUTE COUGH: ICD-10-CM

## 2023-12-01 DIAGNOSIS — J40 BRONCHITIS: ICD-10-CM

## 2023-12-01 DIAGNOSIS — D83.9 CVID (COMMON VARIABLE IMMUNODEFICIENCY) (HCC): ICD-10-CM

## 2023-12-01 DIAGNOSIS — R73.03 PREDIABETES: ICD-10-CM

## 2023-12-01 DIAGNOSIS — J47.9 ADULT BRONCHIECTASIS (HCC): ICD-10-CM

## 2023-12-01 DIAGNOSIS — J44.9 CHRONIC OBSTRUCTIVE PULMONARY DISEASE, UNSPECIFIED COPD TYPE (HCC): ICD-10-CM

## 2023-12-01 DIAGNOSIS — R53.83 OTHER FATIGUE: ICD-10-CM

## 2023-12-01 DIAGNOSIS — E78.5 DYSLIPIDEMIA: ICD-10-CM

## 2023-12-01 DIAGNOSIS — F32.0 MAJOR DEPRESSIVE DISORDER, SINGLE EPISODE, MILD (HCC): ICD-10-CM

## 2023-12-01 DIAGNOSIS — A31.0 PULMONARY MYCOBACTERIUM AVIUM COMPLEX (MAC) INFECTION (HCC): ICD-10-CM

## 2023-12-01 DIAGNOSIS — A31.0 MYCOBACTERIUM AVIUM COMPLEX (HCC): Primary | ICD-10-CM

## 2023-12-01 PROBLEM — A41.02 SEPTIC SHOCK DUE TO METHICILLIN RESISTANT STAPHYLOCOCCUS AUREUS (HCC): Status: RESOLVED | Noted: 2021-01-05 | Resolved: 2023-12-01

## 2023-12-01 PROBLEM — A41.9 SEPSIS (HCC): Status: RESOLVED | Noted: 2021-01-05 | Resolved: 2023-12-01

## 2023-12-01 PROBLEM — A41.9 SEPTIC SHOCK (HCC): Status: RESOLVED | Noted: 2021-01-05 | Resolved: 2023-12-01

## 2023-12-01 PROBLEM — R11.2 INTRACTABLE NAUSEA AND VOMITING: Status: RESOLVED | Noted: 2020-05-20 | Resolved: 2023-12-01

## 2023-12-01 PROBLEM — E46 PROTEIN CALORIE MALNUTRITION (HCC): Status: RESOLVED | Noted: 2022-12-22 | Resolved: 2023-12-01

## 2023-12-01 PROBLEM — R65.21 SEPTIC SHOCK DUE TO METHICILLIN RESISTANT STAPHYLOCOCCUS AUREUS (HCC): Status: RESOLVED | Noted: 2021-01-05 | Resolved: 2023-12-01

## 2023-12-01 PROBLEM — R65.21 SEPTIC SHOCK (HCC): Status: RESOLVED | Noted: 2021-01-05 | Resolved: 2023-12-01

## 2023-12-01 PROBLEM — J84.89 ORGANIZING PNEUMONIA (HCC): Status: RESOLVED | Noted: 2019-12-28 | Resolved: 2023-12-01

## 2023-12-01 PROBLEM — I76 SEPTIC EMBOLISM (HCC): Status: RESOLVED | Noted: 2021-05-21 | Resolved: 2023-12-01

## 2023-12-01 LAB
INFLUENZA A ANTIGEN, POC: NEGATIVE
INFLUENZA B ANTIGEN, POC: NEGATIVE
LOT EXPIRE DATE: NORMAL
LOT KIT NUMBER: NORMAL
SARS-COV-2, POC: NORMAL
VALID INTERNAL CONTROL: YES
VENDOR AND KIT NAME POC: NORMAL

## 2023-12-01 PROCEDURE — 71046 X-RAY EXAM CHEST 2 VIEWS: CPT

## 2023-12-01 RX ORDER — MINOCYCLINE HYDROCHLORIDE 100 MG/1
100 CAPSULE ORAL 2 TIMES DAILY
Qty: 20 CAPSULE | Refills: 0 | Status: SHIPPED | OUTPATIENT
Start: 2023-12-01

## 2023-12-01 RX ORDER — AZELASTINE 1 MG/ML
1 SPRAY, METERED NASAL 2 TIMES DAILY
Qty: 60 ML | Refills: 1 | Status: SHIPPED | OUTPATIENT
Start: 2023-12-01

## 2023-12-01 RX ORDER — BENZONATATE 200 MG/1
200 CAPSULE ORAL 3 TIMES DAILY PRN
Qty: 30 CAPSULE | Refills: 0 | Status: SHIPPED | OUTPATIENT
Start: 2023-12-01 | End: 2023-12-08

## 2023-12-01 RX ORDER — DOXYCYCLINE HYCLATE 100 MG
100 TABLET ORAL 2 TIMES DAILY
Qty: 20 TABLET | Refills: 0 | Status: SHIPPED
Start: 2023-12-01 | End: 2023-12-01

## 2023-12-01 SDOH — ECONOMIC STABILITY: FOOD INSECURITY: WITHIN THE PAST 12 MONTHS, YOU WORRIED THAT YOUR FOOD WOULD RUN OUT BEFORE YOU GOT MONEY TO BUY MORE.: NEVER TRUE

## 2023-12-01 SDOH — ECONOMIC STABILITY: INCOME INSECURITY: HOW HARD IS IT FOR YOU TO PAY FOR THE VERY BASICS LIKE FOOD, HOUSING, MEDICAL CARE, AND HEATING?: NOT HARD AT ALL

## 2023-12-01 SDOH — ECONOMIC STABILITY: HOUSING INSECURITY
IN THE LAST 12 MONTHS, WAS THERE A TIME WHEN YOU DID NOT HAVE A STEADY PLACE TO SLEEP OR SLEPT IN A SHELTER (INCLUDING NOW)?: NO

## 2023-12-01 SDOH — ECONOMIC STABILITY: FOOD INSECURITY: WITHIN THE PAST 12 MONTHS, THE FOOD YOU BOUGHT JUST DIDN'T LAST AND YOU DIDN'T HAVE MONEY TO GET MORE.: NEVER TRUE

## 2023-12-01 ASSESSMENT — ENCOUNTER SYMPTOMS
SORE THROAT: 0
DIARRHEA: 0
ABDOMINAL PAIN: 0
RECTAL PAIN: 0
ORTHOPNEA: 0
WHEEZING: 0
FACIAL SWELLING: 0
RHINORRHEA: 0
CONSTIPATION: 0
EYE PAIN: 0
STRIDOR: 0
BLURRED VISION: 0
ANAL BLEEDING: 0
BLOOD IN STOOL: 0
BACK PAIN: 0
COUGH: 1
EYE DISCHARGE: 0
TROUBLE SWALLOWING: 0
VOMITING: 0
VOICE CHANGE: 0
EYE ITCHING: 0
EYE REDNESS: 0
CHOKING: 0
CHEST TIGHTNESS: 0
SHORTNESS OF BREATH: 1
SINUS PRESSURE: 1
NAUSEA: 1
APNEA: 0
ALLERGIC/IMMUNOLOGIC NEGATIVE: 1
PHOTOPHOBIA: 0
COLOR CHANGE: 0
SINUS PAIN: 0
ABDOMINAL DISTENTION: 0

## 2023-12-04 ENCOUNTER — TELEPHONE (OUTPATIENT)
Dept: FAMILY MEDICINE CLINIC | Age: 68
End: 2023-12-04

## 2023-12-04 NOTE — TELEPHONE ENCOUNTER
Pt called, was seen 12/01/23 pt is feeling worse and is feeling extremely dizzy, weak and fatigued, cough and SOB w/o ,w exertion.   I instructed Patient based off of these symptoms to go to the ED.

## 2023-12-04 NOTE — TELEPHONE ENCOUNTER
LM again advising that she should go to ER, she was already advised by Camila on the phone call earlier

## 2023-12-06 ENCOUNTER — OFFICE VISIT (OUTPATIENT)
Dept: FAMILY MEDICINE CLINIC | Age: 68
End: 2023-12-06
Payer: MEDICARE

## 2023-12-06 VITALS
BODY MASS INDEX: 18.51 KG/M2 | HEIGHT: 62 IN | WEIGHT: 100.6 LBS | TEMPERATURE: 97.1 F | RESPIRATION RATE: 18 BRPM | SYSTOLIC BLOOD PRESSURE: 102 MMHG | DIASTOLIC BLOOD PRESSURE: 64 MMHG | OXYGEN SATURATION: 98 % | HEART RATE: 78 BPM

## 2023-12-06 DIAGNOSIS — A31.0 MYCOBACTERIUM AVIUM COMPLEX (HCC): ICD-10-CM

## 2023-12-06 DIAGNOSIS — F32.0 MAJOR DEPRESSIVE DISORDER, SINGLE EPISODE, MILD (HCC): ICD-10-CM

## 2023-12-06 DIAGNOSIS — A31.0 PULMONARY MYCOBACTERIUM AVIUM COMPLEX (MAC) INFECTION (HCC): ICD-10-CM

## 2023-12-06 DIAGNOSIS — J44.9 CHRONIC OBSTRUCTIVE PULMONARY DISEASE, UNSPECIFIED COPD TYPE (HCC): Primary | ICD-10-CM

## 2023-12-06 DIAGNOSIS — J47.9 ADULT BRONCHIECTASIS (HCC): ICD-10-CM

## 2023-12-06 PROBLEM — N17.9 ACUTE RENAL FAILURE (ARF) (HCC): Status: RESOLVED | Noted: 2020-05-20 | Resolved: 2023-12-06

## 2023-12-06 PROCEDURE — 1123F ACP DISCUSS/DSCN MKR DOCD: CPT | Performed by: FAMILY MEDICINE

## 2023-12-06 PROCEDURE — 99214 OFFICE O/P EST MOD 30 MIN: CPT | Performed by: FAMILY MEDICINE

## 2023-12-06 RX ORDER — UMECLIDINIUM BROMIDE AND VILANTEROL TRIFENATATE 62.5; 25 UG/1; UG/1
1 POWDER RESPIRATORY (INHALATION) DAILY
Qty: 1 EACH | Refills: 4 | Status: SHIPPED | OUTPATIENT
Start: 2023-12-06

## 2023-12-06 RX ORDER — AZITHROMYCIN 250 MG/1
250 TABLET, FILM COATED ORAL DAILY
Qty: 20 TABLET | Refills: 1 | Status: SHIPPED | OUTPATIENT
Start: 2023-12-06

## 2023-12-06 ASSESSMENT — ENCOUNTER SYMPTOMS
CHEST TIGHTNESS: 0
EYE PAIN: 0
BLURRED VISION: 0
ALLERGIC/IMMUNOLOGIC NEGATIVE: 1
COLOR CHANGE: 0
BLOOD IN STOOL: 0
COUGH: 1
APNEA: 0
BACK PAIN: 0
WHEEZING: 0
RHINORRHEA: 0
PHOTOPHOBIA: 0
EYE DISCHARGE: 0
ANAL BLEEDING: 0
FACIAL SWELLING: 0
CONSTIPATION: 0
SINUS PAIN: 0
VOICE CHANGE: 0
RECTAL PAIN: 0
STRIDOR: 0
ABDOMINAL PAIN: 0
ORTHOPNEA: 0
SHORTNESS OF BREATH: 1
EYE ITCHING: 0
VOMITING: 0
NAUSEA: 1
SORE THROAT: 0
EYE REDNESS: 0
DIARRHEA: 0
TROUBLE SWALLOWING: 0
SINUS PRESSURE: 0
CHOKING: 0
ABDOMINAL DISTENTION: 0

## 2023-12-06 NOTE — PROGRESS NOTES
SUBJECTIVE  Lisa Schreiber is a 79 y.o. female. HPI/Chief C/O:  Chief Complaint   Patient presents with    Follow-up     Pt still not feeling well. Thinks she's having a reaction to the antibiotic. Pt says she's very dizzy and SOB (is new). Pt is still taking the Minocin. Wakes in the middle of the night and the room is spinning      Allergies   Allergen Reactions    Rifampin Other (See Comments)     States this medicine caused Renal Failure   The patient is here for a medication list and treatment planning review  We will go over our care planning goals as well as take care of all refills  We will set up labs as well     Her cough is no better        Hypertension  Associated symptoms include anxiety, malaise/fatigue and shortness of breath. Pertinent negatives include no blurred vision, chest pain, headaches, neck pain, orthopnea, palpitations, peripheral edema, PND or sweats. Risk factors for coronary artery disease include stress. Past treatments include lifestyle changes. The current treatment provides significant improvement. Compliance problems include diet, exercise and psychosocial issues. There is no history of angina, kidney disease, CAD/MI, CVA, heart failure, left ventricular hypertrophy, PVD or retinopathy. There is no history of chronic renal disease, coarctation of the aorta, hyperaldosteronism, hypercortisolism, hyperparathyroidism, a hypertension causing med, pheochromocytoma, renovascular disease, sleep apnea or a thyroid problem. ROS:  Review of Systems   Constitutional:  Positive for fatigue and malaise/fatigue. Negative for activity change, appetite change, chills, diaphoresis, fever and unexpected weight change. HENT:  Negative for congestion, dental problem, drooling, ear discharge, ear pain, facial swelling, hearing loss, mouth sores, nosebleeds, postnasal drip, rhinorrhea, sinus pressure, sinus pain, sneezing, sore throat, tinnitus, trouble swallowing and voice change.     Eyes:

## 2023-12-19 ENCOUNTER — TELEPHONE (OUTPATIENT)
Dept: FAMILY MEDICINE CLINIC | Age: 68
End: 2023-12-19

## 2023-12-19 NOTE — TELEPHONE ENCOUNTER
Pt states she does not want the anoro inhaler states it is making her cough more and she is requesting incruse. Please advise.     Electronically signed by DEL GUTIERREZ MA on 12/19/23 at 3:53 PM EST

## 2023-12-20 RX ORDER — UMECLIDINIUM 62.5 UG/1
1 AEROSOL, POWDER ORAL DAILY
Qty: 30 EACH | Refills: 1 | Status: SHIPPED | OUTPATIENT
Start: 2023-12-20

## 2023-12-20 NOTE — TELEPHONE ENCOUNTER
Pt states she will call and get a follow up with pulmonary and states she would still like the incruse sent in since Dr. Rey changed her medication.     Electronically signed by DEL GUTIERREZ MA on 12/20/23 at 11:51 AM EST

## 2023-12-20 NOTE — TELEPHONE ENCOUNTER
Incruse sent. My recommendation still remains below that if, patient symptoms without improvement/worsening after being seen in the office twice recently, 12/1 and 12/6 by Dr. Rey she needs to be reevaluated in UC/ED at this point.

## 2023-12-27 ENCOUNTER — TELEPHONE (OUTPATIENT)
Dept: PULMONOLOGY | Age: 68
End: 2023-12-27

## 2023-12-27 NOTE — TELEPHONE ENCOUNTER
Mailed letter to patient to inform her of the PFT scheduled for her on Monday, February 19, 2024 at 12:00 pm with an arrival time of 11:30 am. The test is scheduled at the [facility]. The prep for this test is to not take any respiratory medications for at least 4 hours prior to testing time and no caffeine for at least 24 hours prior to testing time.

## 2024-03-01 ENCOUNTER — HOSPITAL ENCOUNTER (EMERGENCY)
Age: 69
Discharge: HOME | End: 2024-03-01
Attending: STUDENT IN AN ORGANIZED HEALTH CARE EDUCATION/TRAINING PROGRAM
Payer: COMMERCIAL

## 2024-03-01 ENCOUNTER — APPOINTMENT (OUTPATIENT)
Dept: GENERAL RADIOLOGY | Age: 69
End: 2024-03-01
Payer: COMMERCIAL

## 2024-03-01 VITALS
WEIGHT: 110 LBS | BODY MASS INDEX: 18.33 KG/M2 | OXYGEN SATURATION: 94 % | DIASTOLIC BLOOD PRESSURE: 88 MMHG | RESPIRATION RATE: 18 BRPM | HEART RATE: 77 BPM | HEIGHT: 65 IN | TEMPERATURE: 98 F | SYSTOLIC BLOOD PRESSURE: 128 MMHG

## 2024-03-01 DIAGNOSIS — M46.1 DEGENERATIVE JOINT DISEASE OF SACROILIAC JOINT (HCC): ICD-10-CM
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
## 2024-03-01 DIAGNOSIS — S39.012A STRAIN OF LUMBAR REGION, INITIAL ENCOUNTER: ICD-10-CM

## 2024-03-01 PROCEDURE — 6370000000 HC RX 637 (ALT 250 FOR IP)

## 2024-03-01 PROCEDURE — 96372 THER/PROPH/DIAG INJ SC/IM: CPT

## 2024-03-01 PROCEDURE — 72170 X-RAY EXAM OF PELVIS: CPT

## 2024-03-01 PROCEDURE — 72220 X-RAY EXAM SACRUM TAILBONE: CPT

## 2024-03-01 PROCEDURE — 6360000002 HC RX W HCPCS

## 2024-03-01 PROCEDURE — 71045 X-RAY EXAM CHEST 1 VIEW: CPT

## 2024-03-01 PROCEDURE — 99284 EMERGENCY DEPT VISIT MOD MDM: CPT

## 2024-03-01 RX ORDER — ORPHENADRINE CITRATE 30 MG/ML
60 INJECTION INTRAMUSCULAR; INTRAVENOUS ONCE
Status: COMPLETED | OUTPATIENT
Start: 2024-03-01 | End: 2024-03-01

## 2024-03-01 RX ORDER — ACETAMINOPHEN 325 MG/1
650 TABLET ORAL ONCE
Status: COMPLETED | OUTPATIENT
Start: 2024-03-01 | End: 2024-03-01

## 2024-03-01 RX ORDER — ACETAMINOPHEN 500 MG
500 TABLET ORAL 4 TIMES DAILY PRN
Qty: 120 TABLET | Refills: 0 | Status: SHIPPED | OUTPATIENT
Start: 2024-03-01 | End: 2024-03-01

## 2024-03-01 RX ORDER — LIDOCAINE 4 G/G
1 PATCH TOPICAL DAILY
Qty: 30 PATCH | Refills: 0 | Status: SHIPPED | OUTPATIENT
Start: 2024-03-01 | End: 2024-03-31

## 2024-03-01 RX ORDER — LIDOCAINE 4 G/G
1 PATCH TOPICAL DAILY
Qty: 30 PATCH | Refills: 0 | Status: SHIPPED | OUTPATIENT
Start: 2024-03-01 | End: 2024-03-01

## 2024-03-01 RX ORDER — ONDANSETRON 4 MG/1
4 TABLET, ORALLY DISINTEGRATING ORAL ONCE
Status: COMPLETED | OUTPATIENT
Start: 2024-03-01 | End: 2024-03-01

## 2024-03-01 RX ORDER — ORPHENADRINE CITRATE 100 MG/1
100 TABLET, EXTENDED RELEASE ORAL 2 TIMES DAILY
Qty: 20 TABLET | Refills: 0 | Status: SHIPPED | OUTPATIENT
Start: 2024-03-01 | End: 2024-03-11

## 2024-03-01 RX ORDER — ACETAMINOPHEN 500 MG
500 TABLET ORAL 4 TIMES DAILY PRN
Qty: 120 TABLET | Refills: 0 | Status: SHIPPED | OUTPATIENT
Start: 2024-03-01

## 2024-03-01 RX ADMIN — ONDANSETRON 4 MG: 4 TABLET, ORALLY DISINTEGRATING ORAL at 11:41

## 2024-03-01 RX ADMIN — ACETAMINOPHEN 650 MG: 325 TABLET ORAL at 11:41

## 2024-03-01 RX ADMIN — ORPHENADRINE CITRATE 60 MG: 60 INJECTION INTRAMUSCULAR; INTRAVENOUS at 14:06

## 2024-03-01 ASSESSMENT — PAIN - FUNCTIONAL ASSESSMENT: PAIN_FUNCTIONAL_ASSESSMENT: NONE - DENIES PAIN

## 2024-03-01 NOTE — DISCHARGE INSTRUCTIONS
XR CHEST PORTABLE   Final Result   1. There are no findings of failure or gross pneumonia   2. Advanced emphysematous changes with advanced fibrotic changes seen   throughout the right and left lungs more prominent within the right lung.         XR SACRUM COCCYX (MIN 2 VIEWS)   Final Result   Degenerative changes seen of the sacroiliac joints bilaterally with no acute   bony abnormality of the sacrum or coccyx.         XR PELVIS (1-2 VIEWS)   Final Result   No acute abnormality of the pelvis.

## 2024-03-01 NOTE — ED PROVIDER NOTES
[facility] EMERGENCY DEPARTMENT  EMERGENCY DEPARTMENT ENCOUNTER        Pt Name: Lisa Pierce  MRN: [id number]  Birthdate 1955  Date of evaluation: 3/1/2024  Provider: Virgil Zeng MD  PCP: Noelle Maynard MD  Note Started: 12:32 PM EST 3/1/24    CHIEF COMPLAINT       Chief Complaint   Patient presents with    Fall     Tripped and fell at work [employer]. Landed on butt. Complaining of buttock (coccyx) pain. Did not ht head, did not lose consciousness. No other complaints.        HISTORY OF PRESENT ILLNESS: 1 or more Elements   History From: Patient    Limitations to history : None  Social Determinants : None    Lisa Pierce is a 68 y.o. female with a history of COPD, GERD, MAC infection who presents after a fall.  She mentions that she tripped, fell back and landed on her buttocks.  Does not mention hitting the head, no loss of consciousness.  She complains of pain on the buttocks near the coccyx.    She is not on any blood thinners.    Denies any fever, chills, nausea, vomiting, headache, dizziness, vision changes, neck tenderness or stiffness, weakness, chest pain, palpitations, leg swelling/tenderness, sob, cough, abdominal pain, dysuria, hematuria, diarrhea, constipation, bloody stools.    Nursing Notes were all reviewed and agreed with or any disagreements were addressed in the HPI.    ROS:   Pertinent positives and negatives are stated within HPI, all other systems reviewed and are negative.      --------------------------------------------- PAST HISTORY ---------------------------------------------  Past Medical History:       Diagnosis Date    Bronchiectasis (HCC)     COPD (chronic obstructive pulmonary disease) (HCC)     Depression     Hx of blood clots     lung    Mycobacterium avium complex (HCC)        Past Surgical History:       Procedure Laterality Date    BRONCHOSCOPY N/A 12/30/2019    BRONCHOSCOPY ALVEOLAR LAVAGE performed by Jason Nunn MD at [facility]  home  Patient condition is stable    NOTE: This report was transcribed using voice recognition software. Every effort was made to ensure accuracy; however, inadvertent computerized transcription errors may be present

## 2024-05-02 ENCOUNTER — TELEPHONE (OUTPATIENT)
Dept: FAMILY MEDICINE CLINIC | Age: 69
End: 2024-05-02

## 2024-05-02 NOTE — TELEPHONE ENCOUNTER
Patient called and states since Sunday she has been having D,V,N worse when she eats anything. Patient states she is aslo having weakness and fatigue as well. Patient requesting a medication to be sent in for the N,V,D.  Patient was informed of walk in facilities and is agreeable to go if no medication can be called in to local pharmacy.

## 2024-05-03 NOTE — TELEPHONE ENCOUNTER
Spoke to pt and she is amendable to go to Urgent care.    Electronically signed by Kaitlin Fitzpatrick LPN on 5/3/24 at 9:31 AM EDT

## 2024-07-19 ENCOUNTER — OFFICE VISIT (OUTPATIENT)
Dept: PRIMARY CARE CLINIC | Age: 69
End: 2024-07-19
Payer: MEDICARE

## 2024-07-19 VITALS
HEIGHT: 65 IN | BODY MASS INDEX: 17 KG/M2 | OXYGEN SATURATION: 96 % | HEART RATE: 69 BPM | RESPIRATION RATE: 18 BRPM | DIASTOLIC BLOOD PRESSURE: 69 MMHG | WEIGHT: 102 LBS | TEMPERATURE: 98.1 F | SYSTOLIC BLOOD PRESSURE: 119 MMHG

## 2024-07-19 DIAGNOSIS — L03.012 CELLULITIS OF LEFT INDEX FINGER: Primary | ICD-10-CM

## 2024-07-19 PROCEDURE — 1123F ACP DISCUSS/DSCN MKR DOCD: CPT | Performed by: NURSE PRACTITIONER

## 2024-07-19 PROCEDURE — 96372 THER/PROPH/DIAG INJ SC/IM: CPT | Performed by: NURSE PRACTITIONER

## 2024-07-19 PROCEDURE — 99213 OFFICE O/P EST LOW 20 MIN: CPT | Performed by: NURSE PRACTITIONER

## 2024-07-19 RX ORDER — CEFTRIAXONE SODIUM 250 MG/1
250 INJECTION, POWDER, FOR SOLUTION INTRAMUSCULAR; INTRAVENOUS ONCE
Status: COMPLETED | OUTPATIENT
Start: 2024-07-19 | End: 2024-07-19

## 2024-07-19 RX ORDER — CEPHALEXIN 500 MG/1
500 CAPSULE ORAL 3 TIMES DAILY
Qty: 21 CAPSULE | Refills: 0 | Status: SHIPPED | OUTPATIENT
Start: 2024-07-19 | End: 2024-07-26

## 2024-07-19 RX ORDER — PREDNISONE 10 MG/1
10 TABLET ORAL 2 TIMES DAILY
Qty: 10 TABLET | Refills: 0 | Status: SHIPPED | OUTPATIENT
Start: 2024-07-19 | End: 2024-07-24

## 2024-07-19 RX ADMIN — CEFTRIAXONE SODIUM 250 MG: 250 INJECTION, POWDER, FOR SOLUTION INTRAMUSCULAR; INTRAVENOUS at 16:19

## 2024-10-19 NOTE — PROGRESS NOTES
Chief Complaint:   Insect Bite (Thinks she got insect bite on left index finger 3 days ago. Red/swollen and unable to bend)      History of Present Illness   Source of history provided by:  patient.      Lisa Pierce is a 69 y.o. old female who has a past medical history of:   Past Medical History:   Diagnosis Date    Bronchiectasis (HCC)     COPD (chronic obstructive pulmonary disease) (HCC)     Depression     Hx of blood clots     lung    Mycobacterium avium complex (HCC)        Pt  presents to the [facility] In Care for complaint of redness/edema/pain to the left index finger, which began 3 days ago.  The symptoms were triggered by possible insect bite.    Pt has not had similar symptoms in the past.  Pt states the area is warm to touch, mildly painful and swollen, and has no noted streaking.  Denies any fever, chills, HA, recent illness, myalgias, vomiting, or lethargy.       ROS    Unless otherwise stated in this report or unable to obtain because of the patient's clinical or mental status as evidenced by the medical record, this patients's positive and negative responses for Review of Systems, constitutional, psych, eyes, ENT, cardiovascular, respiratory, gastrointestinal, neurological, genitourinary, musculoskeletal, integument systems and systems related to the presenting problem are either stated in the preceding or were not pertinent or were negative for the symptoms and/or complaints related to the medical problem.    Past Surgical History:  has a past surgical history that includes bronchoscopy (N/A, 12/30/2019) and bronchoscopy (N/A, 6/14/2022).  Social History:  reports that she quit smoking about 26 years ago. Her smoking use included cigarettes. She started smoking about 47 years ago. She has a 21.0 pack-year smoking history. She has never used smokeless tobacco. She reports that she does not drink alcohol and does not use drugs.  Family History: family history includes No Known Problems in her father 
Patent

## 2024-11-26 DIAGNOSIS — J44.9 CHRONIC OBSTRUCTIVE PULMONARY DISEASE, UNSPECIFIED COPD TYPE (HCC): ICD-10-CM

## 2024-11-26 DIAGNOSIS — J47.9 ADULT BRONCHIECTASIS (HCC): ICD-10-CM

## 2024-11-26 RX ORDER — ALBUTEROL SULFATE 90 UG/1
2 INHALANT RESPIRATORY (INHALATION) 4 TIMES DAILY PRN
Qty: 54 G | Refills: 3 | Status: SHIPPED | OUTPATIENT
Start: 2024-11-26

## 2025-01-23 ENCOUNTER — OFFICE VISIT (OUTPATIENT)
Dept: FAMILY MEDICINE CLINIC | Age: 70
End: 2025-01-23

## 2025-01-23 ENCOUNTER — HOSPITAL ENCOUNTER (OUTPATIENT)
Dept: GENERAL RADIOLOGY | Age: 70
Discharge: HOME | End: 2025-01-25
Payer: MEDICARE

## 2025-01-23 ENCOUNTER — HOSPITAL ENCOUNTER (OUTPATIENT)
Age: 70
Discharge: HOME | End: 2025-01-23
Payer: MEDICARE

## 2025-01-23 ENCOUNTER — HOSPITAL ENCOUNTER (OUTPATIENT)
Age: 70
Discharge: HOME | End: 2025-01-25
Payer: MEDICARE

## 2025-01-23 VITALS
WEIGHT: 100.4 LBS | DIASTOLIC BLOOD PRESSURE: 76 MMHG | TEMPERATURE: 98 F | HEIGHT: 65 IN | SYSTOLIC BLOOD PRESSURE: 118 MMHG | HEART RATE: 70 BPM | RESPIRATION RATE: 18 BRPM | BODY MASS INDEX: 16.73 KG/M2 | OXYGEN SATURATION: 95 %

## 2025-01-23 DIAGNOSIS — R63.6 UNDERWEIGHT: ICD-10-CM

## 2025-01-23 DIAGNOSIS — R53.83 OTHER FATIGUE: ICD-10-CM

## 2025-01-23 DIAGNOSIS — R35.0 URINARY FREQUENCY: ICD-10-CM

## 2025-01-23 DIAGNOSIS — Z12.11 COLON CANCER SCREENING: ICD-10-CM

## 2025-01-23 DIAGNOSIS — J47.9 ADULT BRONCHIECTASIS (HCC): ICD-10-CM

## 2025-01-23 DIAGNOSIS — E78.5 DYSLIPIDEMIA: ICD-10-CM

## 2025-01-23 DIAGNOSIS — E55.9 VITAMIN D DEFICIENCY: ICD-10-CM

## 2025-01-23 DIAGNOSIS — M54.9 CHRONIC BACK PAIN, UNSPECIFIED BACK LOCATION, UNSPECIFIED BACK PAIN LATERALITY: ICD-10-CM

## 2025-01-23 DIAGNOSIS — F32.0 MAJOR DEPRESSIVE DISORDER, SINGLE EPISODE, MILD (HCC): ICD-10-CM

## 2025-01-23 DIAGNOSIS — R05.3 CHRONIC COUGH: ICD-10-CM

## 2025-01-23 DIAGNOSIS — D83.9 CVID (COMMON VARIABLE IMMUNODEFICIENCY) (HCC): ICD-10-CM

## 2025-01-23 DIAGNOSIS — Z91.199 MEDICALLY NONCOMPLIANT: ICD-10-CM

## 2025-01-23 DIAGNOSIS — J44.9 CHRONIC OBSTRUCTIVE PULMONARY DISEASE, UNSPECIFIED COPD TYPE (HCC): ICD-10-CM

## 2025-01-23 DIAGNOSIS — G89.29 CHRONIC BACK PAIN, UNSPECIFIED BACK LOCATION, UNSPECIFIED BACK PAIN LATERALITY: ICD-10-CM

## 2025-01-23 DIAGNOSIS — Z00.00 MEDICARE ANNUAL WELLNESS VISIT, SUBSEQUENT: Primary | ICD-10-CM

## 2025-01-23 DIAGNOSIS — Z78.0 POSTMENOPAUSAL: ICD-10-CM

## 2025-01-23 DIAGNOSIS — G47.34 NOCTURNAL HYPOXIA: ICD-10-CM

## 2025-01-23 DIAGNOSIS — Z12.31 BREAST CANCER SCREENING BY MAMMOGRAM: ICD-10-CM

## 2025-01-23 DIAGNOSIS — Z86.19 HISTORY OF MAC INFECTION: ICD-10-CM

## 2025-01-23 DIAGNOSIS — R73.03 PREDIABETES: ICD-10-CM

## 2025-01-23 DIAGNOSIS — R91.1 PULMONARY NODULE: ICD-10-CM

## 2025-01-23 DIAGNOSIS — E46 PROTEIN MALNUTRITION (HCC): ICD-10-CM

## 2025-01-23 LAB
25(OH)D3 SERPL-MCNC: 45.8 NG/ML (ref 30–100)
ALBUMIN SERPL-MCNC: 4.6 G/DL (ref 3.5–5.2)
ALP SERPL-CCNC: 79 U/L (ref 35–104)
ALT SERPL-CCNC: 12 U/L (ref 0–32)
ANION GAP SERPL CALCULATED.3IONS-SCNC: 11 MMOL/L (ref 7–16)
AST SERPL-CCNC: 20 U/L (ref 0–31)
BASOPHILS # BLD: 0.08 K/UL (ref 0–0.2)
BASOPHILS NFR BLD: 1 % (ref 0–2)
BILIRUB SERPL-MCNC: 0.2 MG/DL (ref 0–1.2)
BILIRUBIN, POC: NEGATIVE
BLOOD URINE, POC: NEGATIVE
BUN SERPL-MCNC: 19 MG/DL (ref 6–23)
CALCIUM SERPL-MCNC: 10 MG/DL (ref 8.6–10.2)
CHLORIDE SERPL-SCNC: 99 MMOL/L (ref 98–107)
CHOLEST SERPL-MCNC: 251 MG/DL
CLARITY, POC: CLEAR
CO2 SERPL-SCNC: 31 MMOL/L (ref 22–29)
COLOR, POC: YELLOW
CREAT SERPL-MCNC: 1 MG/DL (ref 0.5–1)
CREATININE URINE: 28.8 MG/DL (ref 29–226)
EOSINOPHIL # BLD: 0.23 K/UL (ref 0.05–0.5)
EOSINOPHILS RELATIVE PERCENT: 3 % (ref 0–6)
ERYTHROCYTE [DISTWIDTH] IN BLOOD BY AUTOMATED COUNT: 12.8 % (ref 11.5–15)
GFR, ESTIMATED: 63 ML/MIN/1.73M2
GLUCOSE SERPL-MCNC: 82 MG/DL (ref 74–99)
GLUCOSE URINE, POC: NEGATIVE MG/DL
HBA1C MFR BLD: 5.6 %
HCT VFR BLD AUTO: 43.3 % (ref 34–48)
HDLC SERPL-MCNC: 88 MG/DL
HGB BLD-MCNC: 14.2 G/DL (ref 11.5–15.5)
IMM GRANULOCYTES # BLD AUTO: <0.03 K/UL (ref 0–0.58)
IMM GRANULOCYTES NFR BLD: 0 % (ref 0–5)
KETONES, POC: NEGATIVE MG/DL
LDLC SERPL CALC-MCNC: 148 MG/DL
LEUKOCYTE EST, POC: NEGATIVE
LYMPHOCYTES NFR BLD: 1.57 K/UL (ref 1.5–4)
LYMPHOCYTES RELATIVE PERCENT: 23 % (ref 20–42)
MCH RBC QN AUTO: 30.2 PG (ref 26–35)
MCHC RBC AUTO-ENTMCNC: 32.8 G/DL (ref 32–34.5)
MCV RBC AUTO: 92.1 FL (ref 80–99.9)
MICROALBUMIN/CREAT 24H UR: <12 MG/L (ref 0–19)
MICROALBUMIN/CREAT UR-RTO: ABNORMAL MCG/MG CREAT (ref 0–30)
MONOCYTES NFR BLD: 0.46 K/UL (ref 0.1–0.95)
MONOCYTES NFR BLD: 7 % (ref 2–12)
NEUTROPHILS NFR BLD: 66 % (ref 43–80)
NEUTS SEG NFR BLD: 4.64 K/UL (ref 1.8–7.3)
NITRITE, POC: NEGATIVE
PH, POC: 5.5
PLATELET # BLD AUTO: 232 K/UL (ref 130–450)
PMV BLD AUTO: 10.3 FL (ref 7–12)
POTASSIUM SERPL-SCNC: 4.7 MMOL/L (ref 3.5–5)
PROT SERPL-MCNC: 8.6 G/DL (ref 6.4–8.3)
PROTEIN, POC: NEGATIVE MG/DL
RBC # BLD AUTO: 4.7 M/UL (ref 3.5–5.5)
SODIUM SERPL-SCNC: 141 MMOL/L (ref 132–146)
SPECIFIC GRAVITY, POC: 1.01
TRIGL SERPL-MCNC: 76 MG/DL
TSH SERPL DL<=0.05 MIU/L-ACNC: 4.27 UIU/ML (ref 0.27–4.2)
URATE SERPL-MCNC: 5 MG/DL (ref 2.4–5.7)
UROBILINOGEN, POC: 0.2 MG/DL
VLDLC SERPL CALC-MCNC: 15 MG/DL
WBC OTHER # BLD: 7 K/UL (ref 4.5–11.5)

## 2025-01-23 PROCEDURE — 80061 LIPID PANEL: CPT

## 2025-01-23 PROCEDURE — 84550 ASSAY OF BLOOD/URIC ACID: CPT

## 2025-01-23 PROCEDURE — 82306 VITAMIN D 25 HYDROXY: CPT

## 2025-01-23 PROCEDURE — 82784 ASSAY IGA/IGD/IGG/IGM EACH: CPT

## 2025-01-23 PROCEDURE — 74019 RADEX ABDOMEN 2 VIEWS: CPT

## 2025-01-23 PROCEDURE — 85025 COMPLETE CBC W/AUTO DIFF WBC: CPT

## 2025-01-23 PROCEDURE — 36415 COLL VENOUS BLD VENIPUNCTURE: CPT

## 2025-01-23 PROCEDURE — 84443 ASSAY THYROID STIM HORMONE: CPT

## 2025-01-23 PROCEDURE — 80053 COMPREHEN METABOLIC PANEL: CPT

## 2025-01-23 RX ORDER — IPRATROPIUM BROMIDE AND ALBUTEROL SULFATE 2.5; .5 MG/3ML; MG/3ML
1 SOLUTION RESPIRATORY (INHALATION) DAILY
Qty: 360 ML | Refills: 0 | Status: SHIPPED | OUTPATIENT
Start: 2025-01-23

## 2025-01-23 SDOH — ECONOMIC STABILITY: FOOD INSECURITY: WITHIN THE PAST 12 MONTHS, THE FOOD YOU BOUGHT JUST DIDN'T LAST AND YOU DIDN'T HAVE MONEY TO GET MORE.: NEVER TRUE

## 2025-01-23 SDOH — ECONOMIC STABILITY: FOOD INSECURITY: WITHIN THE PAST 12 MONTHS, YOU WORRIED THAT YOUR FOOD WOULD RUN OUT BEFORE YOU GOT MONEY TO BUY MORE.: NEVER TRUE

## 2025-01-23 ASSESSMENT — PATIENT HEALTH QUESTIONNAIRE - PHQ9
3. TROUBLE FALLING OR STAYING ASLEEP: SEVERAL DAYS
9. THOUGHTS THAT YOU WOULD BE BETTER OFF DEAD, OR OF HURTING YOURSELF: NOT AT ALL
10. IF YOU CHECKED OFF ANY PROBLEMS, HOW DIFFICULT HAVE THESE PROBLEMS MADE IT FOR YOU TO DO YOUR WORK, TAKE CARE OF THINGS AT HOME, OR GET ALONG WITH OTHER PEOPLE: SOMEWHAT DIFFICULT
2. FEELING DOWN, DEPRESSED OR HOPELESS: SEVERAL DAYS
6. FEELING BAD ABOUT YOURSELF - OR THAT YOU ARE A FAILURE OR HAVE LET YOURSELF OR YOUR FAMILY DOWN: NOT AT ALL
SUM OF ALL RESPONSES TO PHQ QUESTIONS 1-9: 7
5. POOR APPETITE OR OVEREATING: MORE THAN HALF THE DAYS
1. LITTLE INTEREST OR PLEASURE IN DOING THINGS: MORE THAN HALF THE DAYS
7. TROUBLE CONCENTRATING ON THINGS, SUCH AS READING THE NEWSPAPER OR WATCHING TELEVISION: NOT AT ALL
SUM OF ALL RESPONSES TO PHQ9 QUESTIONS 1 & 2: 3
SUM OF ALL RESPONSES TO PHQ QUESTIONS 1-9: 7
8. MOVING OR SPEAKING SO SLOWLY THAT OTHER PEOPLE COULD HAVE NOTICED. OR THE OPPOSITE, BEING SO FIGETY OR RESTLESS THAT YOU HAVE BEEN MOVING AROUND A LOT MORE THAN USUAL: NOT AT ALL
4. FEELING TIRED OR HAVING LITTLE ENERGY: SEVERAL DAYS

## 2025-01-23 ASSESSMENT — LIFESTYLE VARIABLES
HOW MANY STANDARD DRINKS CONTAINING ALCOHOL DO YOU HAVE ON A TYPICAL DAY: PATIENT DOES NOT DRINK
HOW OFTEN DO YOU HAVE A DRINK CONTAINING ALCOHOL: NEVER

## 2025-01-23 NOTE — PROGRESS NOTES
Medicare Annual Wellness Visit    Lisa Pierce is here for Medicare AWV (Pt here for her AWV.) and Other (Pt states she has something where her body makes too much mucous and Pt states she is getting tired of it and does not feel well all the time x the last 10 years.)    Assessment & Plan  1. AWV  Care gaps and survey reviewed.    2. Bronchiectasis.  Her condition remains stable with no significant changes over the past year. She experiences mucus production and occasional hemoptysis, which are consistent with her baseline symptoms. She uses an albuterol rescue inhaler daily but reports significant side effects, including night sweats and bruising. A trial of Spiriva via nebulizer, in conjunction with albuterol, will be considered to potentially enhance therapeutic efficacy. An updated CT scan will be ordered as the previous one from 2023 has . She is advised to schedule a follow-up appointment with Dr. Harris.    3. Hx of MAC  Noncompliant to previous treatment recommendations. Follows with Pulmonology for #2-6. Per patient episodically brings up blood tinged sputum, which is chronic for her. Chronic cough. No change from baseline. Patient agrees to call Pulmonology for updated apt.     4. COPD    5. Nocturnal hypoxia    6. Chronic cough    7. Pulmonary nodule  Updated CT Chest placed with prior one expiring.     8. Protein Malnutrition    9. CVID  Updated immunoglobulins placed today. Patient reports symptoms are stable. Will advise further pending results.     10. Prediabetes    11. Underweight    12. Noncompliant    13. Chronic back pain  She continues to experience lower back pain following a fall at work in 2024. An x-ray of the sacrum and coccyx was performed at [facility], but no abnormalities were detected. A referral for physical therapy will be made to address her lower back pain. If symptoms persist, further imaging such as an MRI may be considered.    Medicare annual wellness

## 2025-01-24 DIAGNOSIS — R79.89 ELEVATED TSH: Primary | ICD-10-CM

## 2025-01-24 DIAGNOSIS — R79.89 ELEVATED TSH: ICD-10-CM

## 2025-01-24 LAB
CULTURE: NORMAL
IGA SERPL-MCNC: 242 MG/DL (ref 70–400)
IGG SERPL-MCNC: 1364 MG/DL (ref 700–1600)
IGM SERPL-MCNC: 602 MG/DL (ref 40–230)
SPECIMEN DESCRIPTION: NORMAL
T4 FREE: 1.1 NG/DL (ref 0.9–1.7)

## 2025-02-03 ENCOUNTER — TELEPHONE (OUTPATIENT)
Dept: FAMILY MEDICINE CLINIC | Age: 70
End: 2025-02-03

## 2025-02-03 DIAGNOSIS — D83.9 COMMON VARIABLE IMMUNODEFICIENCY (HCC): Primary | ICD-10-CM

## 2025-02-03 PROBLEM — R73.03 PREDIABETES: Status: ACTIVE | Noted: 2025-02-03

## 2025-02-03 PROBLEM — R05.3 CHRONIC COUGH: Status: ACTIVE | Noted: 2025-02-03

## 2025-02-03 PROBLEM — G47.34 NOCTURNAL HYPOXIA: Status: ACTIVE | Noted: 2025-02-03

## 2025-02-03 PROBLEM — Z91.199 MEDICALLY NONCOMPLIANT: Status: ACTIVE | Noted: 2025-02-03

## 2025-02-03 PROBLEM — R63.6 UNDERWEIGHT: Status: ACTIVE | Noted: 2025-02-03

## 2025-02-03 PROBLEM — R91.1 PULMONARY NODULE: Status: ACTIVE | Noted: 2025-02-03

## 2025-02-03 PROBLEM — E78.5 DYSLIPIDEMIA: Status: ACTIVE | Noted: 2025-02-03

## 2025-02-03 NOTE — TELEPHONE ENCOUNTER
Urgent referral placed. Please sign 1/23/2025 note for referral to be sent.     Electronically signed by DEL GUTIERREZ MA on 2/3/25 at 11:06 AM EST

## 2025-02-03 NOTE — TELEPHONE ENCOUNTER
----- Message from ROSI PETE MA sent at 2/3/2025  8:38 AM EST -----  Please refer, anywhere but [facility] per patient  ----- Message -----  From: Noelle Maynard MD  Sent: 2/1/2025   9:08 AM EST  To: [facility] Clinical Staff    Ok to place result per patient preference

## 2025-02-04 ENCOUNTER — HOSPITAL ENCOUNTER (OUTPATIENT)
Dept: MAMMOGRAPHY | Age: 70
Discharge: HOME | End: 2025-02-06
Payer: MEDICARE

## 2025-02-04 VITALS — BODY MASS INDEX: 18.4 KG/M2 | HEIGHT: 62 IN | WEIGHT: 100 LBS

## 2025-02-04 DIAGNOSIS — Z78.0 POSTMENOPAUSAL: ICD-10-CM

## 2025-02-04 DIAGNOSIS — Z12.31 BREAST CANCER SCREENING BY MAMMOGRAM: ICD-10-CM

## 2025-02-04 PROCEDURE — 77063 BREAST TOMOSYNTHESIS BI: CPT

## 2025-02-04 PROCEDURE — 77080 DXA BONE DENSITY AXIAL: CPT

## 2025-02-09 LAB — NONINV COLON CA DNA+OCC BLD SCRN STL QL: NEGATIVE

## 2025-02-13 ENCOUNTER — HOSPITAL ENCOUNTER (OUTPATIENT)
Dept: CT IMAGING | Age: 70
Discharge: HOME | End: 2025-02-15
Payer: MEDICARE

## 2025-02-13 DIAGNOSIS — Z86.19 HISTORY OF MAC INFECTION: ICD-10-CM

## 2025-02-13 DIAGNOSIS — R05.3 CHRONIC COUGH: ICD-10-CM

## 2025-02-13 DIAGNOSIS — J47.9 ADULT BRONCHIECTASIS (HCC): ICD-10-CM

## 2025-02-13 DIAGNOSIS — R91.1 PULMONARY NODULE: ICD-10-CM

## 2025-02-13 DIAGNOSIS — J44.9 CHRONIC OBSTRUCTIVE PULMONARY DISEASE, UNSPECIFIED COPD TYPE (HCC): ICD-10-CM

## 2025-02-13 PROCEDURE — 71250 CT THORAX DX C-: CPT

## 2025-02-17 ENCOUNTER — PATIENT MESSAGE (OUTPATIENT)
Dept: FAMILY MEDICINE CLINIC | Age: 70
End: 2025-02-17

## 2025-02-28 ENCOUNTER — TELEPHONE (OUTPATIENT)
Dept: PULMONOLOGY | Age: 70
End: 2025-02-28

## 2025-03-05 ENCOUNTER — SCHEDULED TELEPHONE ENCOUNTER (OUTPATIENT)
Dept: PULMONOLOGY | Age: 70
End: 2025-03-05

## 2025-03-05 DIAGNOSIS — J47.9 ADULT BRONCHIECTASIS (HCC): Primary | ICD-10-CM

## 2025-03-05 DIAGNOSIS — A31.0 PULMONARY MYCOBACTERIUM AVIUM COMPLEX (MAC) INFECTION (HCC): ICD-10-CM

## 2025-03-05 DIAGNOSIS — J44.9 CHRONIC OBSTRUCTIVE PULMONARY DISEASE, UNSPECIFIED COPD TYPE (HCC): ICD-10-CM

## 2025-03-05 NOTE — PROGRESS NOTES
Mailed a letter to patient informing her that her PFT is scheduled for 3-20-25 at 10:30 am at the [facility]. She must arrive by 10:00 am. She is to have no caffeine for 24 hours prior to test and no resp meds for at least 4 hours prior to test/  
restriction.    There is no significant bronchodilator response. The TLC is normal.  The RV and RV/TLC are elevated indicating air trapping. The diffusing capacity is normal.  From [id number] was followed by pulm at [place] then again was seen in [place]. Sputum cx Sept 2018 showed pseudomonas and she was treated with Levaquin. CT of the chest at that time showed persistent fibrosis and bronchiectasis of the right middle lobe. Multiple cavitary nodules.   She was treated inpatient in [place] in May of 2019 due to pseudomonas again.  BAL 5/29/2019 was positive for KAMALA. She was again treated with azithromycin, rifampin, ethambutol.   Spirometry 6/2019: Spirometry shows no obstruction. The reduced FVC suggests restriction.  Recommend lung volumes if clinically indicated. The diffusing capacity corrected for hemoglobin is normal.  She was admitted to the hospital locally 12/28/2019 due to increased shortness of breath she was initially seen by Dr. Jason Nunn and Dr. Gallardo. She tested positive for both RSV .She was discharged on 1/1/2020  Repeat Cx on 2/17 again showed MAC.  April 20, 2020 she was then supposed to be started on Amikacin IV 3 times a week, rifampin, ethambutol, and clarithromycin. She did not end up taking the amikacin and per notes stopped the po antibiotics due to feeling ill. She did start amikacin inhaled. Plan was then changed to amikacin inhaled with ethambutol and rifampin three times a week.   Presented to the ER on 5/20 due to intractable nausea, she was found to have MATTEO with hyperkalemia with CR 12.8 and . During this admission she did require HD. She was discharged on 5/31/20  Virtual visit with Dr. Delgado 6-2019  Admitted 1/5/2021 due to Septic Shock, She was treated for community acquired pna and discharged on 1/10/21.   She has not been seen by pulmonary since then.   She did test positive foe COVID-19 on 4/20/2022. She was treated with decadron, doxycycline, vitamin D.

## 2025-03-06 ENCOUNTER — OFFICE VISIT (OUTPATIENT)
Dept: FAMILY MEDICINE CLINIC | Age: 70
End: 2025-03-06
Payer: MEDICARE

## 2025-03-06 VITALS
HEART RATE: 69 BPM | WEIGHT: 101.2 LBS | SYSTOLIC BLOOD PRESSURE: 108 MMHG | BODY MASS INDEX: 18.62 KG/M2 | DIASTOLIC BLOOD PRESSURE: 62 MMHG | RESPIRATION RATE: 18 BRPM | OXYGEN SATURATION: 95 % | HEIGHT: 62 IN | TEMPERATURE: 97.9 F

## 2025-03-06 DIAGNOSIS — N64.89 OTHER SPECIFIED DISORDERS OF BREAST: ICD-10-CM

## 2025-03-06 DIAGNOSIS — E78.2 MIXED HYPERLIPIDEMIA: ICD-10-CM

## 2025-03-06 DIAGNOSIS — D83.9 CVID (COMMON VARIABLE IMMUNODEFICIENCY) (HCC): ICD-10-CM

## 2025-03-06 DIAGNOSIS — K59.00 CONSTIPATION, UNSPECIFIED CONSTIPATION TYPE: ICD-10-CM

## 2025-03-06 DIAGNOSIS — R92.30 DENSE BREAST: ICD-10-CM

## 2025-03-06 DIAGNOSIS — M85.80 OSTEOPENIA, UNSPECIFIED LOCATION: ICD-10-CM

## 2025-03-06 DIAGNOSIS — J47.9 ADULT BRONCHIECTASIS (HCC): Primary | ICD-10-CM

## 2025-03-06 DIAGNOSIS — G47.34 NOCTURNAL HYPOXIA: ICD-10-CM

## 2025-03-06 PROCEDURE — 99215 OFFICE O/P EST HI 40 MIN: CPT | Performed by: FAMILY MEDICINE

## 2025-03-06 PROCEDURE — 1123F ACP DISCUSS/DSCN MKR DOCD: CPT | Performed by: FAMILY MEDICINE

## 2025-03-06 PROCEDURE — G2211 COMPLEX E/M VISIT ADD ON: HCPCS | Performed by: FAMILY MEDICINE

## 2025-03-06 RX ORDER — OMEGA-3S/DHA/EPA/FISH OIL/D3 300MG-1000
400 CAPSULE ORAL DAILY
COMMUNITY

## 2025-03-06 RX ORDER — ASCORBIC ACID 500 MG
1000 TABLET ORAL DAILY
COMMUNITY

## 2025-03-06 NOTE — PROGRESS NOTES
[facility]  Family Medicine Outpatient    Patient Care Team:  Noelle Maynard MD as PCP - General (Family Medicine)  Noelle Maynard MD as PCP - Empaneled Provider      SUBJECTIVE:  CC: had concerns including Follow-up (Pt here for a 6 week check. Today she feels well, but states she felt terrible yesterday. It's a day to day thing.), Results (Had mammogram, dexa scan, CT Scan of chest in Feb 2025. Just wanted Dr to know.//States she spoke to the Pulmonary Dr yesterday on the phone.), and Other (Pt states she needs a referral to an Immunologist.).  HPI:  Lisa Pierce is a female 69 y.o.   History of Present Illness    The 10-year ASCVD risk score (Den GARCIA, et al., 2019) is: 4.7%    Values used to calculate the score:      Age: 69 years      Sex: Female      Is Non- : No      Diabetic: No      Tobacco smoker: No      Systolic Blood Pressure: 108 mmHg      Is BP treated: No      HDL Cholesterol: 88 mg/dL      Total Cholesterol: 251 mg/dL    She had a consultation with Dr. Harris yesterday, during which she was informed that her bronchiectasis remains unchanged from the previous CT scan. A follow-up CT scan is scheduled for 08/2025. She has not resumed her Incruse medication as it was deemed ineffective. She continues to use DuoNeb, although it does not facilitate expectoration. She also uses albuterol as a rescue inhaler, despite experiencing adverse effects such as nighttime sickness. She has been advised to undergo a lung function test, which will guide potential changes in her treatment plan. This test is scheduled for 03/20/2025 at 10:30 AM.    She has been incorporating more fiber into her diet. She reports daily bowel movements, with occasional straining. She has previously used Colace but discontinued it due to explosive diarrhea.    She consumes a significant amount of dairy products, including cheese, cottage cheese, yogurt, and milk, despite their potential to

## 2025-03-06 NOTE — PATIENT INSTRUCTIONS
Strength training includes the use of free weights, resistance bands or your own body weight to strengthen muscles, tendons and bones. Strength training is especially helpful to build back muscles that are important for posture. It also can help support bone density.  You should tailor your strength training to your ability and level of comfort, especially if you have pain. It is worthwhile to talk to a physical therapist or  who has experience working with people with osteoporosis. They can help you develop a strength-training routine. They also can help you learn to use proper form and technique to prevent injury and get the most from each workout.

## 2025-03-10 ENCOUNTER — TELEPHONE (OUTPATIENT)
Dept: FAMILY MEDICINE CLINIC | Age: 70
End: 2025-03-10

## 2025-03-10 NOTE — TELEPHONE ENCOUNTER
Please sign 3/6/2025 note for referral to be sent.     Electronically signed by DEL GUTIERREZ MA on 3/10/25 at 3:12 PM EDT

## 2025-03-17 ASSESSMENT — ENCOUNTER SYMPTOMS
NAUSEA: 0
DIARRHEA: 0
CONSTIPATION: 0
COUGH: 1
SHORTNESS OF BREATH: 1
WHEEZING: 0
ABDOMINAL PAIN: 0
VOMITING: 0

## 2025-05-24 DIAGNOSIS — J47.9 ADULT BRONCHIECTASIS (HCC): ICD-10-CM

## 2025-05-24 DIAGNOSIS — R05.3 CHRONIC COUGH: ICD-10-CM

## 2025-05-24 DIAGNOSIS — J44.9 CHRONIC OBSTRUCTIVE PULMONARY DISEASE, UNSPECIFIED COPD TYPE (HCC): ICD-10-CM

## 2025-05-24 DIAGNOSIS — Z86.19 HISTORY OF MAC INFECTION: ICD-10-CM

## 2025-05-28 RX ORDER — IPRATROPIUM BROMIDE AND ALBUTEROL SULFATE 2.5; .5 MG/3ML; MG/3ML
SOLUTION RESPIRATORY (INHALATION)
Qty: 360 ML | Refills: 0 | Status: SHIPPED | OUTPATIENT
Start: 2025-05-28

## 2025-05-28 NOTE — TELEPHONE ENCOUNTER
Last Appointment:  3/6/2025  Future Appointments   Date Time Provider Department Center   9/2/2025  1:00 PM Noelle Maynard MD [facility]   1/27/2026 12:45 PM Noelle Maynard MD [facility]

## (undated) DEVICE — Device: Brand: MEDEX

## (undated) DEVICE — SYRINGE MED 50ML LUERSLIP TIP

## (undated) DEVICE — TRAP,MUCUS SPECIMEN, 80CC: Brand: MEDLINE

## (undated) DEVICE — AIRWAY PHARYNGEAL AD 10 CM INTUB

## (undated) DEVICE — SYRINGE MED 50ML LUERLOCK TIP

## (undated) DEVICE — SET EXTN IV L30IN TBNG DIA0.1IN PRIMING 4ML MACBOR FEM ADPT

## (undated) DEVICE — SOLUTION IV IRRIG 500ML 0.9% SODIUM CHL 2F7123

## (undated) DEVICE — MASK RESP UNIV N95 4 PANEL HD STRP INDIVIDUALLY WRP LF

## (undated) DEVICE — SURGICAL PROCEDURE PACK BRONCH

## (undated) DEVICE — SINGLE USE BIOPSY VALVE MAJ-210: Brand: SINGLE USE BIOPSY VALVE (STERILE)

## (undated) DEVICE — SOLUTION IRRIG 500ML 0.9% SOD CHL USP POUR PLAS BTL

## (undated) DEVICE — DOUBLE  SWIVEL ELBOW 15M - DOUBLE FLIP TOP CAP WITH SEAL - 22M/15F: Brand: DOUBLE  SWIVEL ELBOW 15M - DOUBLE FLIP TOP CAP WITH SEAL - 22M/15F

## (undated) DEVICE — SINGLE USE SUCTION VALVE MAJ-209: Brand: SINGLE USE SUCTION VALVE (STERILE)

## (undated) DEVICE — BRONCHOSCOPY PACK: Brand: MEDLINE INDUSTRIES, INC.

## (undated) DEVICE — BITEBLOCK 54FR W/ DENT RIM BLOX